# Patient Record
Sex: FEMALE | Race: BLACK OR AFRICAN AMERICAN | Employment: OTHER | URBAN - METROPOLITAN AREA
[De-identification: names, ages, dates, MRNs, and addresses within clinical notes are randomized per-mention and may not be internally consistent; named-entity substitution may affect disease eponyms.]

---

## 2017-01-01 ENCOUNTER — TELEPHONE (OUTPATIENT)
Dept: FAMILY MEDICINE CLINIC | Age: 82
End: 2017-01-01

## 2017-01-01 ENCOUNTER — OFFICE VISIT (OUTPATIENT)
Dept: FAMILY MEDICINE CLINIC | Age: 82
End: 2017-01-01

## 2017-01-01 VITALS
WEIGHT: 89.2 LBS | HEART RATE: 60 BPM | OXYGEN SATURATION: 97 % | BODY MASS INDEX: 18.72 KG/M2 | TEMPERATURE: 98.3 F | RESPIRATION RATE: 18 BRPM | SYSTOLIC BLOOD PRESSURE: 138 MMHG | DIASTOLIC BLOOD PRESSURE: 75 MMHG | HEIGHT: 58 IN

## 2017-01-01 DIAGNOSIS — J84.9 INTERSTITIAL LUNG DISEASE (HCC): ICD-10-CM

## 2017-01-01 DIAGNOSIS — R21 RASH: ICD-10-CM

## 2017-01-01 DIAGNOSIS — D63.8 ANEMIA, CHRONIC DISEASE: ICD-10-CM

## 2017-01-01 DIAGNOSIS — R05.9 COUGH: Primary | ICD-10-CM

## 2017-01-01 RX ORDER — METHYLPREDNISOLONE 4 MG/1
TABLET ORAL
Qty: 1 DOSE PACK | Refills: 0 | Status: SHIPPED | OUTPATIENT
Start: 2017-01-01 | End: 2018-01-01 | Stop reason: ALTCHOICE

## 2017-01-01 RX ORDER — ALBUTEROL SULFATE 90 UG/1
1 AEROSOL, METERED RESPIRATORY (INHALATION)
Qty: 1 INHALER | Refills: 3 | Status: SHIPPED | OUTPATIENT
Start: 2017-01-01 | End: 2018-01-01 | Stop reason: SDUPTHER

## 2017-01-01 RX ORDER — LANOLIN ALCOHOL/MO/W.PET/CERES
CREAM (GRAM) TOPICAL
Qty: 30 TAB | Refills: 0 | Status: SHIPPED | OUTPATIENT
Start: 2017-01-01

## 2017-01-27 ENCOUNTER — TELEPHONE (OUTPATIENT)
Dept: CARDIOLOGY CLINIC | Age: 82
End: 2017-01-27

## 2017-02-15 ENCOUNTER — OFFICE VISIT (OUTPATIENT)
Dept: FAMILY MEDICINE CLINIC | Age: 82
End: 2017-02-15

## 2017-02-15 VITALS
HEART RATE: 73 BPM | DIASTOLIC BLOOD PRESSURE: 95 MMHG | BODY MASS INDEX: 18.34 KG/M2 | HEIGHT: 60 IN | RESPIRATION RATE: 12 BRPM | SYSTOLIC BLOOD PRESSURE: 215 MMHG | TEMPERATURE: 98.7 F | OXYGEN SATURATION: 97 % | WEIGHT: 93.4 LBS

## 2017-02-15 DIAGNOSIS — Z00.00 MEDICARE ANNUAL WELLNESS VISIT, SUBSEQUENT: ICD-10-CM

## 2017-02-15 DIAGNOSIS — Z71.89 ACP (ADVANCE CARE PLANNING): ICD-10-CM

## 2017-02-15 DIAGNOSIS — I10 ESSENTIAL HYPERTENSION: ICD-10-CM

## 2017-02-15 DIAGNOSIS — J84.9 INTERSTITIAL LUNG DISEASE (HCC): ICD-10-CM

## 2017-02-15 DIAGNOSIS — D63.8 ANEMIA, CHRONIC DISEASE: ICD-10-CM

## 2017-02-15 DIAGNOSIS — G30.9 DEMENTIA IN ALZHEIMER'S DISEASE (HCC): Primary | ICD-10-CM

## 2017-02-15 DIAGNOSIS — Z23 ENCOUNTER FOR IMMUNIZATION: ICD-10-CM

## 2017-02-15 DIAGNOSIS — M05.79 RHEUMATOID ARTHRITIS INVOLVING MULTIPLE SITES WITH POSITIVE RHEUMATOID FACTOR (HCC): ICD-10-CM

## 2017-02-15 DIAGNOSIS — F02.80 DEMENTIA IN ALZHEIMER'S DISEASE (HCC): Primary | ICD-10-CM

## 2017-02-15 RX ORDER — AMLODIPINE BESYLATE 10 MG/1
10 TABLET ORAL DAILY
Qty: 30 TAB | Refills: 5 | Status: SHIPPED | OUTPATIENT
Start: 2017-02-15 | End: 2017-02-26

## 2017-02-15 RX ORDER — ALBUTEROL SULFATE 90 UG/1
1 AEROSOL, METERED RESPIRATORY (INHALATION)
Qty: 1 INHALER | Refills: 3 | Status: SHIPPED | OUTPATIENT
Start: 2017-02-15 | End: 2017-01-01 | Stop reason: SDUPTHER

## 2017-02-15 RX ORDER — HYDRALAZINE HYDROCHLORIDE 25 MG/1
25 TABLET, FILM COATED ORAL 3 TIMES DAILY
COMMUNITY
End: 2017-02-22 | Stop reason: ALTCHOICE

## 2017-02-15 RX ORDER — FERROUS SULFATE 325(65) MG
325 TABLET, DELAYED RELEASE (ENTERIC COATED) ORAL
Qty: 30 TAB | Refills: 5 | Status: SHIPPED | OUTPATIENT
Start: 2017-02-15 | End: 2017-01-01 | Stop reason: SDUPTHER

## 2017-02-15 RX ORDER — DONEPEZIL HYDROCHLORIDE 10 MG/1
TABLET, FILM COATED ORAL
Qty: 30 TAB | Refills: 5 | Status: SHIPPED | OUTPATIENT
Start: 2017-02-15 | End: 2018-01-01 | Stop reason: SDDI

## 2017-02-15 RX ORDER — HYDRALAZINE HYDROCHLORIDE 25 MG/1
25 TABLET, FILM COATED ORAL 3 TIMES DAILY
Qty: 90 TAB | Refills: 5 | Status: CANCELLED | OUTPATIENT
Start: 2017-02-15

## 2017-02-15 NOTE — PATIENT INSTRUCTIONS
Advance Directives: Care Instructions  Your Care Instructions  An advance directive is a legal way to state your wishes at the end of your life. It tells your family and your doctor what to do if you can no longer say what you want. There are two main types of advance directives. You can change them any time that your wishes change. · A living will tells your family and your doctor your wishes about life support and other treatment. · A medical power of  lets you name a person to make treatment decisions for you when you can't speak for yourself. This person is called a health care agent. If you do not have an advance directive, decisions about your medical care may be made by a doctor or a  who doesn't know you. It may help to think of an advance directive as a gift to the people who care for you. If you have one, they won't have to make tough decisions by themselves. Follow-up care is a key part of your treatment and safety. Be sure to make and go to all appointments, and call your doctor if you are having problems. It's also a good idea to know your test results and keep a list of the medicines you take. How can you care for yourself at home? · Discuss your wishes with your loved ones and your doctor. This way, there are no surprises. · Many states have a unique form. Or you might use a universal form that has been approved by many states. This kind of form can sometimes be completed and stored online. Your electronic copy will then be available wherever you have a connection to the Internet. In most cases, doctors will respect your wishes even if you have a form from a different state. · You don't need a  to do an advance directive. But you may want to get legal advice. · Think about these questions when you prepare an advance directive:  ¨ Who do you want to make decisions about your medical care if you are not able to?  Many people choose a family member, close friend, or doctor. ¨ Do you know enough about life support methods that might be used? If not, talk to your doctor so you understand. ¨ What are you most afraid of that might happen? You might be afraid of having pain, losing your independence, or being kept alive by machines. ¨ Where would you prefer to die? Choices include your home, a hospital, or a nursing home. ¨ Would you like to have information about hospice care to support you and your family? ¨ Do you want to donate organs when you die? ¨ Do you want certain Mormonism practices performed before you die? If so, put your wishes in the advance directive. · Read your advance directive every year, and make changes as needed. When should you call for help? Be sure to contact your doctor if you have any questions. Where can you learn more? Go to http://ivon-deepali.info/. Enter R264 in the search box to learn more about \"Advance Directives: Care Instructions. \"  Current as of: February 24, 2016  Content Version: 11.1  © 5718-6961 [employer]. Care instructions adapted under license by [employer] (which disclaims liability or warranty for this information). If you have questions about a medical condition or this instruction, always ask your healthcare professional. [address] any warranty or liability for your use of this information.

## 2017-02-15 NOTE — PROGRESS NOTES
1. Have you been to the ER, urgent care clinic since your last visit? Hospitalized since your last visit? No    2. Have you seen or consulted any other health care providers outside of the [address] [place] [address] since your last visit? Include any pap smears or colon screening.  No     Chief Complaint   Patient presents with    Medication Refill

## 2017-02-15 NOTE — MR AVS SNAPSHOT
Visit Information Date & Time Provider Department Dept. Phone Encounter #  
 2/15/2017 10:00 AM Carissa Ash NP [facility] [phone] [id number] Follow-up Instructions Return in about 4 weeks (around 3/15/2017) for anemia and blood pressure. Upcoming Health Maintenance Date Due DTaP/Tdap/Td series (1 - Tdap) 4/5/1953 Pneumococcal 65+ Low/Medium Risk (2 of 2 - PCV13) 2/22/2014 EYE EXAM RETINAL OR DILATED Q1 9/11/2015 MICROALBUMIN Q1 4/20/2016 INFLUENZA AGE 9 TO ADULT 8/1/2016 FOOT EXAM Q1 8/28/2016 GLAUCOMA SCREENING Q2Y 9/11/2016 MEDICARE YEARLY EXAM 11/4/2016 HEMOGLOBIN A1C Q6M 6/14/2017 LIPID PANEL Q1 12/15/2017 Allergies as of 2/15/2017  Review Complete On: 2/15/2017 By: Carissa Ash NP Severity Noted Reaction Type Reactions Codeine  05/27/2010    Nausea and Vomiting Current Immunizations  Reviewed on 9/4/2015 Name Date Influenza High Dose Vaccine PF  Incomplete, 10/23/2014 Influenza Vaccine 11/12/2013 Influenza Vaccine Split 11/16/2012  1:22 PM, 11/15/2012  6:17 PM  
 Influenza Vaccine Whole 10/27/2011 Pneumococcal Polysaccharide (PPSV-23) 2/22/2013 Not reviewed this visit You Were Diagnosed With   
  
 Codes Comments Dementia in Alzheimer's disease    -  Primary ICD-10-CM: G30.9, F02.80 ICD-9-CM: 331.0, 294.10 Essential hypertension     ICD-10-CM: I10 
ICD-9-CM: 401.9 Anemia, chronic disease     ICD-10-CM: D63.8 ICD-9-CM: 285.29 Interstitial lung disease ([facility] [address]     ICD-10-CM: J84.9 ICD-9-CM: 590 Rheumatoid arthritis involving multiple sites with positive rheumatoid factor (HCC)     ICD-10-CM: M05.79 ICD-9-CM: 714.0 Encounter for immunization     ICD-10-CM: D67 ICD-9-CM: V03.89 Medicare annual wellness visit, subsequent     ICD-10-CM: Z00.00 ICD-9-CM: V70.0 ACP (advance care planning)     ICD-10-CM: Z71.89 ICD-9-CM: V65.49 Vitals BP Pulse Temp Resp Height(growth percentile) Weight(growth percentile) (!) 215/95 (BP 1 Location: Right leg, BP Patient Position: Sitting) 73 98.7 °F (37.1 °C) (Oral) 12 5' (1.524 m) 93 lb 6.4 oz (42.4 kg) SpO2 BMI OB Status Smoking Status 97% 18.24 kg/m2 Postmenopausal Never Smoker Vitals History BMI and BSA Data Body Mass Index Body Surface Area  
 18.24 kg/m 2 1.34 m 2 Preferred Pharmacy Pharmacy Name Phone [address] AT [address] [place] [address]. [phone] Your Updated Medication List  
  
   
This list is accurate as of: 2/15/17 10:29 AM.  Always use your most recent med list.  
  
  
  
  
 acetaminophen 500 mg tablet Commonly known as:  TYLENOL Take 1,000 mg by mouth daily as needed. albuterol 90 mcg/actuation inhaler Commonly known as:  PROVENTIL HFA, VENTOLIN HFA, PROAIR HFA Take 1 Puff by inhalation every four (4) hours as needed for Wheezing. amLODIPine 10 mg tablet Commonly known as:  Stephani [place] Take 1 Tab by mouth daily. For blood pressure  
  
 aspirin delayed-release 81 mg tablet Take 1 tablet by mouth daily. donepezil 10 mg tablet Commonly known as:  ARICEPT  
TAKE 1 TABLET BY MOUTH NIGHTLY. ferrous sulfate 325 mg (65 mg iron) EC tablet Commonly known as:  IRON Take 1 Tab by mouth Daily (before breakfast). hydrALAZINE 25 mg tablet Commonly known as:  APRESOLINE Take 25 mg by mouth three (3) times daily. multivitamin tablet Commonly known as:  ONE A DAY Take 1 Tab by mouth daily. pneumococcal 13 nelly conj dip 0.5 mL Syrg injection Commonly known as:  PREVNAR-13  
0.5 mL by IntraMUSCular route once for 1 dose. VITAMIN D3 1,000 unit tablet Generic drug:  cholecalciferol Take 2,000 Units by mouth daily. Prescriptions Printed Refills pneumococcal 13 nelly conj dip (PREVNAR-13) 0.5 mL syrg injection 0 Si.5 mL by IntraMUSCular route once for 1 dose. Class: Print Route: IntraMUSCular Prescriptions Sent to Pharmacy Refills  
 albuterol (PROVENTIL HFA, VENTOLIN HFA, PROAIR HFA) 90 mcg/actuation inhaler 3 Sig: Take 1 Puff by inhalation every four (4) hours as needed for Wheezing. Class: Normal  
 Pharmacy: [facility] [address] AT [address] [place] [address]. Ph #: [phone] Route: Inhalation  
 donepezil (ARICEPT) 10 mg tablet 5 Sig: TAKE 1 TABLET BY MOUTH NIGHTLY. Class: Normal  
 Pharmacy: [facility] [address] AT [address] [place] [address]. Ph #: [phone]  
 amLODIPine (NORVASC) 10 mg tablet 5 Sig: Take 1 Tab by mouth daily. For blood pressure Class: Normal  
 Pharmacy: [facility] [address] AT [address] [place] [address]. Ph #: [phone] Route: Oral  
 ferrous sulfate (IRON) 325 mg (65 mg iron) EC tablet 5 Sig: Take 1 Tab by mouth Daily (before breakfast). Class: Normal  
 Pharmacy: [facility] [address] AT [address] [place] [address]. Ph #: [phone] Route: Oral  
  
We Performed the Following INFLUENZA VIRUS VACCINE, HIGH DOSE SEASONAL, PRESERVATIVE FREE [61447 CPT(R)] OCCULT BLOOD, IMMUNOASSAY (FIT) [id number] CPT(R)] Follow-up Instructions Return in about 4 weeks (around 3/15/2017) for anemia and blood pressure. Patient Instructions Advance Directives: Care Instructions Your Care Instructions An advance directive is a legal way to state your wishes at the end of your life. It tells your family and your doctor what to do if you can no longer say what you want. There are two main types of advance directives. You can change them any time that your wishes change. · A living will tells your family and your doctor your wishes about life support and other treatment. · A medical power of  lets you name a person to make treatment decisions for you when you can't speak for yourself. This person is called a health care agent. If you do not have an advance directive, decisions about your medical care may be made by a doctor or a  who doesn't know you. It may help to think of an advance directive as a gift to the people who care for you. If you have one, they won't have to make tough decisions by themselves. Follow-up care is a key part of your treatment and safety. Be sure to make and go to all appointments, and call your doctor if you are having problems. It's also a good idea to know your test results and keep a list of the medicines you take. How can you care for yourself at home? · Discuss your wishes with your loved ones and your doctor. This way, there are no surprises. · Many states have a unique form. Or you might use a universal form that has been approved by many states. This kind of form can sometimes be completed and stored online. Your electronic copy will then be available wherever you have a connection to the Internet. In most cases, doctors will respect your wishes even if you have a form from a different state. · You don't need a  to do an advance directive. But you may want to get legal advice. · Think about these questions when you prepare an advance directive: ¨ Who do you want to make decisions about your medical care if you are not able to? Many people choose a family member, close friend, or doctor. ¨ Do you know enough about life support methods that might be used? If not, talk to your doctor so you understand. ¨ What are you most afraid of that might happen? You might be afraid of having pain, losing your independence, or being kept alive by machines. ¨ Where would you prefer to die?  Choices include your home, a hospital, or a nursing home. ¨ Would you like to have information about hospice care to support you and your family? ¨ Do you want to donate organs when you die? ¨ Do you want certain Anabaptism practices performed before you die? If so, put your wishes in the advance directive. · Read your advance directive every year, and make changes as needed. When should you call for help? Be sure to contact your doctor if you have any questions. Where can you learn more? Go to http://ivon-deepali.info/. Enter R264 in the search box to learn more about \"Advance Directives: Care Instructions. \" Current as of: February 24, 2016 Content Version: 11.1 © 6888-4669 [employer]. Care instructions adapted under license by [employer] (which disclaims liability or warranty for this information). If you have questions about a medical condition or this instruction, always ask your healthcare professional. [place] [address] any warranty or liability for your use of this information. Introducing [facility]! [facility] introduces [employer]Hound patient portal. Now you can access parts of your medical record, email your doctor's office, and request medication refills online. 1. In your internet browser, go to [email or web address] 2. Click on the First Time User? Click Here link in the Sign In box. You will see the New Member Sign Up page. 3. Enter your PointsHound Access Code exactly as it appears below. You will not need to use this code after youve completed the sign-up process. If you do not sign up before the expiration date, you must request a new code. · PointsHound Access Code: [email or web address] Expires: 3/14/2017 12:09 PM 
 
4. Enter the last four digits of your Social Security Number (xxxx) and Date of Birth (mm/dd/yyyy) as indicated and click Submit. You will be taken to the next sign-up page. 5. Create a [employer] ID. This will be your [employer] login ID and cannot be changed, so think of one that is secure and easy to remember. 6. Create a [employer] password. You can change your password at any time. 7. Enter your Password Reset Question and Answer. This can be used at a later time if you forget your password. 8. Enter your e-mail address. You will receive e-mail notification when new information is available in [address] 9. Click Sign Up. You can now view and download portions of your medical record. 10. Click the Download Summary menu link to download a portable copy of your medical information. If you have questions, please visit the Frequently Asked Questions section of the [employer] website. Remember, [employer] is NOT to be used for urgent needs. For medical emergencies, dial 911. Now available from your iPhone and Android! Please provide this summary of care documentation to your next provider. Your primary care clinician is listed as Vahid Oswald. If you have any questions after today's visit, please call [phone].

## 2017-02-15 NOTE — PROGRESS NOTES
HISTORY OF PRESENT ILLNESS  Colin Whalen is a 80 y.o. female. HPI   Cardiovascular Review:  The patient has diabetes, hypertension, hyperlipidemia and coronary artery disease. Diet and Lifestyle: generally follows a low fat low cholesterol diet, generally follows a low sodium diet, sedentary  Home BP Monitoring: is not well controlled at home, ranging 170/90's. Patient's son stopped her blood pressure medications as he though sh no longer needed them. Was seen in ED admitted in 12/2016 for N/V. Was placed on Hydralazine TID. Pertinent ROS: taking medications as instructed, no medication side effects noted, no TIA's, no chest pain on exertion, no dyspnea on exertion, no swelling of ankles. Dementia  Patient is seen for follow up of dementia. She is accompanied by her son. Primary caregiver is son. Patient recently moved in with her son and is in an adult day support program. Overall progress since last visit: Cognitive symptoms: not changed. Behavioral symptoms: not changed    The family and the patient identify problems with changes in short and long term memory. Family and patient are concerned about  inability to maintain adequate nutrition. Medication administration: family sets up medications    Functional Assessment:   Activities of Daily Living (ADLs):    She is independent in the following: feeding and toileting  Requires assistance with the following: bathing and hygiene, grooming and dressing    Anemia  Patient presents for evaluation of anemia. It has been present for several years with worsening progression. Associated signs & symptoms: fatigue. Recent iron level was low. B12 and folate levels are normal. No recent colonoscopy. Osteoarthritis and Chronic Pain:  Patient has rheumatoid arthritis, primarily affecting the knees and hands. Symptoms onset: problem is longstanding. Rheumatological ROS: using Tylenol PRN. Previously seen by rheumatology but no follow up scheduled. Current Outpatient Prescriptions   Medication Sig Dispense Refill    hydrALAZINE (APRESOLINE) 25 mg tablet Take 25 mg by mouth three (3) times daily.  albuterol (PROVENTIL HFA, VENTOLIN HFA, PROAIR HFA) 90 mcg/actuation inhaler Take 1 Puff by inhalation every four (4) hours as needed for Wheezing. 1 Inhaler 3    donepezil (ARICEPT) 10 mg tablet TAKE 1 TABLET BY MOUTH NIGHTLY. 30 Tab 5    pneumococcal 13 nelly conj dip (PREVNAR-13) 0.5 mL syrg injection 0.5 mL by IntraMUSCular route once for 1 dose. 0.5 mL 0    amLODIPine (NORVASC) 10 mg tablet Take 1 Tab by mouth daily. For blood pressure 30 Tab 5    ferrous sulfate (IRON) 325 mg (65 mg iron) EC tablet Take 1 Tab by mouth Daily (before breakfast). 30 Tab 5    multivitamin (ONE A DAY) tablet Take 1 Tab by mouth daily.  aspirin delayed-release 81 mg tablet Take 1 tablet by mouth daily. 30 tablet 11    cholecalciferol (VITAMIN D3) 1,000 unit tablet Take 2,000 Units by mouth daily.  acetaminophen (TYLENOL) 500 mg tablet Take 1,000 mg by mouth daily as needed.        Past Medical History   Diagnosis Date    CAD (coronary artery disease)      CAD    Dementia in Alzheimer's disease     Depression     DM type 2 causing vascular disease (HCC)     GERD (gastroesophageal reflux disease)     Hx of tuberculosis      as child    Hypercholesterolemia     Hypertension     Osteoporosis, post-menopausal     Rheumatoid arthritis(714.0)      Past Surgical History   Procedure Laterality Date    Hx mohs procedure       right    Hx cataract removal       left with lens implant    Hx coronary stent placement      Hx coronary artery bypass graft      Hx knee replacement Left        Lab Results   Component Value Date/Time    WBC 4.2 12/16/2016 04:29 AM    WBC 6.9 05/09/2012 11:50 AM    Hemoglobin (POC) 12.2 05/27/2010 04:07 PM    HGB 8.0 12/16/2016 04:29 AM    Hematocrit (POC) 36 05/27/2010 04:07 PM    HCT 26.1 12/16/2016 04:29 AM    PLATELET 129 12/16/2016 04:29 AM    MCV 90.0 12/16/2016 04:29 AM       Review of Systems   Constitutional: Negative for malaise/fatigue and weight loss. Respiratory: Positive for cough (at night). Cardiovascular: Negative for palpitations and leg swelling. Gastrointestinal: Negative for heartburn. Musculoskeletal: Positive for joint pain. Negative for back pain and myalgias. Neurological: Negative for dizziness and weakness. Psychiatric/Behavioral: Positive for memory loss. Negative for depression. Physical Exam   Constitutional: She appears well-developed and well-nourished. Neck: Normal range of motion. Neck supple. No JVD present. Carotid bruit is not present. No thyromegaly present. Cardiovascular: Normal rate, regular rhythm and intact distal pulses. Exam reveals no gallop and no friction rub. No murmur heard. Pulmonary/Chest: Effort normal and breath sounds normal. No respiratory distress. Musculoskeletal: She exhibits no edema. Lymphadenopathy:     She has no cervical adenopathy. Neurological: She is alert. She is disoriented (to time ). Gait (in wheel chair) abnormal.   Psychiatric: She has a normal mood and affect. Her behavior is normal.   Nursing note and vitals reviewed. ASSESSMENT and PLAN  Corey Marking was seen today for medication refill. Diagnoses and all orders for this visit:    Dementia in Alzheimer's disease  Stable, no changes to current therapy  -     Refill donepezil (ARICEPT) 10 mg tablet; TAKE 1 TABLET BY MOUTH NIGHTLY. Essential hypertension  Not to goal. Stop Hydralazine. Resume CCB. Continue home monitoring and call for reading >150/90  -     amLODIPine (NORVASC) 10 mg tablet; Take 1 Tab by mouth daily. For blood pressure    Anemia, chronic disease  Progressive decline in H/H. Begin iron supplement daily. Check stool for blood. GI or hematology referrals as needed.   -     OCCULT BLOOD, IMMUNOASSAY (FIT)  -     ferrous sulfate (IRON) 325 mg (65 mg iron) EC tablet; Take 1 Tab by mouth Daily (before breakfast). Interstitial lung disease (HCC)  -     Refill albuterol (PROVENTIL HFA, VENTOLIN HFA, PROAIR HFA) 90 mcg/actuation inhaler; Take 1 Puff by inhalation every four (4) hours as needed for Wheezing. Rheumatoid arthritis involving multiple sites with positive rheumatoid factor ([facility] [address]  Follow up with rheumatology recommended    Encounter for immunization  -     pneumococcal 13 nelly conj dip (PREVNAR-13) 0.5 mL syrg injection; 0.5 mL by IntraMUSCular route once for 1 dose. -     Influenza virus vaccine (FLUZONE HIGH DOSE) 65 years and older    Medicare annual wellness visit, subsequent    ACP (advance care planning)  Discussed importance of living will; paperwork provided on [employer] Choices      I have discussed the diagnosis with the patient and the intended plan as seen in the above orders. The patient has received an after-visit summary along with patient information handout. I have discussed medication side effects and warnings with the patient as well. Follow-up Disposition:  Return in about 4 weeks (around 3/15/2017) for anemia and blood pressure.

## 2017-02-15 NOTE — PROGRESS NOTES
Lourdes Sutton is a 80 y.o. female and presents for annual Medicare Wellness Visit. Problem List: Reviewed with patient and discussed risk factors. 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, F02.80       Current medical providers:  Patient Care Team:  Eris Warner MD as PCP - General (Family Practice)  Piyush Hugo MD as Consulting Provider (Cardiology)  Janee Colindres RN as Nurse Navigator    PSH: Reviewed with patient  Past Surgical History   Procedure Laterality Date    Hx mohs procedure       right    Hx cataract removal       left with lens implant    Hx coronary stent placement      Hx coronary artery bypass graft      Hx knee replacement Left         SH: Reviewed with patient  Social History   Substance Use Topics    Smoking status: Never Smoker    Smokeless tobacco: Never Used    Alcohol use No       FH: Reviewed with patient  Family History   Problem Relation Age of Onset    Colon Cancer Mother     Cancer Father     Heart Disease Child              Medications/Allergies: Reviewed with patient  Current Outpatient Prescriptions on File Prior to Visit   Medication Sig Dispense Refill    multivitamin (ONE A DAY) tablet Take 1 Tab by mouth daily.  aspirin delayed-release 81 mg tablet Take 1 tablet by mouth daily. 30 tablet 11    cholecalciferol (VITAMIN D3) 1,000 unit tablet Take 2,000 Units by mouth daily.  acetaminophen (TYLENOL) 500 mg tablet Take 1,000 mg by mouth daily as needed.        No current facility-administered medications on file prior to visit. Allergies   Allergen Reactions    Codeine Nausea and Vomiting       Objective:  Visit Vitals    BP (!) 215/95 (BP 1 Location: Right leg, BP Patient Position: Sitting)    Pulse 73    Temp 98.7 °F (37.1 °C) (Oral)    Resp 12    Ht 5' (1.524 m)    Wt 93 lb 6.4 oz (42.4 kg)    SpO2 97%    BMI 18.24 kg/m2    Body mass index is 18.24 kg/(m^2). Assessment of cognitive impairment: Alert and oriented x 2    Depression Screen:   PHQ 2 / 9, over the last two weeks 3/23/2016   Little interest or pleasure in doing things Not at all   Feeling down, depressed or hopeless Not at all   Total Score PHQ 2 0       Fall Risk Assessment:    Fall Risk Assessment, last 12 mths 2/15/2017   Able to walk? Yes   Fall in past 12 months? No       Functional Ability:   Does the patient exhibit a steady gait?  no   How long did it take the patient to get up and walk from a sitting position? n/a   Is the patient self reliant?  (ie can do own laundry, meals, household chores)  no     Does the patient handle his/her own medications?  no     Does the patient handle his/her own money? no     Is the patients home safe (ie good lighting, handrails on stairs and bath, etc.)? no     Did you notice or did patient express any hearing difficulties? no     Did you notice or did patient express any vision difficulties?   no     Were distance and reading eye charts used? no       Advance Care Planning:   Patient was offered the opportunity to discuss advance care planning:  yes     Does patient have an Advance Directive:  no   If no, did you provide information on Caring Connections?   yes       Plan:      Orders Placed This Encounter    Influenza virus vaccine (FLUZONE HIGH DOSE) 65 years and older    OCCULT BLOOD, IMMUNOASSAY (FIT)    hydrALAZINE (APRESOLINE) 25 mg tablet    albuterol (PROVENTIL HFA, VENTOLIN HFA, PROAIR HFA) 90 mcg/actuation inhaler    donepezil (ARICEPT) 10 mg tablet    pneumococcal 13 nelly conj dip (PREVNAR-13) 0.5 mL syrg injection    amLODIPine (NORVASC) 10 mg tablet    ferrous sulfate (IRON) 325 mg (65 mg iron) EC tablet       Health Maintenance   Topic Date Due    DTaP/Tdap/Td series (1 - Tdap) 04/05/1953    Pneumococcal 65+ Low/Medium Risk (2 of 2 - PCV13) 02/22/2014    GLAUCOMA SCREENING Q2Y  09/11/2016    MEDICARE YEARLY EXAM  11/04/2016    OSTEOPOROSIS SCREENING (DEXA)  Completed    ZOSTER VACCINE AGE 60>  Addressed    INFLUENZA AGE 9 TO ADULT  Completed       *Patient verbalized understanding and agreement with the plan. A copy of the After Visit Summary with personalized health plan was given to the patient today.

## 2017-02-17 ENCOUNTER — TELEPHONE (OUTPATIENT)
Dept: FAMILY MEDICINE CLINIC | Age: 82
End: 2017-02-17

## 2017-02-17 ENCOUNTER — HOSPITAL ENCOUNTER (EMERGENCY)
Age: 82
Discharge: HOME | End: 2017-02-17
Attending: EMERGENCY MEDICINE
Payer: MEDICARE

## 2017-02-17 VITALS
BODY MASS INDEX: 19.52 KG/M2 | OXYGEN SATURATION: 95 % | DIASTOLIC BLOOD PRESSURE: 69 MMHG | HEART RATE: 72 BPM | RESPIRATION RATE: 19 BRPM | TEMPERATURE: 97.3 F | HEIGHT: 58 IN | WEIGHT: 93 LBS | SYSTOLIC BLOOD PRESSURE: 168 MMHG

## 2017-02-17 LAB
ALBUMIN SERPL BCP-MCNC: 2.1 G/DL (ref 3.5–5)
ALBUMIN/GLOB SERPL: 0.3 {RATIO} (ref 1.1–2.2)
ALP SERPL-CCNC: 96 U/L (ref 45–117)
ALT SERPL-CCNC: 17 U/L (ref 12–78)
ANION GAP BLD CALC-SCNC: 7 MMOL/L (ref 5–15)
AST SERPL W P-5'-P-CCNC: 22 U/L (ref 15–37)
BILIRUB SERPL-MCNC: 0.1 MG/DL (ref 0.2–1)
BUN SERPL-MCNC: 25 MG/DL (ref 6–20)
BUN/CREAT SERPL: 39 (ref 12–20)
CALCIUM SERPL-MCNC: 8.6 MG/DL (ref 8.5–10.1)
CHLORIDE SERPL-SCNC: 105 MMOL/L (ref 97–108)
CO2 SERPL-SCNC: 27 MMOL/L (ref 21–32)
CREAT SERPL-MCNC: 0.64 MG/DL (ref 0.55–1.02)
GLOBULIN SER CALC-MCNC: 7.5 G/DL (ref 2–4)
GLUCOSE SERPL-MCNC: 108 MG/DL (ref 65–100)
POTASSIUM SERPL-SCNC: 4.2 MMOL/L (ref 3.5–5.1)
PROT SERPL-MCNC: 9.6 G/DL (ref 6.4–8.2)
SODIUM SERPL-SCNC: 139 MMOL/L (ref 136–145)

## 2017-02-17 PROCEDURE — 36415 COLL VENOUS BLD VENIPUNCTURE: CPT | Performed by: EMERGENCY MEDICINE

## 2017-02-17 PROCEDURE — 80053 COMPREHEN METABOLIC PANEL: CPT | Performed by: EMERGENCY MEDICINE

## 2017-02-17 PROCEDURE — 99285 EMERGENCY DEPT VISIT HI MDM: CPT

## 2017-02-17 PROCEDURE — 93005 ELECTROCARDIOGRAM TRACING: CPT

## 2017-02-17 PROCEDURE — 74011250637 HC RX REV CODE- 250/637: Performed by: EMERGENCY MEDICINE

## 2017-02-17 RX ORDER — ACETAMINOPHEN 325 MG/1
650 TABLET ORAL
Status: COMPLETED | OUTPATIENT
Start: 2017-02-17 | End: 2017-02-17

## 2017-02-17 RX ADMIN — ACETAMINOPHEN 650 MG: 325 TABLET ORAL at 16:19

## 2017-02-17 NOTE — ED PROVIDER NOTES
HPI Comments: 80 y.o. female with past medical history significant for HTN, hypercholesterolemia, GERD, RA, CAD, dementia in alzheimer's disease, DM type II, and depression who presents from home with chief complaint of HA. Today at adult  pt was complaining of HA (rate: 5/10), and her BP was 236/90 and 230/90 with 2 separate readings throughout the day. Pt's son and daughter-in-law brought pt to ED for further evaluation; in ED pt reports decreased pain rate associated with MAIER. Son reports that 2 months ago pt was seen in ED for HTN and started hydralazine. 2 days PTA pt had checkup and PCP changed her BP medication in an effort to reduce the number of doses per day. Today was the first day pt took the new medication. Daughter-in-law reports pt has recently had a cough at night. Pt denies CP, SOB, fever, n/v, or any other pain. There are no other acute medical concerns at this time. PCP: Emma Majano MD    Note written by Dong. Marizol Cruz, as dictated by Chiara Damon MD 3:36 PM      The history is provided by the patient and a relative. No  was used.         Past Medical History:   Diagnosis Date    CAD (coronary artery disease)      CAD    Dementia in Alzheimer's disease     Depression     DM type 2 causing vascular disease ([facility] [address]     GERD (gastroesophageal reflux disease)     Hx of tuberculosis      as child    Hypercholesterolemia     Hypertension     Osteoporosis, post-menopausal     Rheumatoid arthritis(714.0)        Past Surgical History:   Procedure Laterality Date    Hx mohs procedure       right    Hx cataract removal       left with lens implant    Hx coronary stent placement      Hx coronary artery bypass graft      Hx knee replacement Left          Family History:   Problem Relation Age of Onset    Colon Cancer Mother     Cancer Father     Heart Disease Child              Social History     Social History    Marital status:  Spouse name: N/A    Number of children: N/A    Years of education: N/A     Occupational History    Not on file. Social History Main Topics    Smoking status: Never Smoker    Smokeless tobacco: Never Used    Alcohol use No    Drug use: No    Sexual activity: Yes     Other Topics Concern    Not on file     Social History Narrative         ALLERGIES: Codeine    Review of Systems   Constitutional: Negative for fever. HENT: Negative for facial swelling. Eyes: Negative for visual disturbance. Respiratory: Positive for cough (at night). Negative for chest tightness and shortness of breath. Cardiovascular: Negative for chest pain. Gastrointestinal: Negative for abdominal pain, diarrhea, nausea and vomiting. Genitourinary: Negative for dysuria. Musculoskeletal: Negative for arthralgias, back pain and neck pain. Skin: Negative for rash. Neurological: Positive for headaches. Negative for dizziness. Hematological: Negative for adenopathy. Psychiatric/Behavioral: Negative for suicidal ideas. All other systems reviewed and are negative. Vitals:    02/17/17 1406   BP: 192/83   Pulse: 72   Resp: 19   Temp: 97.3 °F (36.3 °C)   SpO2: 96%   Weight: 42.2 kg (93 lb)   Height: 4' 10\" (1.473 m)            Physical Exam   Constitutional: She is oriented to person, place, and time. She appears well-developed and well-nourished. No distress. HENT:   Head: Normocephalic and atraumatic. Mouth/Throat: Oropharynx is clear and moist.   Eyes: Pupils are equal, round, and reactive to light. No scleral icterus. Neck: Normal range of motion. Neck supple. No thyromegaly present. Cardiovascular: Normal rate, regular rhythm, normal heart sounds and intact distal pulses. No murmur heard. Pulmonary/Chest: Effort normal and breath sounds normal. No respiratory distress. Abdominal: Soft. Bowel sounds are normal. She exhibits no distension. There is no tenderness.    Musculoskeletal: Normal range of motion. She exhibits no edema. Neurological: She is alert and oriented to person, place, and time. Skin: Skin is warm and dry. No rash noted. She is not diaphoretic. Nursing note and vitals reviewed. Note written by Dong. Marizol Cruz, as dictated by Chiara Damon MD 3:38 PM        [facility]  ED Course       Procedures    Pt here for elevated BP. Mild headache but no other symptoms. Yesterday, pt stopped hydralazine she was taking tid and started amlodipine 10mg. Labs and ECG unremarkable. BP improving. Stable for discharge home.

## 2017-02-17 NOTE — ED TRIAGE NOTES
C/O headache all morning. Had blood pressure checked at [facility] and reading was 230 /90 and 200/90 with 2 different readings throughout day.

## 2017-02-17 NOTE — DISCHARGE INSTRUCTIONS
Acute High Blood Pressure: Care Instructions  Your Care Instructions  Acute high blood pressure is very high blood pressure. It's a serious problem. Very high blood pressure can damage your brain, heart, eyes, and kidneys. You may have been given medicines to lower your blood pressure. You may have gotten them as pills or through a needle in one of your veins. This is called an IV. And maybe you were given other medicines too. These can be needed when high blood pressure causes other problems. To keep your blood pressure at a lower level, you may need to make healthy lifestyle changes. And you will probably need to take medicines. Be sure to follow up with your doctor about your blood pressure and what you can do about it. Follow-up care is a key part of your treatment and safety. Be sure to make and go to all appointments, and call your doctor if you are having problems. It's also a good idea to know your test results and keep a list of the medicines you take. How can you care for yourself at home? · See your doctor as often as he or she recommends. This is to make sure your blood pressure is under control. You will probably go at least 2 times a year. But it may be more often at first.  · Take your blood pressure medicine exactly as prescribed. You may take one or more types. They include diuretics, beta-blockers, ACE inhibitors, calcium channel blockers, and angiotensin II receptor blockers. Call your doctor if you think you are having a problem with your medicine. · If you take blood pressure medicine, talk to your doctor before you take decongestants or anti-inflammatory medicine, such as ibuprofen. These can raise blood pressure. · Learn how to check your blood pressure at home. Check it often. · Ask your doctor if it's okay to drink alcohol. · Talk to your doctor about lifestyle changes that can help blood pressure. These include being active and not smoking.   When should you call for help?  Call 911 anytime you think you may need emergency care. This may mean having symptoms that suggest that your blood pressure is causing a serious heart or blood vessel problem. Your blood pressure may be over 180/110. For example, call 911 if:  · You have symptoms of a heart attack. These may include:  ¨ Chest pain or pressure, or a strange feeling in the chest.  ¨ Sweating. ¨ Shortness of breath. ¨ Nausea or vomiting. ¨ Pain, pressure, or a strange feeling in the back, neck, jaw, or upper belly or in one or both shoulders or arms. ¨ Lightheadedness or sudden weakness. ¨ A fast or irregular heartbeat. · You have symptoms of a stroke. These may include:  ¨ Sudden numbness, tingling, weakness, or loss of movement in your face, arm, or leg, especially on only one side of your body. ¨ Sudden vision changes. ¨ Sudden trouble speaking. ¨ Sudden confusion or trouble understanding simple statements. ¨ Sudden problems with walking or balance. ¨ A sudden, severe headache that is different from past headaches. · You have severe back or belly pain. Do not wait until your blood pressure comes down on its own. Get help right away. Call your doctor now or seek immediate care if:  · Your blood pressure is much higher than normal (such as 180/110 or higher), but you don't have symptoms. · You think high blood pressure is causing symptoms, such as:  ¨ Severe headache. ¨ Blurry vision. Watch closely for changes in your health, and be sure to contact your doctor if:  · Your blood pressure measures 140/90 or higher at least 2 times. That means the top number is 140 or higher or the bottom number is 90 or higher, or both. · You think you may be having side effects from your blood pressure medicine. · Your blood pressure is usually normal, but it goes above normal at least 2 times. Where can you learn more? Go to http://ivon-deepali.info/.   Enter P309 in the search box to learn more about \"Acute High Blood Pressure: Care Instructions. \"  Current as of: August 8, 2016  Content Version: 11.1  © 4259-6277 [employer], [employer]. Care instructions adapted under license by [employer] (which disclaims liability or warranty for this information). If you have questions about a medical condition or this instruction, always ask your healthcare professional. [address] any warranty or liability for your use of this information. We hope that we have addressed all of your medical concerns. The examination and treatment you received in the Emergency Department were for an emergent problem and were not intended as complete care. It is important that you follow up with your healthcare provider(s) for ongoing care. If your symptoms worsen or do not improve as expected, and you are unable to reach your usual health care provider(s), you should return to the Emergency Department. Today's healthcare is undergoing tremendous change, and patient satisfaction surveys are one of the many tools to assess the quality of medical care. You may receive a survey from the [employer] regarding your experience in the Emergency Department. I hope that your experience has been completely positive, particularly the medical care that I provided. As such, please participate in the survey; anything less than excellent does not meet my expectations or intentions. [facility] and [facility] participate in nationally recognized quality of care measures. If your blood pressure is greater than 120/80, as reported below, we urge that you seek medical care to address the potential of high blood pressure, commonly known as hypertension. Hypertension can be hereditary or can be caused by certain medical conditions, pain, stress, or \"white coat syndrome. \"       Please make an appointment with your health care provider(s) for follow up of your Emergency Department visit. VITALS:   Patient Vitals for the past 8 hrs:   Temp Pulse Resp BP SpO2   02/17/17 1615 - - - 175/82 94 %   02/17/17 1600 - - - 179/67 93 %   02/17/17 1541 - - - 170/72 97 %   02/17/17 1406 97.3 °F (36.3 °C) 72 19 192/83 96 %          Thank you for allowing us to provide you with medical care today. We realize that you have many choices for your emergency care needs. Please choose us in the future for any continued health care needs. Regards,           Gurmeet Taveras MD    [facility].   Office: [phone]            Recent Results (from the past 24 hour(s))   EKG, 12 LEAD, INITIAL    Collection Time: 02/17/17  3:53 PM   Result Value Ref Range    Ventricular Rate 71 BPM    Atrial Rate 71 BPM    P-R Interval 228 ms    QRS Duration 90 ms    Q-T Interval 422 ms    QTC Calculation (Bezet) 458 ms    Calculated P Axis 59 degrees    Calculated R Axis -20 degrees    Calculated T Axis 47 degrees    Diagnosis       Sinus rhythm with 1st degree AV block with premature atrial complexes  Voltage criteria for left ventricular hypertrophy  Abnormal ECG  When compared with ECG of 14-DEC-2016 11:46,  premature atrial complexes are now present  Vent. rate has increased BY  25 BPM     METABOLIC PANEL, COMPREHENSIVE    Collection Time: 02/17/17  3:54 PM   Result Value Ref Range    Sodium 139 136 - 145 mmol/L    Potassium 4.2 3.5 - 5.1 mmol/L    Chloride 105 97 - 108 mmol/L    CO2 27 21 - 32 mmol/L    Anion gap 7 5 - 15 mmol/L    Glucose 108 (H) 65 - 100 mg/dL    BUN 25 (H) 6 - 20 MG/DL    Creatinine 0.64 0.55 - 1.02 MG/DL    BUN/Creatinine ratio 39 (H) 12 - 20      GFR est AA >60 >60 ml/min/1.73m2    GFR est non-AA >60 >60 ml/min/1.73m2    Calcium 8.6 8.5 - 10.1 MG/DL    Bilirubin, total 0.1 (L) 0.2 - 1.0 MG/DL    ALT (SGPT) 17 12 - 78 U/L    AST (SGOT) 22 15 - 37 U/L    Alk.  phosphatase 96 45 - 117 U/L    Protein, total 9.6 (H) 6.4 - 8.2 g/dL    Albumin 2.1 (L) 3.5 - 5.0 g/dL    Globulin 7.5 (H) 2.0 - 4.0 g/dL    A-G Ratio 0.3 (L) 1.1 - 2.2         No results found.

## 2017-02-18 LAB
ATRIAL RATE: 71 BPM
CALCULATED P AXIS, ECG09: 59 DEGREES
CALCULATED R AXIS, ECG10: -20 DEGREES
CALCULATED T AXIS, ECG11: 47 DEGREES
DIAGNOSIS, 93000: NORMAL
P-R INTERVAL, ECG05: 228 MS
Q-T INTERVAL, ECG07: 422 MS
QRS DURATION, ECG06: 90 MS
QTC CALCULATION (BEZET), ECG08: 458 MS
VENTRICULAR RATE, ECG03: 71 BPM

## 2017-02-19 ENCOUNTER — TELEPHONE (OUTPATIENT)
Dept: FAMILY MEDICINE CLINIC | Age: 82
End: 2017-02-19

## 2017-02-19 NOTE — TELEPHONE ENCOUNTER
Son called on behalf of his mother as she has dementia and he is primary caregiver. Pt has h/o dementia, uncontrolled HTN, recurrent N/V, intolerance to many oral meds and progressive wt loss (per chart son reported close to a 30-40 lbs wt loss at a previous visit)    He called to report pt vomited last night around 10pm.  They gave her a dose of Zofran but she was unable to keep it down. She vomited 2 more times then finally slept. They had given her all her meds around 2PM earlier that day: Norvasc, Ferrous Sulfate, Vit D, Tylenol, Aricept(?) and feel that it was the Norvasc bc that was the only thing new. .this was prescribed by Trina Guillen here on 2/15/17. She ate crackers this morning and has kept that down ok but still doesn't have much appetite. They have not given her any of her meds today. Her BP's this AM are 164/88 and 171/100. She has no headache, CP, abd pain, vomiting, fevers today. Just feeling tired. Family reports pt has longstanding h/o intolerance to taking medications in general. They say whenever she starts something new aravind BP meds, they generally bother her stomach and it has been this way for years. Son stopped all of her meds about 6 months ago due to nausea issues. She was taken to ED in  and her BP was high at that time. She was dc'd on Zofran, Hydralazine tid, and Pepcid. She was doing well on the regimen of Hydralazine but son eventually stopped giving her that bc it was hard for them to get that med in her tid due to their schedules. So they stopped it and when she was in to see Jason Royal on 2-15 her BP was very high again (215/95). Jason Royal replaced the Hydralazine w/ Norvasc for control and better compliance. That brings us to this point. Back at her ED visit in  there was mention of trying her on PPI but also seeing GI due to this h/o possible gerd and recurrent N/V. Family states she has not seen GI.     I have advised son of the following today:  1) Continue to hold the Norvasc  2) Restart Hydralazine tid (they will give her a dose in the AM, have the adult day program give her a dose mid day, and family give 3rd dose in the evening).   3) Continue interim BP monitoring back on Hydralazine  4) Use Zofran q8hr prn  5) Contact PCP Dr Neftali Joseph to discuss possibly arranging GI eval and a possible alternate BP medication if they are having problems w/ the scheduling of administering the Hydralazine

## 2017-02-20 ENCOUNTER — TELEPHONE (OUTPATIENT)
Dept: FAMILY MEDICINE CLINIC | Age: 82
End: 2017-02-20

## 2017-02-20 ENCOUNTER — PATIENT OUTREACH (OUTPATIENT)
Dept: FAMILY MEDICINE CLINIC | Age: 82
End: 2017-02-20

## 2017-02-20 RX ORDER — ONDANSETRON 4 MG/1
4 TABLET, ORALLY DISINTEGRATING ORAL
COMMUNITY
End: 2018-01-01

## 2017-02-20 NOTE — TELEPHONE ENCOUNTER
Returned call to [facility], she states that family brought in Hydralizine for patient to resume TID, yet brought in the d/c summary from ED that stated for her to continue on Amlodipine 10mg. Blanca stated DIL told [facility] to resume hydralazine. Adv her that there was no change, that the ED summary d/c papers were correct. Patient was calm and collected in the ED. There is a hx of patient not taking medications and non-compliance with the meds, that could be the reason for patient not taking Amlodipine/HTN issues. Christin Martinez would consult with Dr. Neva Mehta regarding.

## 2017-02-20 NOTE — PATIENT INSTRUCTIONS
Alzheimer's Disease: Care Instructions  Your Care Instructions  Alzheimer's disease is a type of dementia. It causes memory loss and affects judgment, language, and behavior. You may have trouble making decisions or may get lost in places that you used to know well. Alzheimer's disease is different than mild memory loss that occurs with aging. It is not clear what causes Alzheimer's disease, but it is the most common form of dementia in older adults. Finding out that you have this disease is a shock. You may be afraid and worried about how the condition will change your life. Although there is no cure at this time, medicine in some cases may slow memory loss for a while. Other medicines may be able to help you sleep or cope with depression and behavior changes. Alzheimer's disease is different for everyone. It may take many years to develop. In some cases, people can function well for a long time. In the early stage of the disease, you can do things at home to make life easier and safer. You also can keep doing your hobbies and other activities. Many people find comfort in planning now for their future needs. Follow-up care is a key part of your treatment and safety. Be sure to make and go to all appointments, and call your doctor if you are having problems. Its also a good idea to know your test results and keep a list of the medicines you take. How can you care for yourself at home? Taking care of yourself  · If your doctor gives you medicines, take them exactly as prescribed. Call your doctor if you think you are having a problem with your medicine. You will get more details on the medicines your doctor prescribes. · Eat a balanced diet. Get plenty of whole grains, fruits, and vegetables every day. If you are not hungry at mealtimes, eat snacks at midmorning and in the afternoon. Try drinks such as Boost, Ensure, or Sustacal if you are having trouble keeping your weight up. · Stay active.  Exercise such as walking may slow the decline of your mental abilities. Try to stay active mentally too. Read and work crossword puzzles if you enjoy these activities. · If you have trouble sleeping, do not nap during the day. Get regular exercise (but not within several hours of bedtime). Drink a glass of warm milk or caffeine-free herbal tea before going to bed. · Ask your doctor about support groups and other resources in your area. They can help people who have Alzheimer's disease and their families. · Be patient. You may find that a task takes you longer than it used to. · If you have not already done so, make a list of advance directives. Advance directives are instructions to your doctor and family members about what kind of care you want if you become unable to speak or express yourself. Talk to a  about making a will, if you do not already have one. Keeping schedules  · Develop a routine. You will feel less frustrated or confused if you have a clear, simple plan of what to do every day. ¨ Make lists of your medicines and when to take them. ¨ Write down appointments and other tasks in a calendar. ¨ Put sticky notes around the house to help you remember events and other things you have to do. ¨ Schedule activities and tasks for times of the day when you are best able to handle them. Staying safe  · Tell someone when you are going out and where you are going. Let the person know when you will be back. Before you go out alone, write down where you are going, how to get there, and how to get back home. Do this even if you have gone there many times before. Take someone along with you when possible. · Make your home safe. Tack down rugs, put no-slip tape in the tub, use handrails, and put safety switches on stoves and appliances. · Have a family member or other caregiver tell you whether you are driving badly. Deciding to stop driving is very hard for many people. Driving helps you feel independent.  Your state s license bureau can do a driving test if there is any question. Plan for other means of getting around when you are no longer able to drive. · Use strong lighting, especially at night. Put night-lights in bedrooms, hallways, and bathrooms. · Lower the hot water temperature setting to 120°F or lower to avoid burns. When should you call for help? Call 911 anytime you think you may need emergency care. For example, call if:  · You are lost and do not know whom to call. · You are injured and do not know whom to call. Call your doctor now or seek immediate medical care if:  · Your symptoms suddenly get much worse. Watch closely for changes in your health, and be sure to contact your doctor if:  · You want more information about how you can take care of yourself. Where can you learn more? Go to http://ivon-deepali.info/. Enter Y179 in the search box to learn more about \"Alzheimer's Disease: Care Instructions. \"  Current as of: July 26, 2016  Content Version: 11.1  © 1420-6779 [employer]. Care instructions adapted under license by [employer] (which disclaims liability or warranty for this information). If you have questions about a medical condition or this instruction, always ask your healthcare professional. [address] any warranty or liability for your use of this information.

## 2017-02-20 NOTE — TELEPHONE ENCOUNTER
Contact # is [phone]    Oly Hare, with [facility], is calling to discuss patient's medication changes with Dr Marbin Rodriguez; states they were changed over the weekend by a different provider

## 2017-02-20 NOTE — TELEPHONE ENCOUNTER
Per notes from Dr. Mahesh Sanches over the weekend, patient was having GI Sx after the ED visit, and was advised by Provider on call to hold amlodipine, continue with Apresoline. See phone convo 2/19/17. Adv to follow up with Dr. Neva Mehta on Wednesday appt, and that no refills of Apresoline at this time until Dr. Neva Mehta is able to examine the patient and possible ED referral.     Order needs to be sent to "[facility] Day.  Fax [phone]

## 2017-02-20 NOTE — PROGRESS NOTES
NNTOCED    [facility], patient went to [facility] ED 2/17 for elevated bp. Son drove her-( at Day care, bp was 236/90.) Labs and EKG done there normal.   NN called son today, caregiver, He reports she has had HTN, nausea and vomiting, weight loss. Called  on 2/19 re bp remaining elevated- see her note. She advised to hold Norvasc and go back to the Hydralazine tid. F/u with . Son says bp this am was 168/82. Woke up anxious but did calm down and went to Adult Day Care. Still has some nausea. He plans to call GI for an appointment. Says appetite remains small- ate little bit of breakfast and later drank an Ensure. When doing the Depression Screen- says mother just wants to stay in bed all day but does get up for him. Scheduled f/u with  for 2/22 at 3:45pm. Reminded son to call GI and schedule appt as well. Gave son NN contact information. He thanked me for the call.

## 2017-02-22 ENCOUNTER — OFFICE VISIT (OUTPATIENT)
Dept: FAMILY MEDICINE CLINIC | Age: 82
End: 2017-02-22

## 2017-02-22 VITALS
RESPIRATION RATE: 16 BRPM | WEIGHT: 95 LBS | OXYGEN SATURATION: 99 % | TEMPERATURE: 97.7 F | HEART RATE: 79 BPM | SYSTOLIC BLOOD PRESSURE: 168 MMHG | DIASTOLIC BLOOD PRESSURE: 66 MMHG | HEIGHT: 58 IN | BODY MASS INDEX: 19.94 KG/M2

## 2017-02-22 DIAGNOSIS — Z23 ENCOUNTER FOR IMMUNIZATION: ICD-10-CM

## 2017-02-22 DIAGNOSIS — G30.9 DEMENTIA IN ALZHEIMER'S DISEASE (HCC): ICD-10-CM

## 2017-02-22 DIAGNOSIS — E78.2 MIXED HYPERLIPIDEMIA: Primary | ICD-10-CM

## 2017-02-22 DIAGNOSIS — F02.80 DEMENTIA IN ALZHEIMER'S DISEASE (HCC): ICD-10-CM

## 2017-02-22 DIAGNOSIS — E61.1 IRON DEFICIENCY: ICD-10-CM

## 2017-02-22 DIAGNOSIS — I10 ESSENTIAL HYPERTENSION: ICD-10-CM

## 2017-02-22 RX ORDER — HYDRALAZINE HYDROCHLORIDE 25 MG/1
25 TABLET, FILM COATED ORAL 3 TIMES DAILY
Qty: 90 TAB | Refills: 5 | Status: CANCELLED | OUTPATIENT
Start: 2017-02-22

## 2017-02-22 RX ORDER — AMLODIPINE BESYLATE 10 MG/1
10 TABLET ORAL DAILY
Qty: 30 TAB | Refills: 5 | Status: SHIPPED | OUTPATIENT
Start: 2017-02-22 | End: 2018-01-01 | Stop reason: SDUPTHER

## 2017-02-22 NOTE — PROGRESS NOTES
HISTORY OF PRESENT ILLNESS  Jim Mcduffie is a 80 y.o. female. Blood pressure 168/66, pulse 79, temperature 97.7 °F (36.5 °C), temperature source Oral, resp. rate 16, height 4' 10\" (1.473 m), weight 95 lb (43.1 kg), SpO2 99 %. Body mass index is 19.86 kg/(m^2). Chief Complaint   Patient presents with   [facility] Follow Up     [facility] 2/20/2017 dx of HTN      HPI   Jim Mcduffie 80 y.o. female  presents to the office today for a hospital follow up. She presents with son at bedside to assist in presenting history. Hospital follow up/hypertension: Bp at office today 168/66. Pt was seen at [facility] ER on 02/20/17 for elevated blood pressure and mild headache. Her labs and ECG were unremarkable and bp improved at the hospital. Pt was also seen by Consuello Denver NP on 02/15/17 and advised to discontinue her Hydralazine 25 mg TID and to start Norvasc 10 mg daily instead to help reduce the number of doses she needed to take. After ER visit, pt returned to taking Hydralazine 25 mg TID instead of Norvasc. Her son regularly checks her bp outside the office and notes bp readings have been erratic ranging from 115-192/ and averaging in the 150s/80s. Bp is not at goal. Advised pt to slowly wean off the Hydralazine and to start Norvasc 10 mg daily. We will reassess bp in two weeks. Hyperlipidemia: Lipid panel on 12/15/16 notable for total cholesterol 112, LDL 56, HDL 42, and triglycerides 71. Pt is not on any statins. Cholesterol is stable, continue current regimen. Health maintenance: Pt is due for Prevnar-13 vaccine. Prescription sent to pharmacy today. Current Outpatient Prescriptions   Medication Sig Dispense Refill    amLODIPine (NORVASC) 10 mg tablet Take 1 Tab by mouth daily. 30 Tab 5    ondansetron (ZOFRAN ODT) 4 mg disintegrating tablet Take 4 mg by mouth every eight (8) hours as needed for Nausea.       albuterol (PROVENTIL HFA, VENTOLIN HFA, PROAIR HFA) 90 mcg/actuation inhaler Take 1 Puff by inhalation every four (4) hours as needed for Wheezing. 1 Inhaler 3    donepezil (ARICEPT) 10 mg tablet TAKE 1 TABLET BY MOUTH NIGHTLY. 30 Tab 5    ferrous sulfate (IRON) 325 mg (65 mg iron) EC tablet Take 1 Tab by mouth Daily (before breakfast). 30 Tab 5    multivitamin (ONE A DAY) tablet Take 1 Tab by mouth daily.  aspirin delayed-release 81 mg tablet Take 1 tablet by mouth daily. 30 tablet 11    cholecalciferol (VITAMIN D3) 1,000 unit tablet Take 2,000 Units by mouth daily.  acetaminophen (TYLENOL) 500 mg tablet Take 1,000 mg by mouth daily as needed. Allergies   Allergen Reactions    Codeine Nausea and Vomiting     Past Medical History:   Diagnosis Date    CAD (coronary artery disease)     CAD    Dementia in Alzheimer's disease     Depression     DM type 2 causing vascular disease ([facility] [address]     GERD (gastroesophageal reflux disease)     Hx of tuberculosis     as child    Hypercholesterolemia     Hypertension     Osteoporosis, post-menopausal     Rheumatoid arthritis(714.0)      Past Surgical History:   Procedure Laterality Date    HX CATARACT REMOVAL      left with lens implant    HX CORONARY ARTERY BYPASS GRAFT      HX CORONARY STENT PLACEMENT      HX KNEE REPLACEMENT Left     HX MOHS PROCEDURES      right     Family History   Problem Relation Age of Onset    Colon Cancer Mother     Cancer Father     Heart Disease Child            Social History   Substance Use Topics    Smoking status: Never Smoker    Smokeless tobacco: Never Used    Alcohol use No        Review of Systems   Constitutional: Negative. Negative for malaise/fatigue. Eyes: Negative for blurred vision. Respiratory: Negative for shortness of breath. Cardiovascular: Negative for chest pain and leg swelling. Musculoskeletal: Negative. Neurological: Negative. Negative for dizziness and headaches. All other systems reviewed and are negative.       Physical Exam   Constitutional: She appears well-developed and well-nourished. No distress. HENT:   Head: Normocephalic and atraumatic. Neck: Carotid bruit is not present. Cardiovascular: Normal rate, regular rhythm, normal heart sounds and intact distal pulses. Exam reveals no gallop and no friction rub. No murmur heard. Pulmonary/Chest: Effort normal and breath sounds normal. No respiratory distress. She has no wheezes. She has no rales. Musculoskeletal: She exhibits no edema. Neurological: She is alert. Skin: She is not diaphoretic. Psychiatric: She has a normal mood and affect. Her behavior is normal.   Nursing note and vitals reviewed. ASSESSMENT and PLAN  Portia Menon was seen today for hospital follow up. Diagnoses and all orders for this visit:    Mixed hyperlipidemia  -     METABOLIC PANEL, COMPREHENSIVE  -     LIPID PANEL  - Presumed stable, will assess levels today    Essential hypertension  -     amLODIPine (NORVASC) 10 mg tablet; Take 1 Tab by mouth daily.  -     CBC WITH AUTOMATED DIFF  - Bp is not at goal. Advised pt to slowly wean off her Hydralazine 25 mg TID and to start Norvasc 10 mg daily. We will reassess her bp in two weeks. Iron deficiency  -     IRON PROFILE  - Presumed stable, will assess levels today to rule out further deficiency    Encounter for immunization  -     pneumococcal 13 nelly conj dip (PREVNAR-13) 0.5 mL syrg injection; 0.5 mL by IntraMUSCular route once for 1 dose. Dementia in Alzheimer's disease  Stable, continue current regimen. Pt's son is primary caregiver and pt goes to [facility]. Other orders  -     Cancel: hydrALAZINE (APRESOLINE) 25 mg tablet; Take 1 Tab by mouth three (3) times daily. Follow-up Disposition:  Return in about 2 weeks (around 3/8/2017) for hypertension follow up, evening appointment. Medication risks/benefits/costs/interactions/alternatives discussed with patient.   Advised patient to call back or return to office if symptoms worsen/change/persist.  If patient cannot reach us or should anything more severe/urgent arise he/she should proceed directly to the nearest emergency department. Discussed expected course/resolution/complications of diagnosis in detail with patient. Patient given a written after visit summary which includes her diagnoses, current medications and vitals. Patient expressed understanding with the diagnosis and plan. Written by sharla Squires, as dictated by Joann Russell M.D.    I have reviewed and agree with the above note and have made corrections where appropriate, Dr. Mathieu Benavidez MD

## 2017-02-22 NOTE — PROGRESS NOTES
Chief Complaint   Patient presents with   [address] 2017 dx of HTN     Identified pt with two pt identifiers (Name @ )    Per Son BP 2017 at 6:40a was 130/7, 2017 at 6A was 188/77 and after 40 minutes her /64    Patient due for eye examination    1. Have you been to the ER, urgent care clinic since your last visit? Hospitalized since your last visit? Yes 2017 [facility] HTN    2. Have you seen or consulted any other health care providers outside of the [address] [place] [address] since your last visit? Include any pap smears or colon screening.  No     \"REVIEWED RECORD IN PREPARATION FOR VISIT AND HAVE OBTAINED NECESSARY DOCUMENTATION\"

## 2017-02-22 NOTE — MR AVS SNAPSHOT
Visit Information Date & Time Provider Department Dept. Phone Encounter #  
 2/22/2017  3:45 PM Cesar Epps  [facility] [phone] [id number] Follow-up Instructions Return in about 2 weeks (around 3/8/2017) for hypertension follow up, evening appointment. Your Appointments 3/8/2017  6:00 PM  
ROUTINE CARE with Cesar Epps MD  
[facility]) Appt Note: 2 weeks follow up HTN  
 [address] [facility] [address]  
[phone]  
  
   
 [address] [phone]  
  
    
 3/21/2017  1:00 PM  
ACUTE CARE with Hiral Marshall MD  
Arthritis and [address] [place] [address] Street [address]) Appt Note: okay to see Lili/Last seen 8/2015  
 [address] [facility] [address]  
[phone]  
  
   
 [address] [address]  
  
    
 3/24/2017  4:00 PM  
ROUTINE CARE with Vicki Loaiza  [facility] ([address]) Appt Note: one month follow-up  
 [address] [address]  
[phone]  
  
   
 [address] [address] Upcoming Health Maintenance Date Due FOBT Q 1 YEAR, 18+ 4/5/1950 DTaP/Tdap/Td series (1 - Tdap) 4/5/1953 Pneumococcal 65+ Low/Medium Risk (2 of 2 - PCV13) 2/22/2014 GLAUCOMA SCREENING Q2Y 9/11/2016 MEDICARE YEARLY EXAM 2/16/2018 Allergies as of 2/22/2017  Review Complete On: 2/22/2017 By: Cesar Epps MD  
  
 Severity Noted Reaction Type Reactions Codeine  05/27/2010    Nausea and Vomiting Current Immunizations  Reviewed on 2/22/2017 Name Date Influenza High Dose Vaccine PF 2/15/2017, 10/23/2014 Influenza Vaccine 11/12/2013 Influenza Vaccine Split 11/16/2012  1:22 PM, 11/15/2012  6:17 PM  
 Influenza Vaccine Whole 10/27/2011 Pneumococcal Polysaccharide (PPSV-23) 2/22/2013 Reviewed by Roopa Strong LPN on 8/21/9368 at  3:56 PM  
 Reviewed by Shon Rainey MD on 2/22/2017 at  4:30 PM  
You Were Diagnosed With   
  
 Codes Comments Mixed hyperlipidemia    -  Primary ICD-10-CM: L11.5 ICD-9-CM: 272.2 Essential hypertension     ICD-10-CM: I10 
ICD-9-CM: 401.9 Iron deficiency     ICD-10-CM: E61.1 ICD-9-CM: 280.9 Encounter for immunization     ICD-10-CM: X01 ICD-9-CM: V03.89 Dementia in Alzheimer's disease     ICD-10-CM: G30.9, F02.80 ICD-9-CM: 331.0, 294.10 Vitals BP  
  
  
  
  
  
 168/66 (BP 1 Location: Left arm, BP Patient Position: Sitting) Vitals History BMI and BSA Data Body Mass Index Body Surface Area  
 19.86 kg/m 2 1.33 m 2 Preferred Pharmacy Pharmacy Name Phone [address] [facility] AT [address] [place] [address]. [phone] Your Updated Medication List  
  
   
This list is accurate as of: 2/22/17  4:41 PM.  Always use your most recent med list.  
  
  
  
  
 acetaminophen 500 mg tablet Commonly known as:  TYLENOL Take 1,000 mg by mouth daily as needed. albuterol 90 mcg/actuation inhaler Commonly known as:  PROVENTIL HFA, VENTOLIN HFA, PROAIR HFA Take 1 Puff by inhalation every four (4) hours as needed for Wheezing. * amLODIPine 10 mg tablet Commonly known as:  Arlyss Chacha Take 1 Tab by mouth daily. For blood pressure * amLODIPine 10 mg tablet Commonly known as:  Arlyss [place] Take 1 Tab by mouth daily. aspirin delayed-release 81 mg tablet Take 1 tablet by mouth daily. donepezil 10 mg tablet Commonly known as:  ARICEPT  
TAKE 1 TABLET BY MOUTH NIGHTLY. ferrous sulfate 325 mg (65 mg iron) EC tablet Commonly known as:  IRON Take 1 Tab by mouth Daily (before breakfast). multivitamin tablet Commonly known as:  ONE A DAY Take 1 Tab by mouth daily.   
  
 OTHER  
 Pt to d/c lisinopril on file and take hydrALAZINE (APRESOLINE) 25 mg tablet, 1.5 tab, PO, TID. Mid day dose to be taken at day center  
  
 pneumococcal 13 nelly conj dip 0.5 mL Syrg injection Commonly known as:  PREVNAR-13  
0.5 mL by IntraMUSCular route once for 1 dose. VITAMIN D3 1,000 unit tablet Generic drug:  cholecalciferol Take 2,000 Units by mouth daily. ZOFRAN ODT 4 mg disintegrating tablet Generic drug:  ondansetron Take 4 mg by mouth every eight (8) hours as needed for Nausea. * Notice: This list has 2 medication(s) that are the same as other medications prescribed for you. Read the directions carefully, and ask your doctor or other care provider to review them with you. Prescriptions Sent to Pharmacy Refills  
 pneumococcal 13 nelly conj dip (PREVNAR-13) 0.5 mL syrg injection 0 Si.5 mL by IntraMUSCular route once for 1 dose. Class: Normal  
 Pharmacy: [facility] [address] [place] [address] AT [address] [place] [address]. Ph #: [phone] Route: IntraMUSCular  
 amLODIPine (NORVASC) 10 mg tablet 5 Sig: Take 1 Tab by mouth daily. Class: Normal  
 Pharmacy: [facility] [address] [place] [address] AT [address] [place] [address]. Ph #: [phone] Route: Oral  
  
We Performed the Following CBC WITH AUTOMATED DIFF [44484 CPT(R)] IRON PROFILE O9502709 CPT(R)] LIPID PANEL [83401 CPT(R)] METABOLIC PANEL, COMPREHENSIVE [18703 CPT(R)] Follow-up Instructions Return in about 2 weeks (around 3/8/2017) for hypertension follow up, evening appointment. Introducing [facility]! Julia Sumner introduces [employer] patient portal. Now you can access parts of your medical record, email your doctor's office, and request medication refills online. 1. In your internet browser, go to [email or web address] 2. Click on the First Time User? Click Here link in the Sign In box. You will see the New Member Sign Up page. 3. Enter your Miro Access Code exactly as it appears below. You will not need to use this code after youve completed the sign-up process. If you do not sign up before the expiration date, you must request a new code. · Miro Access Code: [email or web address] Expires: 3/14/2017 12:09 PM 
 
4. Enter the last four digits of your Social Security Number (xxxx) and Date of Birth (mm/dd/yyyy) as indicated and click Submit. You will be taken to the next sign-up page. 5. Create a Miro ID. This will be your Miro login ID and cannot be changed, so think of one that is secure and easy to remember. 6. Create a Miro password. You can change your password at any time. 7. Enter your Password Reset Question and Answer. This can be used at a later time if you forget your password. 8. Enter your e-mail address. You will receive e-mail notification when new information is available in [address] 9. Click Sign Up. You can now view and download portions of your medical record. 10. Click the Download Summary menu link to download a portable copy of your medical information. If you have questions, please visit the Frequently Asked Questions section of the Miro website. Remember, Miro is NOT to be used for urgent needs. For medical emergencies, dial 911. Now available from your iPhone and Android! Please provide this summary of care documentation to your next provider. Your primary care clinician is listed as Stepan Cochran. If you have any questions after today's visit, please call [phone].

## 2017-03-13 ENCOUNTER — TELEPHONE (OUTPATIENT)
Dept: FAMILY MEDICINE CLINIC | Age: 82
End: 2017-03-13

## 2017-03-13 ENCOUNTER — PATIENT OUTREACH (OUTPATIENT)
Dept: FAMILY MEDICINE CLINIC | Age: 82
End: 2017-03-13

## 2017-03-13 NOTE — PROGRESS NOTES
NN TARAH NOTE for:  Jake Shemar y.o., 04/05/1932  Post Ed [facility] follow-up call for patient seen in Ed on 2/17/17. NN unable to reach son,  Lj Tracey (Emergency Contact) [phone] (M)     at number listed. Left VM for him to call NN regarding a follow-up call for patient seen in the ED for HTN. Patient did follow up with PCP on 2/22/17. Has second follow-up visit scheduled for 3/24/17 with Jalyn Hanna NP. Patient also has a specialist appointment scheduled on:  3/21/2017 1:00 PM Chica Oleary MD Arthritis and [address]   Case management Plan:  NN will continue to attempt contacts with patient by telephone or during office visit within next 7-10 days. Will continue to follow as necessary for the remaining 30 days post her Ed visit. NN will reassess for case management needs prior to discharge from [address] [place] [address] on or about 3/22/17.

## 2017-03-13 NOTE — TELEPHONE ENCOUNTER
Rhoda Skaggs @ [address] [phone] notified   Office visit 2/22/2017 Dr Casie Farmer notes states d/c hydralazine and start amplodipine 10 mg

## 2017-03-20 ENCOUNTER — TELEPHONE (OUTPATIENT)
Dept: RHEUMATOLOGY | Age: 82
End: 2017-03-20

## 2017-08-17 ENCOUNTER — TELEPHONE (OUTPATIENT)
Dept: FAMILY MEDICINE CLINIC | Age: 82
End: 2017-08-17

## 2017-08-17 NOTE — TELEPHONE ENCOUNTER
Glendy Carlin from [facility] is calling, Glendy Carlin is requesting a call back to know what diet restrictions the patient is on. Glendy Carlin states at there facility they currently have the patient on a regular diet (heart healthy), but she does show in the patients history that the patient is a type 2 diabetic and would like to know if Dr. Paul Perez would like to change the patients diet to a diabetic diet (still heart healthy, but all sugars are replaced with concentrated sugars).     Best call back # for Glendy Carlin: [phone]

## 2017-10-25 NOTE — TELEPHONE ENCOUNTER
----- Message from Easton Wilson sent at 10/24/2017  5:16 PM EDT -----  Regarding: Nena Scruggs/Telephone  Pt son, Miguel Esparza, is requesting for Kael Barrett to give him a call regarding on of the pt Rx. Mr. Leroy East best contact number is [phone].

## 2017-10-27 NOTE — MR AVS SNAPSHOT
Visit Information Date & Time Provider Department Dept. Phone Encounter #  
 10/27/2017  2:00 PM Rylan Perry, [address] [phone] [id number] Upcoming Health Maintenance Date Due FOBT Q 1 YEAR, 18+ 4/5/1950 DTaP/Tdap/Td series (1 - Tdap) 4/5/1953 Pneumococcal 65+ Low/Medium Risk (2 of 2 - PCV13) 2/22/2014 GLAUCOMA SCREENING Q2Y 9/11/2016 MEDICARE YEARLY EXAM 2/16/2018 Allergies as of 10/27/2017  Review Complete On: 10/27/2017 By: Rylan Perry NP Severity Noted Reaction Type Reactions Codeine  05/27/2010    Nausea and Vomiting Current Immunizations  Reviewed on 10/27/2017 Name Date Influenza High Dose Vaccine PF 2/15/2017, 10/23/2014 Influenza Vaccine 11/12/2013 Influenza Vaccine Split 11/16/2012  1:22 PM, 11/15/2012  6:17 PM  
 Influenza Vaccine Whole 10/27/2011 Pneumococcal Polysaccharide (PPSV-23) 2/22/2013 Reviewed by Eduard Gomes LPN on 26/78/0468 at  2:35 PM  
You Were Diagnosed With   
  
 Codes Comments Cough    -  Primary ICD-10-CM: X83 ICD-9-CM: 786.2 Interstitial lung disease ([facility]     ICD-10-CM: J84.9 ICD-9-CM: 333 Rash     ICD-10-CM: R21 
ICD-9-CM: 782.1 Vitals BP Pulse Temp Resp Height(growth percentile) SpO2  
 100/89 (BP 1 Location: Left arm, BP Patient Position: Sitting) 60 98.3 °F (36.8 °C) (Oral) 18 4' 10\" (1.473 m) 97% OB Status Smoking Status Postmenopausal Never Smoker Vitals History Preferred Pharmacy Pharmacy Name Phone [address] [facility]. [phone] Your Updated Medication List  
  
   
This list is accurate as of: 10/27/17  2:51 PM.  Always use your most recent med list.  
  
  
  
  
 acetaminophen 500 mg tablet Commonly known as:  TYLENOL Take 1,000 mg by mouth daily as needed. albuterol 90 mcg/actuation inhaler Commonly known as:  PROVENTIL HFA, VENTOLIN HFA, PROAIR HFA Take 1 Puff by inhalation every four (4) hours as needed for Wheezing. amLODIPine 10 mg tablet Commonly known as:  [place] Take 1 Tab by mouth daily. aspirin delayed-release 81 mg tablet Take 1 tablet by mouth daily. donepezil 10 mg tablet Commonly known as:  ARICEPT  
TAKE 1 TABLET BY MOUTH NIGHTLY. ferrous sulfate 325 mg (65 mg iron) EC tablet Commonly known as:  IRON Take 1 Tab by mouth Daily (before breakfast). methylPREDNISolone 4 mg tablet Commonly known as:  Barbarann [place] Use as directed  
  
 multivitamin tablet Commonly known as:  ONE A DAY Take 1 Tab by mouth daily. VITAMIN D3 1,000 unit tablet Generic drug:  cholecalciferol Take 2,000 Units by mouth daily. ZOFRAN ODT 4 mg disintegrating tablet Generic drug:  ondansetron Take 4 mg by mouth every eight (8) hours as needed for Nausea. Prescriptions Sent to Pharmacy Refills  
 methylPREDNISolone (MEDROL DOSEPACK) 4 mg tablet 0 Sig: Use as directed Class: Normal  
 Pharmacy: [facility] [address] [place] [address] AT [address] [place] [address]. Ph #: [phone]  
 albuterol (PROVENTIL HFA, VENTOLIN HFA, PROAIR HFA) 90 mcg/actuation inhaler 3 Sig: Take 1 Puff by inhalation every four (4) hours as needed for Wheezing. Class: Normal  
 Pharmacy: [facility] [address] [place] [address] AT [address] [place] [address]. Ph #: [phone] Route: Inhalation We Performed the Following REFERRAL TO DERMATOLOGY [REF19 Custom] Comments:  
 Please evaluate patient for rash on top of her head Referral Information Referral ID Referred By Referred To  
  
 [id number] Laura Lebron MD [facility] [address] [place] [address] [place], [facility] Phone: [phone] Fax: [phone] Visits Status Start Date End Date 1 New Request 10/27/17 10/27/18 If your referral has a status of pending review or denied, additional information will be sent to support the outcome of this decision. Introducing [facility] SERVICES! Jt Antonio introduces [employer] patient portal. Now you can access parts of your medical record, email your doctor's office, and request medication refills online. 1. In your internet browser, go to [email or web address] 2. Click on the First Time User? Click Here link in the Sign In box. You will see the New Member Sign Up page. 3. Enter your [employer] Access Code exactly as it appears below. You will not need to use this code after youve completed the sign-up process. If you do not sign up before the expiration date, you must request a new code. · [employer] Access Code: [email or web address] Expires: 1/25/2018  2:51 PM 
 
4. Enter the last four digits of your Social Security Number (xxxx) and Date of Birth (mm/dd/yyyy) as indicated and click Submit. You will be taken to the next sign-up page. 5. Create a [employer] ID. This will be your [employer] login ID and cannot be changed, so think of one that is secure and easy to remember. 6. Create a [employer] password. You can change your password at any time. 7. Enter your Password Reset Question and Answer. This can be used at a later time if you forget your password. 8. Enter your e-mail address. You will receive e-mail notification when new information is available in [address] 9. Click Sign Up. You can now view and download portions of your medical record. 10. Click the Download Summary menu link to download a portable copy of your medical information. If you have questions, please visit the Frequently Asked Questions section of the [employer] website. Remember, [employer] is NOT to be used for urgent needs. For medical emergencies, dial 911. Now available from your iPhone and Android! Please provide this summary of care documentation to your next provider. Your primary care clinician is listed as Meg Tran. If you have any questions after today's visit, please call [phone].

## 2017-10-27 NOTE — PROGRESS NOTES
HISTORY OF PRESENT ILLNESS  Elvie Alanis is a 80 y.o. female. HPI:Patient is accompanied by her son who reports dry cough for several days. Using albuterol inhaler with out much, cough is both day and night and keeping patient up at night. Currently she is living with her son, who take scare of her. Denies chills, fever, purulent mucus. Rash: Son reports rash on her head for six months. He has not discussed it with her PCP. Past Medical History:   Diagnosis Date    CAD (coronary artery disease)     CAD    Dementia in Alzheimer's disease     Depression     DM type 2 causing vascular disease ([facility]     GERD (gastroesophageal reflux disease)     Hx of tuberculosis     as child    Hypercholesterolemia     Hypertension     Osteoporosis, post-menopausal     Rheumatoid arthritis(714.0)      Past Surgical History:   Procedure Laterality Date    HX CATARACT REMOVAL      left with lens implant    HX CORONARY ARTERY BYPASS GRAFT      HX CORONARY STENT PLACEMENT      HX KNEE REPLACEMENT Left     HX MOHS PROCEDURES      right     Allergies   Allergen Reactions    Codeine Nausea and Vomiting     Current Outpatient Prescriptions:     methylPREDNISolone (MEDROL DOSEPACK) 4 mg tablet, Use as directed, Disp: 1 Dose Pack, Rfl: 0    albuterol (PROVENTIL HFA, VENTOLIN HFA, PROAIR HFA) 90 mcg/actuation inhaler, Take 1 Puff by inhalation every four (4) hours as needed for Wheezing., Disp: 1 Inhaler, Rfl: 3    amLODIPine (NORVASC) 10 mg tablet, Take 1 Tab by mouth daily. , Disp: 30 Tab, Rfl: 5    donepezil (ARICEPT) 10 mg tablet, TAKE 1 TABLET BY MOUTH NIGHTLY., Disp: 30 Tab, Rfl: 5    ferrous sulfate (IRON) 325 mg (65 mg iron) EC tablet, Take 1 Tab by mouth Daily (before breakfast). , Disp: 30 Tab, Rfl: 5    multivitamin (ONE A DAY) tablet, Take 1 Tab by mouth daily. , Disp: , Rfl:     aspirin delayed-release 81 mg tablet, Take 1 tablet by mouth daily. , Disp: 30 tablet, Rfl: 11    cholecalciferol (VITAMIN D3) 1,000 unit tablet, Take 2,000 Units by mouth daily. , Disp: , Rfl:     acetaminophen (TYLENOL) 500 mg tablet, Take 1,000 mg by mouth daily as needed. , Disp: , Rfl:     ondansetron (ZOFRAN ODT) 4 mg disintegrating tablet, Take 4 mg by mouth every eight (8) hours as needed for Nausea., Disp: , Rfl:   Review of Systems   Constitutional: Negative. HENT: Negative. Respiratory: Positive for cough. Negative for hemoptysis, sputum production, shortness of breath and wheezing. Cardiovascular: Negative. Gastrointestinal: Negative. Skin: Positive for rash. Physical Exam   Constitutional: No distress. HENT:   Mouth/Throat: Oropharynx is clear and moist.   Neck: Neck supple. Cardiovascular: Normal rate and regular rhythm. No murmur heard. Pulmonary/Chest: Effort normal and breath sounds normal.   Abdominal: Soft. Bowel sounds are normal.   Skin:   Confluent, cream colored lesion on top of her head   Nursing note and vitals reviewed. ASSESSMENT and PLAN    ICD-10-CM ICD-9-CM    1. Cough R05 786.2 methylPREDNISolone (MEDROL DOSEPACK) 4 mg tablet   2. Interstitial lung disease (HCC) J84.9 515 albuterol (PROVENTIL HFA, VENTOLIN HFA, PROAIR HFA) 90 mcg/actuation inhaler   3.  Rash R21 782.1 REFERRAL TO DERMATOLOGY   advised to follow up if not improved  Pt was given an after visit summary which includes diagnosis, current medicines and vital and voiced understanding of treatment plan

## 2017-10-27 NOTE — PROGRESS NOTES
1. Have you been to the ER, urgent care clinic since your last visit? Hospitalized since your last visit? No    2. Have you seen or consulted any other health care providers outside of the [address] [place] [address] since your last visit? Include any pap smears or colon screening.  No     Chief Complaint   Patient presents with    Cough     patient has a cough that sounds raspy,more persisitent at night    Skin Problem     patient here has rash on forehead    Medication Refill     needs refills    Immunization/Injection     patietn here for FLU and pneumoccal vaccien     Learning assessment complete

## 2017-12-12 NOTE — TELEPHONE ENCOUNTER
[address] [place] [address]   -   [phone]    Checking status of fax that was submitted for supplies-    Need verbal on an order that was submitted as well-

## 2018-01-01 ENCOUNTER — TELEPHONE (OUTPATIENT)
Dept: FAMILY MEDICINE CLINIC | Age: 83
End: 2018-01-01

## 2018-01-01 ENCOUNTER — PATIENT OUTREACH (OUTPATIENT)
Dept: CASE MANAGEMENT | Age: 83
End: 2018-01-01

## 2018-01-01 ENCOUNTER — APPOINTMENT (OUTPATIENT)
Dept: GENERAL RADIOLOGY | Age: 83
DRG: 811 | End: 2018-01-01
Attending: PHYSICIAN ASSISTANT
Payer: MEDICARE

## 2018-01-01 ENCOUNTER — HOSPITAL ENCOUNTER (INPATIENT)
Age: 83
LOS: 5 days | Discharge: NURSING, REHAB OR LONG-TERM CARE | DRG: 811 | End: 2018-06-08
Attending: EMERGENCY MEDICINE | Admitting: INTERNAL MEDICINE
Payer: MEDICARE

## 2018-01-01 ENCOUNTER — PATIENT OUTREACH (OUTPATIENT)
Dept: FAMILY MEDICINE CLINIC | Age: 83
End: 2018-01-01

## 2018-01-01 ENCOUNTER — APPOINTMENT (OUTPATIENT)
Dept: CT IMAGING | Age: 83
DRG: 811 | End: 2018-01-01
Payer: MEDICARE

## 2018-01-01 ENCOUNTER — OFFICE VISIT (OUTPATIENT)
Dept: FAMILY MEDICINE CLINIC | Age: 83
End: 2018-01-01

## 2018-01-01 VITALS
BODY MASS INDEX: 19.94 KG/M2 | HEIGHT: 58 IN | DIASTOLIC BLOOD PRESSURE: 71 MMHG | HEART RATE: 110 BPM | SYSTOLIC BLOOD PRESSURE: 163 MMHG | TEMPERATURE: 99.6 F | OXYGEN SATURATION: 93 % | WEIGHT: 95 LBS | RESPIRATION RATE: 43 BRPM

## 2018-01-01 VITALS
DIASTOLIC BLOOD PRESSURE: 74 MMHG | SYSTOLIC BLOOD PRESSURE: 122 MMHG | RESPIRATION RATE: 18 BRPM | HEIGHT: 58 IN | WEIGHT: 90.4 LBS | BODY MASS INDEX: 18.97 KG/M2 | HEART RATE: 65 BPM | OXYGEN SATURATION: 99 % | TEMPERATURE: 98 F

## 2018-01-01 DIAGNOSIS — Z00.00 MEDICARE ANNUAL WELLNESS VISIT, SUBSEQUENT: Primary | ICD-10-CM

## 2018-01-01 DIAGNOSIS — R41.3 IMPAIRED MEMORY: ICD-10-CM

## 2018-01-01 DIAGNOSIS — L21.9 SEBORRHEIC DERMATITIS: ICD-10-CM

## 2018-01-01 DIAGNOSIS — R79.89 ELEVATED BRAIN NATRIURETIC PEPTIDE (BNP) LEVEL: ICD-10-CM

## 2018-01-01 DIAGNOSIS — I25.119 CORONARY ARTERY DISEASE INVOLVING NATIVE CORONARY ARTERY OF NATIVE HEART WITH ANGINA PECTORIS (HCC): ICD-10-CM

## 2018-01-01 DIAGNOSIS — F02.80 DEMENTIA IN ALZHEIMER'S DISEASE (HCC): ICD-10-CM

## 2018-01-01 DIAGNOSIS — J45.20 MILD INTERMITTENT ASTHMA WITHOUT COMPLICATION: ICD-10-CM

## 2018-01-01 DIAGNOSIS — Z66 DNR (DO NOT RESUSCITATE): ICD-10-CM

## 2018-01-01 DIAGNOSIS — R53.1 WEAKNESS GENERALIZED: ICD-10-CM

## 2018-01-01 DIAGNOSIS — Z71.89 GOALS OF CARE, COUNSELING/DISCUSSION: ICD-10-CM

## 2018-01-01 DIAGNOSIS — J84.9 INTERSTITIAL LUNG DISEASE (HCC): ICD-10-CM

## 2018-01-01 DIAGNOSIS — Z86.39 HISTORY OF DIABETES MELLITUS: ICD-10-CM

## 2018-01-01 DIAGNOSIS — M05.79 RHEUMATOID ARTHRITIS INVOLVING MULTIPLE SITES WITH POSITIVE RHEUMATOID FACTOR (HCC): ICD-10-CM

## 2018-01-01 DIAGNOSIS — E87.5 SERUM POTASSIUM ELEVATED: ICD-10-CM

## 2018-01-01 DIAGNOSIS — I10 ESSENTIAL HYPERTENSION: ICD-10-CM

## 2018-01-01 DIAGNOSIS — R09.02 HYPOXIA: ICD-10-CM

## 2018-01-01 DIAGNOSIS — G30.9 DEMENTIA IN ALZHEIMER'S DISEASE (HCC): ICD-10-CM

## 2018-01-01 DIAGNOSIS — N17.9 ACUTE RENAL FAILURE, UNSPECIFIED ACUTE RENAL FAILURE TYPE (HCC): ICD-10-CM

## 2018-01-01 DIAGNOSIS — J84.10 PULMONARY FIBROSIS (HCC): ICD-10-CM

## 2018-01-01 DIAGNOSIS — R06.02 SOB (SHORTNESS OF BREATH): Primary | ICD-10-CM

## 2018-01-01 DIAGNOSIS — D64.9 ANEMIA, UNSPECIFIED TYPE: ICD-10-CM

## 2018-01-01 DIAGNOSIS — E55.9 VITAMIN D DEFICIENCY: ICD-10-CM

## 2018-01-01 DIAGNOSIS — E88.09 HYPOALBUMINEMIA: ICD-10-CM

## 2018-01-01 DIAGNOSIS — R53.81 PHYSICAL DEBILITY: ICD-10-CM

## 2018-01-01 DIAGNOSIS — D63.8 ANEMIA, CHRONIC DISEASE: ICD-10-CM

## 2018-01-01 DIAGNOSIS — D64.9 NORMOCYTIC ANEMIA, NOT DUE TO BLOOD LOSS: ICD-10-CM

## 2018-01-01 DIAGNOSIS — R21 SKIN RASH: ICD-10-CM

## 2018-01-01 LAB
25(OH)D3+25(OH)D2 SERPL-MCNC: 31.3 NG/ML (ref 30–100)
ABO + RH BLD: NORMAL
ALBUMIN SERPL ELPH-MCNC: 1.8 G/DL (ref 2.9–4.4)
ALBUMIN SERPL-MCNC: 1.4 G/DL (ref 3.5–5)
ALBUMIN SERPL-MCNC: 1.5 G/DL (ref 3.5–5)
ALBUMIN SERPL-MCNC: 2.9 G/DL (ref 3.5–4.7)
ALBUMIN/GLOB SERPL: 0.2 {RATIO} (ref 1.1–2.2)
ALBUMIN/GLOB SERPL: 0.2 {RATIO} (ref 1.1–2.2)
ALBUMIN/GLOB SERPL: 0.3 {RATIO} (ref 0.7–1.7)
ALBUMIN/GLOB SERPL: 0.4 {RATIO} (ref 1.2–2.2)
ALP SERPL-CCNC: 104 U/L (ref 45–117)
ALP SERPL-CCNC: 108 U/L (ref 45–117)
ALP SERPL-CCNC: 90 IU/L (ref 39–117)
ALPHA1 GLOB SERPL ELPH-MCNC: 0.4 G/DL (ref 0–0.4)
ALPHA2 GLOB SERPL ELPH-MCNC: 1.1 G/DL (ref 0.4–1)
ALT SERPL-CCNC: 12 IU/L (ref 0–32)
ALT SERPL-CCNC: 13 U/L (ref 12–78)
ALT SERPL-CCNC: 15 U/L (ref 12–78)
ANION GAP SERPL CALC-SCNC: 10 MMOL/L (ref 5–15)
ANION GAP SERPL CALC-SCNC: 6 MMOL/L (ref 5–15)
ANION GAP SERPL CALC-SCNC: 6 MMOL/L (ref 5–15)
ANION GAP SERPL CALC-SCNC: 7 MMOL/L (ref 5–15)
ANION GAP SERPL CALC-SCNC: 9 MMOL/L (ref 5–15)
ANTI-COMPLEMENT (C3B,C3D): NORMAL
ANTIGENS PRESENT RBC DONR: NORMAL
APPEARANCE UR: ABNORMAL
AST SERPL-CCNC: 22 U/L (ref 15–37)
AST SERPL-CCNC: 23 IU/L (ref 0–40)
AST SERPL-CCNC: 28 U/L (ref 15–37)
ATRIAL RATE: 89 BPM
B-GLOBULIN SERPL ELPH-MCNC: 1.2 G/DL (ref 0.7–1.3)
BACTERIA URNS QL MICRO: NEGATIVE /HPF
BASOPHILS # BLD: 0 K/UL (ref 0–0.1)
BASOPHILS # BLD: 0 K/UL (ref 0–0.1)
BASOPHILS NFR BLD: 0 % (ref 0–1)
BASOPHILS NFR BLD: 0 % (ref 0–1)
BILIRUB SERPL-MCNC: 0.2 MG/DL (ref 0.2–1)
BILIRUB SERPL-MCNC: 0.3 MG/DL (ref 0.2–1)
BILIRUB SERPL-MCNC: 0.3 MG/DL (ref 0–1.2)
BILIRUB UR QL: NEGATIVE
BLD GP AB SCN SERPL QL ELUTION: NORMAL
BLD PROD TYP BPU: NORMAL
BLOOD BANK CMNT PATIENT-IMP: NORMAL
BLOOD GROUP ANTIBODIES SERPL: NORMAL
BLOOD GROUP ANTIBODIES SERPL: NORMAL
BNP SERPL-MCNC: 4179 PG/ML (ref 0–450)
BPU ID: NORMAL
BUN SERPL-MCNC: 25 MG/DL (ref 6–20)
BUN SERPL-MCNC: 26 MG/DL (ref 6–20)
BUN SERPL-MCNC: 26 MG/DL (ref 8–27)
BUN SERPL-MCNC: 28 MG/DL (ref 6–20)
BUN SERPL-MCNC: 32 MG/DL (ref 6–20)
BUN SERPL-MCNC: 35 MG/DL (ref 6–20)
BUN/CREAT SERPL: 25 (ref 12–20)
BUN/CREAT SERPL: 25 (ref 12–20)
BUN/CREAT SERPL: 27 (ref 12–20)
BUN/CREAT SERPL: 27 (ref 12–20)
BUN/CREAT SERPL: 30 (ref 12–20)
BUN/CREAT SERPL: 38 (ref 12–28)
CALCIUM SERPL-MCNC: 10.7 MG/DL (ref 8.5–10.1)
CALCIUM SERPL-MCNC: 7.8 MG/DL (ref 8.5–10.1)
CALCIUM SERPL-MCNC: 7.8 MG/DL (ref 8.5–10.1)
CALCIUM SERPL-MCNC: 8.1 MG/DL (ref 8.5–10.1)
CALCIUM SERPL-MCNC: 8.1 MG/DL (ref 8.5–10.1)
CALCIUM SERPL-MCNC: 8.5 MG/DL (ref 8.7–10.3)
CALCIUM SERPL-MCNC: 8.7 MG/DL (ref 8.5–10.1)
CALCULATED P AXIS, ECG09: 69 DEGREES
CALCULATED R AXIS, ECG10: -11 DEGREES
CALCULATED T AXIS, ECG11: 88 DEGREES
CHLORIDE SERPL-SCNC: 102 MMOL/L (ref 96–106)
CHLORIDE SERPL-SCNC: 106 MMOL/L (ref 97–108)
CHLORIDE SERPL-SCNC: 107 MMOL/L (ref 97–108)
CHLORIDE SERPL-SCNC: 108 MMOL/L (ref 97–108)
CHOLEST SERPL-MCNC: 141 MG/DL (ref 100–199)
CO2 SERPL-SCNC: 22 MMOL/L (ref 21–32)
CO2 SERPL-SCNC: 23 MMOL/L (ref 21–32)
CO2 SERPL-SCNC: 24 MMOL/L (ref 18–29)
CO2 SERPL-SCNC: 24 MMOL/L (ref 21–32)
CO2 SERPL-SCNC: 24 MMOL/L (ref 21–32)
CO2 SERPL-SCNC: 25 MMOL/L (ref 21–32)
COLOR UR: ABNORMAL
CREAT SERPL-MCNC: 0.69 MG/DL (ref 0.57–1)
CREAT SERPL-MCNC: 1.02 MG/DL (ref 0.55–1.02)
CREAT SERPL-MCNC: 1.04 MG/DL (ref 0.55–1.02)
CREAT SERPL-MCNC: 1.05 MG/DL (ref 0.55–1.02)
CREAT SERPL-MCNC: 1.17 MG/DL (ref 0.55–1.02)
CREAT SERPL-MCNC: 1.18 MG/DL (ref 0.55–1.02)
CREAT UR-MCNC: 103.57 MG/DL
CROSSMATCH RESULT,%XM: NORMAL
DAT IGG-SP REAG RBC QL: NORMAL
DAT POLY-SP REAG RBC QL: NORMAL
DIAGNOSIS, 93000: NORMAL
DIFFERENTIAL METHOD BLD: ABNORMAL
DIFFERENTIAL METHOD BLD: ABNORMAL
EOSINOPHIL # BLD: 0.4 K/UL (ref 0–0.4)
EOSINOPHIL # BLD: 0.5 K/UL (ref 0–0.4)
EOSINOPHIL NFR BLD: 4 % (ref 0–7)
EOSINOPHIL NFR BLD: 4 % (ref 0–7)
EPITH CASTS URNS QL MICRO: ABNORMAL /LPF
ERYTHROCYTE [DISTWIDTH] IN BLOOD BY AUTOMATED COUNT: 18 % (ref 11.5–14.5)
ERYTHROCYTE [DISTWIDTH] IN BLOOD BY AUTOMATED COUNT: 18 % (ref 11.5–14.5)
ERYTHROCYTE [DISTWIDTH] IN BLOOD BY AUTOMATED COUNT: 18.2 % (ref 11.5–14.5)
ERYTHROCYTE [DISTWIDTH] IN BLOOD BY AUTOMATED COUNT: 18.4 % (ref 11.5–14.5)
ERYTHROCYTE [DISTWIDTH] IN BLOOD BY AUTOMATED COUNT: 19 % (ref 12.3–15.4)
EST. AVERAGE GLUCOSE BLD GHB EST-MCNC: 94 MG/DL
FERRITIN SERPL-MCNC: 324 NG/ML (ref 8–252)
FERRITIN SERPL-MCNC: 336 NG/ML (ref 15–150)
FOLATE SERPL-MCNC: 56.7 NG/ML (ref 5–21)
GAMMA GLOB SERPL ELPH-MCNC: 4.1 G/DL (ref 0.4–1.8)
GFR SERPLBLD CREATININE-BSD FMLA CKD-EPI: 79 ML/MIN/1.73
GFR SERPLBLD CREATININE-BSD FMLA CKD-EPI: 91 ML/MIN/1.73
GLOBULIN SER CALC-MCNC: 6.7 G/DL (ref 1.5–4.5)
GLOBULIN SER CALC-MCNC: 6.8 G/DL (ref 2.2–3.9)
GLOBULIN SER CALC-MCNC: 7.8 G/DL (ref 2–4)
GLOBULIN SER CALC-MCNC: 8.4 G/DL (ref 2–4)
GLUCOSE SERPL-MCNC: 114 MG/DL (ref 65–100)
GLUCOSE SERPL-MCNC: 114 MG/DL (ref 65–100)
GLUCOSE SERPL-MCNC: 87 MG/DL (ref 65–99)
GLUCOSE SERPL-MCNC: 88 MG/DL (ref 65–100)
GLUCOSE SERPL-MCNC: 89 MG/DL (ref 65–100)
GLUCOSE SERPL-MCNC: 89 MG/DL (ref 65–100)
GLUCOSE UR STRIP.AUTO-MCNC: NEGATIVE MG/DL
HAPTOGLOB SERPL-MCNC: 356 MG/DL (ref 30–200)
HBA1C MFR BLD: 4.9 % (ref 4.8–5.6)
HCT VFR BLD AUTO: 21.2 % (ref 35–47)
HCT VFR BLD AUTO: 22.2 % (ref 35–47)
HCT VFR BLD AUTO: 22.5 % (ref 35–47)
HCT VFR BLD AUTO: 26.6 % (ref 34–46.6)
HCT VFR BLD AUTO: 32.1 % (ref 35–47)
HCT VFR BLD AUTO: 33.1 % (ref 35–47)
HCT VFR BLD AUTO: 35.5 % (ref 35–47)
HDLC SERPL-MCNC: 45 MG/DL
HEMOCCULT STL QL: NEGATIVE
HEMOCCULT STL QL: NEGATIVE
HGB BLD-MCNC: 10.2 G/DL (ref 11.5–16)
HGB BLD-MCNC: 10.4 G/DL (ref 11.5–16)
HGB BLD-MCNC: 11 G/DL (ref 11.5–16)
HGB BLD-MCNC: 6.4 G/DL (ref 11.5–16)
HGB BLD-MCNC: 6.7 G/DL (ref 11.5–16)
HGB BLD-MCNC: 6.8 G/DL (ref 11.5–16)
HGB BLD-MCNC: 8.1 G/DL (ref 11.1–15.9)
HGB UR QL STRIP: NEGATIVE
IGA SERPL-MCNC: 1216 MG/DL (ref 64–422)
IGG SERPL-MCNC: 3643 MG/DL (ref 700–1600)
IGM SERPL-MCNC: 376 MG/DL (ref 26–217)
IMM GRANULOCYTES # BLD: 0.1 K/UL (ref 0–0.04)
IMM GRANULOCYTES # BLD: 0.2 K/UL (ref 0–0.04)
IMM GRANULOCYTES NFR BLD AUTO: 1 % (ref 0–0.5)
IMM GRANULOCYTES NFR BLD AUTO: 2 % (ref 0–0.5)
INTERPRETATION, 910389: NORMAL
IRON SATN MFR SERPL: 10 % (ref 15–55)
IRON SATN MFR SERPL: 20 % (ref 20–50)
IRON SERPL-MCNC: 16 UG/DL (ref 27–139)
IRON SERPL-MCNC: 24 UG/DL (ref 35–150)
KAPPA LC FREE SER-MCNC: 477.5 MG/L (ref 3.3–19.4)
KAPPA LC FREE/LAMBDA FREE SER: 1.53 {RATIO} (ref 0.26–1.65)
KETONES UR QL STRIP.AUTO: ABNORMAL MG/DL
LAMBDA LC FREE SERPL-MCNC: 312.6 MG/L (ref 5.7–26.3)
LDH SERPL L TO P-CCNC: 208 U/L (ref 81–246)
LDLC SERPL CALC-MCNC: 69 MG/DL (ref 0–99)
LEUKOCYTE ESTERASE UR QL STRIP.AUTO: ABNORMAL
LYMPHOCYTES # BLD: 1.5 K/UL (ref 0.8–3.5)
LYMPHOCYTES # BLD: 2.1 K/UL (ref 0.8–3.5)
LYMPHOCYTES NFR BLD: 14 % (ref 12–49)
LYMPHOCYTES NFR BLD: 17 % (ref 12–49)
M PROTEIN SERPL ELPH-MCNC: ABNORMAL G/DL
MAGNESIUM SERPL-MCNC: 1.9 MG/DL (ref 1.6–2.4)
MCH RBC QN AUTO: 24.9 PG (ref 26–34)
MCH RBC QN AUTO: 25.4 PG (ref 26–34)
MCH RBC QN AUTO: 25.6 PG (ref 26–34)
MCH RBC QN AUTO: 25.6 PG (ref 26–34)
MCH RBC QN AUTO: 25.9 PG (ref 26.6–33)
MCHC RBC AUTO-ENTMCNC: 30.2 G/DL (ref 30–36.5)
MCHC RBC AUTO-ENTMCNC: 30.5 G/DL (ref 31.5–35.7)
MCHC RBC AUTO-ENTMCNC: 30.6 G/DL (ref 30–36.5)
MCHC RBC AUTO-ENTMCNC: 31 G/DL (ref 30–36.5)
MCHC RBC AUTO-ENTMCNC: 31.4 G/DL (ref 30–36.5)
MCV RBC AUTO: 81.3 FL (ref 80–99)
MCV RBC AUTO: 82.5 FL (ref 80–99)
MCV RBC AUTO: 82.6 FL (ref 80–99)
MCV RBC AUTO: 82.8 FL (ref 80–99)
MCV RBC AUTO: 85 FL (ref 79–97)
MONOCYTES # BLD: 0.5 K/UL (ref 0–1)
MONOCYTES # BLD: 0.7 K/UL (ref 0–1)
MONOCYTES NFR BLD: 4 % (ref 5–13)
MONOCYTES NFR BLD: 6 % (ref 5–13)
NEUTS SEG # BLD: 8.2 K/UL (ref 1.8–8)
NEUTS SEG # BLD: 9.3 K/UL (ref 1.8–8)
NEUTS SEG NFR BLD: 74 % (ref 32–75)
NEUTS SEG NFR BLD: 75 % (ref 32–75)
NITRITE UR QL STRIP.AUTO: NEGATIVE
NRBC # BLD: 0.03 K/UL (ref 0–0.01)
NRBC # BLD: 0.06 K/UL (ref 0–0.01)
NRBC BLD-RTO: 0.3 PER 100 WBC
NRBC BLD-RTO: 0.5 PER 100 WBC
NRBC BLD-RTO: 0.6 PER 100 WBC
NRBC BLD-RTO: 0.7 PER 100 WBC
P-R INTERVAL, ECG05: 278 MS
PH UR STRIP: 5.5 [PH] (ref 5–8)
PHOSPHATE SERPL-MCNC: 4.2 MG/DL (ref 2.6–4.7)
PLATELET # BLD AUTO: 509 K/UL (ref 150–400)
PLATELET # BLD AUTO: 580 K/UL (ref 150–400)
PLATELET # BLD AUTO: 620 K/UL (ref 150–400)
PLATELET # BLD AUTO: 643 K/UL (ref 150–400)
PLATELET # BLD AUTO: 643 X10E3/UL (ref 150–379)
PMV BLD AUTO: 8.1 FL (ref 8.9–12.9)
PMV BLD AUTO: 8.4 FL (ref 8.9–12.9)
PMV BLD AUTO: 8.5 FL (ref 8.9–12.9)
PMV BLD AUTO: 8.5 FL (ref 8.9–12.9)
POTASSIUM SERPL-SCNC: 3.8 MMOL/L (ref 3.5–5.1)
POTASSIUM SERPL-SCNC: 4 MMOL/L (ref 3.5–5.1)
POTASSIUM SERPL-SCNC: 4.1 MMOL/L (ref 3.5–5.1)
POTASSIUM SERPL-SCNC: 4.2 MMOL/L (ref 3.5–5.1)
POTASSIUM SERPL-SCNC: 4.4 MMOL/L (ref 3.5–5.1)
POTASSIUM SERPL-SCNC: 5.3 MMOL/L (ref 3.5–5.2)
PROT PATTERN SERPL IFE-IMP: ABNORMAL
PROT SERPL-MCNC: 8.6 G/DL (ref 6–8.5)
PROT SERPL-MCNC: 9.2 G/DL (ref 6.4–8.2)
PROT SERPL-MCNC: 9.6 G/DL (ref 6–8.5)
PROT SERPL-MCNC: 9.9 G/DL (ref 6.4–8.2)
PROT UR STRIP-MCNC: ABNORMAL MG/DL
PTH RELATED PROT SERPL-SCNC: <2 PMOL/L
PTH-INTACT SERPL-MCNC: 27.4 PG/ML (ref 18.4–88)
Q-T INTERVAL, ECG07: 354 MS
QRS DURATION, ECG06: 80 MS
QTC CALCULATION (BEZET), ECG08: 430 MS
RBC # BLD AUTO: 2.57 M/UL (ref 3.8–5.2)
RBC # BLD AUTO: 2.68 M/UL (ref 3.8–5.2)
RBC # BLD AUTO: 3.13 X10E6/UL (ref 3.77–5.28)
RBC # BLD AUTO: 4.07 M/UL (ref 3.8–5.2)
RBC # BLD AUTO: 4.3 M/UL (ref 3.8–5.2)
RBC #/AREA URNS HPF: ABNORMAL /HPF (ref 0–5)
RBC MORPH BLD: ABNORMAL
RETICS # AUTO: 0.11 M/UL (ref 0.02–0.08)
RETICS/RBC NFR AUTO: 4 % (ref 0.7–2.1)
SODIUM SERPL-SCNC: 135 MMOL/L (ref 136–145)
SODIUM SERPL-SCNC: 138 MMOL/L (ref 136–145)
SODIUM SERPL-SCNC: 139 MMOL/L (ref 134–144)
SODIUM SERPL-SCNC: 139 MMOL/L (ref 136–145)
SODIUM SERPL-SCNC: 140 MMOL/L (ref 136–145)
SODIUM SERPL-SCNC: 141 MMOL/L (ref 136–145)
SODIUM UR-SCNC: 82 MMOL/L
SP GR UR REFRACTOMETRY: 1.02 (ref 1–1.03)
SPECIMEN EXP DATE BLD: NORMAL
STATUS OF UNIT,%ST: NORMAL
TIBC SERPL-MCNC: 120 UG/DL (ref 250–450)
TIBC SERPL-MCNC: 156 UG/DL (ref 250–450)
TRIGL SERPL-MCNC: 135 MG/DL (ref 0–149)
TROPONIN I SERPL-MCNC: 0.04 NG/ML
TSH SERPL DL<=0.05 MIU/L-ACNC: 3.82 UIU/ML (ref 0.36–3.74)
UIBC SERPL-MCNC: 140 UG/DL (ref 118–369)
UNIT DIVISION, %UDIV: 0
UR CULT HOLD, URHOLD: NORMAL
UROBILINOGEN UR QL STRIP.AUTO: 0.2 EU/DL (ref 0.2–1)
VENTRICULAR RATE, ECG03: 89 BPM
VIT B12 SERPL-MCNC: 1324 PG/ML (ref 193–986)
VLDLC SERPL CALC-MCNC: 27 MG/DL (ref 5–40)
WBC # BLD AUTO: 10.9 K/UL (ref 3.6–11)
WBC # BLD AUTO: 11.3 K/UL (ref 3.6–11)
WBC # BLD AUTO: 12.6 K/UL (ref 3.6–11)
WBC # BLD AUTO: 7.2 X10E3/UL (ref 3.4–10.8)
WBC # BLD AUTO: 9 K/UL (ref 3.6–11)
WBC URNS QL MICRO: ABNORMAL /HPF (ref 0–4)

## 2018-01-01 PROCEDURE — 84100 ASSAY OF PHOSPHORUS: CPT | Performed by: INTERNAL MEDICINE

## 2018-01-01 PROCEDURE — 86860 RBC ANTIBODY ELUTION: CPT

## 2018-01-01 PROCEDURE — 85027 COMPLETE CBC AUTOMATED: CPT | Performed by: INTERNAL MEDICINE

## 2018-01-01 PROCEDURE — 77030038269 HC DRN EXT URIN PURWCK BARD -A

## 2018-01-01 PROCEDURE — 82607 VITAMIN B-12: CPT | Performed by: INTERNAL MEDICINE

## 2018-01-01 PROCEDURE — 86880 COOMBS TEST DIRECT: CPT | Performed by: EMERGENCY MEDICINE

## 2018-01-01 PROCEDURE — 77010033678 HC OXYGEN DAILY

## 2018-01-01 PROCEDURE — 36415 COLL VENOUS BLD VENIPUNCTURE: CPT | Performed by: EMERGENCY MEDICINE

## 2018-01-01 PROCEDURE — 74011250636 HC RX REV CODE- 250/636: Performed by: INTERNAL MEDICINE

## 2018-01-01 PROCEDURE — 86870 RBC ANTIBODY IDENTIFICATION: CPT | Performed by: EMERGENCY MEDICINE

## 2018-01-01 PROCEDURE — 84484 ASSAY OF TROPONIN QUANT: CPT | Performed by: PHYSICIAN ASSISTANT

## 2018-01-01 PROCEDURE — 77030029684 HC NEB SM VOL KT MONA -A

## 2018-01-01 PROCEDURE — 82272 OCCULT BLD FECES 1-3 TESTS: CPT | Performed by: INTERNAL MEDICINE

## 2018-01-01 PROCEDURE — 85025 COMPLETE CBC W/AUTO DIFF WBC: CPT | Performed by: INTERNAL MEDICINE

## 2018-01-01 PROCEDURE — 85045 AUTOMATED RETICULOCYTE COUNT: CPT | Performed by: NURSE PRACTITIONER

## 2018-01-01 PROCEDURE — 80048 BASIC METABOLIC PNL TOTAL CA: CPT | Performed by: INTERNAL MEDICINE

## 2018-01-01 PROCEDURE — 83540 ASSAY OF IRON: CPT | Performed by: INTERNAL MEDICINE

## 2018-01-01 PROCEDURE — 74011000250 HC RX REV CODE- 250: Performed by: INTERNAL MEDICINE

## 2018-01-01 PROCEDURE — 97161 PT EVAL LOW COMPLEX 20 MIN: CPT

## 2018-01-01 PROCEDURE — 94640 AIRWAY INHALATION TREATMENT: CPT

## 2018-01-01 PROCEDURE — 82728 ASSAY OF FERRITIN: CPT | Performed by: INTERNAL MEDICINE

## 2018-01-01 PROCEDURE — 93306 TTE W/DOPPLER COMPLETE: CPT

## 2018-01-01 PROCEDURE — 97116 GAIT TRAINING THERAPY: CPT

## 2018-01-01 PROCEDURE — 30233P1 TRANSFUSION OF NONAUTOLOGOUS FROZEN RED CELLS INTO PERIPHERAL VEIN, PERCUTANEOUS APPROACH: ICD-10-PCS | Performed by: INTERNAL MEDICINE

## 2018-01-01 PROCEDURE — 36415 COLL VENOUS BLD VENIPUNCTURE: CPT | Performed by: INTERNAL MEDICINE

## 2018-01-01 PROCEDURE — 71250 CT THORAX DX C-: CPT

## 2018-01-01 PROCEDURE — 82272 OCCULT BLD FECES 1-3 TESTS: CPT | Performed by: EMERGENCY MEDICINE

## 2018-01-01 PROCEDURE — 86880 COOMBS TEST DIRECT: CPT

## 2018-01-01 PROCEDURE — 86922 COMPATIBILITY TEST ANTIGLOB: CPT | Performed by: EMERGENCY MEDICINE

## 2018-01-01 PROCEDURE — 86870 RBC ANTIBODY IDENTIFICATION: CPT

## 2018-01-01 PROCEDURE — 83880 ASSAY OF NATRIURETIC PEPTIDE: CPT | Performed by: PHYSICIAN ASSISTANT

## 2018-01-01 PROCEDURE — 65660000000 HC RM CCU STEPDOWN

## 2018-01-01 PROCEDURE — 93306 TTE W/DOPPLER COMPLETE: CPT | Performed by: INTERNAL MEDICINE

## 2018-01-01 PROCEDURE — 85025 COMPLETE CBC W/AUTO DIFF WBC: CPT | Performed by: PHYSICIAN ASSISTANT

## 2018-01-01 PROCEDURE — 86902 BLOOD TYPE ANTIGEN DONOR EA: CPT | Performed by: EMERGENCY MEDICINE

## 2018-01-01 PROCEDURE — 86850 RBC ANTIBODY SCREEN: CPT

## 2018-01-01 PROCEDURE — 36415 COLL VENOUS BLD VENIPUNCTURE: CPT | Performed by: PHYSICIAN ASSISTANT

## 2018-01-01 PROCEDURE — 84165 PROTEIN E-PHORESIS SERUM: CPT | Performed by: NURSE PRACTITIONER

## 2018-01-01 PROCEDURE — 86920 COMPATIBILITY TEST SPIN: CPT | Performed by: EMERGENCY MEDICINE

## 2018-01-01 PROCEDURE — 86906 BLD TYPING SEROLOGIC RH PHNT: CPT

## 2018-01-01 PROCEDURE — 94762 N-INVAS EAR/PLS OXIMTRY CONT: CPT

## 2018-01-01 PROCEDURE — 36430 TRANSFUSION BLD/BLD COMPNT: CPT

## 2018-01-01 PROCEDURE — 80053 COMPREHEN METABOLIC PANEL: CPT | Performed by: PHYSICIAN ASSISTANT

## 2018-01-01 PROCEDURE — 82570 ASSAY OF URINE CREATININE: CPT | Performed by: INTERNAL MEDICINE

## 2018-01-01 PROCEDURE — 83615 LACTATE (LD) (LDH) ENZYME: CPT | Performed by: NURSE PRACTITIONER

## 2018-01-01 PROCEDURE — 86900 BLOOD TYPING SEROLOGIC ABO: CPT | Performed by: EMERGENCY MEDICINE

## 2018-01-01 PROCEDURE — 80053 COMPREHEN METABOLIC PANEL: CPT | Performed by: NURSE PRACTITIONER

## 2018-01-01 PROCEDURE — 86860 RBC ANTIBODY ELUTION: CPT | Performed by: EMERGENCY MEDICINE

## 2018-01-01 PROCEDURE — 85014 HEMATOCRIT: CPT | Performed by: PHYSICIAN ASSISTANT

## 2018-01-01 PROCEDURE — 97530 THERAPEUTIC ACTIVITIES: CPT

## 2018-01-01 PROCEDURE — 74011250637 HC RX REV CODE- 250/637: Performed by: INTERNAL MEDICINE

## 2018-01-01 PROCEDURE — P9016 RBC LEUKOCYTES REDUCED: HCPCS | Performed by: EMERGENCY MEDICINE

## 2018-01-01 PROCEDURE — 82746 ASSAY OF FOLIC ACID SERUM: CPT | Performed by: INTERNAL MEDICINE

## 2018-01-01 PROCEDURE — 83735 ASSAY OF MAGNESIUM: CPT | Performed by: INTERNAL MEDICINE

## 2018-01-01 PROCEDURE — 86921 COMPATIBILITY TEST INCUBATE: CPT | Performed by: EMERGENCY MEDICINE

## 2018-01-01 PROCEDURE — 83010 ASSAY OF HAPTOGLOBIN QUANT: CPT | Performed by: NURSE PRACTITIONER

## 2018-01-01 PROCEDURE — 71046 X-RAY EXAM CHEST 2 VIEWS: CPT

## 2018-01-01 PROCEDURE — 82784 ASSAY IGA/IGD/IGG/IGM EACH: CPT | Performed by: NURSE PRACTITIONER

## 2018-01-01 PROCEDURE — 93005 ELECTROCARDIOGRAM TRACING: CPT

## 2018-01-01 PROCEDURE — 97165 OT EVAL LOW COMPLEX 30 MIN: CPT

## 2018-01-01 PROCEDURE — 82397 CHEMILUMINESCENT ASSAY: CPT | Performed by: NURSE PRACTITIONER

## 2018-01-01 PROCEDURE — 83883 ASSAY NEPHELOMETRY NOT SPEC: CPT | Performed by: NURSE PRACTITIONER

## 2018-01-01 PROCEDURE — 99285 EMERGENCY DEPT VISIT HI MDM: CPT

## 2018-01-01 PROCEDURE — 84443 ASSAY THYROID STIM HORMONE: CPT | Performed by: INTERNAL MEDICINE

## 2018-01-01 PROCEDURE — 81001 URINALYSIS AUTO W/SCOPE: CPT | Performed by: PHYSICIAN ASSISTANT

## 2018-01-01 PROCEDURE — 85018 HEMOGLOBIN: CPT | Performed by: INTERNAL MEDICINE

## 2018-01-01 PROCEDURE — 83970 ASSAY OF PARATHORMONE: CPT | Performed by: NURSE PRACTITIONER

## 2018-01-01 PROCEDURE — 77030032490 HC SLV COMPR SCD KNE COVD -B

## 2018-01-01 PROCEDURE — 76450000000

## 2018-01-01 PROCEDURE — 84300 ASSAY OF URINE SODIUM: CPT | Performed by: INTERNAL MEDICINE

## 2018-01-01 RX ORDER — LANOLIN ALCOHOL/MO/W.PET/CERES
CREAM (GRAM) TOPICAL
Qty: 30 TAB | Refills: 0 | Status: CANCELLED | OUTPATIENT
Start: 2018-01-01

## 2018-01-01 RX ORDER — ALBUTEROL SULFATE 90 UG/1
1 AEROSOL, METERED RESPIRATORY (INHALATION)
Qty: 1 INHALER | Refills: 3 | Status: SHIPPED | OUTPATIENT
Start: 2018-01-01

## 2018-01-01 RX ORDER — AMLODIPINE BESYLATE 5 MG/1
5 TABLET ORAL
Status: DISCONTINUED | OUTPATIENT
Start: 2018-01-01 | End: 2018-01-01 | Stop reason: HOSPADM

## 2018-01-01 RX ORDER — IPRATROPIUM BROMIDE AND ALBUTEROL SULFATE 2.5; .5 MG/3ML; MG/3ML
3 SOLUTION RESPIRATORY (INHALATION)
Status: DISCONTINUED | OUTPATIENT
Start: 2018-01-01 | End: 2018-01-01 | Stop reason: HOSPADM

## 2018-01-01 RX ORDER — AMLODIPINE BESYLATE 10 MG/1
5 TABLET ORAL
COMMUNITY

## 2018-01-01 RX ORDER — ASPIRIN 81 MG/1
81 TABLET ORAL DAILY
Status: DISCONTINUED | OUTPATIENT
Start: 2018-01-01 | End: 2018-01-01

## 2018-01-01 RX ORDER — IPRATROPIUM BROMIDE AND ALBUTEROL SULFATE 2.5; .5 MG/3ML; MG/3ML
3 SOLUTION RESPIRATORY (INHALATION)
Qty: 30 NEBULE | Refills: 0 | Status: SHIPPED
Start: 2018-01-01

## 2018-01-01 RX ORDER — ALBUTEROL SULFATE 1.25 MG/3ML
2.5 SOLUTION RESPIRATORY (INHALATION)
Status: DISCONTINUED | OUTPATIENT
Start: 2018-01-01 | End: 2018-01-01

## 2018-01-01 RX ORDER — PANTOPRAZOLE SODIUM 40 MG/1
40 TABLET, DELAYED RELEASE ORAL
Status: DISCONTINUED | OUTPATIENT
Start: 2018-01-01 | End: 2018-01-01 | Stop reason: HOSPADM

## 2018-01-01 RX ORDER — FUROSEMIDE 10 MG/ML
20 INJECTION INTRAMUSCULAR; INTRAVENOUS ONCE
Status: COMPLETED | OUTPATIENT
Start: 2018-01-01 | End: 2018-01-01

## 2018-01-01 RX ORDER — LANOLIN ALCOHOL/MO/W.PET/CERES
1 CREAM (GRAM) TOPICAL
Status: DISCONTINUED | OUTPATIENT
Start: 2018-01-01 | End: 2018-01-01 | Stop reason: HOSPADM

## 2018-01-01 RX ORDER — KETOCONAZOLE 20 MG/G
CREAM TOPICAL
Qty: 15 G | Refills: 0 | Status: SHIPPED | OUTPATIENT
Start: 2018-01-01

## 2018-01-01 RX ORDER — SODIUM CHLORIDE 0.9 % (FLUSH) 0.9 %
5-10 SYRINGE (ML) INJECTION EVERY 8 HOURS
Status: DISCONTINUED | OUTPATIENT
Start: 2018-01-01 | End: 2018-01-01 | Stop reason: HOSPADM

## 2018-01-01 RX ORDER — ALPRAZOLAM 0.25 MG/1
0.25 TABLET ORAL
Status: DISCONTINUED | OUTPATIENT
Start: 2018-01-01 | End: 2018-01-01 | Stop reason: HOSPADM

## 2018-01-01 RX ORDER — POLYETHYLENE GLYCOL 3350 17 G/17G
17 POWDER, FOR SOLUTION ORAL
Status: CANCELLED | OUTPATIENT
Start: 2018-01-01 | End: 2018-01-01

## 2018-01-01 RX ORDER — AMLODIPINE BESYLATE 5 MG/1
5 TABLET ORAL DAILY
Status: DISCONTINUED | OUTPATIENT
Start: 2018-01-01 | End: 2018-01-01

## 2018-01-01 RX ORDER — AMLODIPINE BESYLATE 10 MG/1
TABLET ORAL
Qty: 90 TAB | Refills: 1 | Status: CANCELLED | OUTPATIENT
Start: 2018-01-01

## 2018-01-01 RX ORDER — ACETAMINOPHEN 500 MG
1000 TABLET ORAL
COMMUNITY

## 2018-01-01 RX ORDER — ACETAMINOPHEN 500 MG
1000 TABLET ORAL
Status: DISCONTINUED | OUTPATIENT
Start: 2018-01-01 | End: 2018-01-01 | Stop reason: HOSPADM

## 2018-01-01 RX ORDER — AMLODIPINE BESYLATE 5 MG/1
5 TABLET ORAL DAILY
Qty: 90 TAB | Refills: 0 | Status: SHIPPED | OUTPATIENT
Start: 2018-01-01 | End: 2018-01-01

## 2018-01-01 RX ORDER — SODIUM CHLORIDE 9 MG/ML
75 INJECTION, SOLUTION INTRAVENOUS CONTINUOUS
Status: DISCONTINUED | OUTPATIENT
Start: 2018-01-01 | End: 2018-01-01

## 2018-01-01 RX ORDER — SODIUM CHLORIDE 0.9 % (FLUSH) 0.9 %
5-10 SYRINGE (ML) INJECTION AS NEEDED
Status: DISCONTINUED | OUTPATIENT
Start: 2018-01-01 | End: 2018-01-01 | Stop reason: HOSPADM

## 2018-01-01 RX ORDER — PANTOPRAZOLE SODIUM 40 MG/1
40 TABLET, DELAYED RELEASE ORAL
Qty: 30 TAB | Refills: 0 | Status: SHIPPED
Start: 2018-01-01

## 2018-01-01 RX ORDER — SODIUM CHLORIDE 9 MG/ML
250 INJECTION, SOLUTION INTRAVENOUS AS NEEDED
Status: DISCONTINUED | OUTPATIENT
Start: 2018-01-01 | End: 2018-01-01 | Stop reason: HOSPADM

## 2018-01-01 RX ORDER — FUROSEMIDE 10 MG/ML
40 INJECTION INTRAMUSCULAR; INTRAVENOUS ONCE
Status: COMPLETED | OUTPATIENT
Start: 2018-01-01 | End: 2018-01-01

## 2018-01-01 RX ORDER — THERA TABS 400 MCG
1 TAB ORAL DAILY
Status: DISCONTINUED | OUTPATIENT
Start: 2018-01-01 | End: 2018-01-01 | Stop reason: HOSPADM

## 2018-01-01 RX ORDER — HYDRALAZINE HYDROCHLORIDE 20 MG/ML
10 INJECTION INTRAMUSCULAR; INTRAVENOUS
Status: DISCONTINUED | OUTPATIENT
Start: 2018-01-01 | End: 2018-01-01 | Stop reason: HOSPADM

## 2018-01-01 RX ORDER — ALPRAZOLAM 0.25 MG/1
0.25 TABLET ORAL
Qty: 10 TAB | Refills: 0 | Status: SHIPPED | OUTPATIENT
Start: 2018-01-01

## 2018-01-01 RX ADMIN — IPRATROPIUM BROMIDE AND ALBUTEROL SULFATE 3 ML: .5; 3 SOLUTION RESPIRATORY (INHALATION) at 11:28

## 2018-01-01 RX ADMIN — IPRATROPIUM BROMIDE AND ALBUTEROL SULFATE 3 ML: .5; 3 SOLUTION RESPIRATORY (INHALATION) at 07:04

## 2018-01-01 RX ADMIN — FUROSEMIDE 40 MG: 10 INJECTION, SOLUTION INTRAMUSCULAR; INTRAVENOUS at 18:03

## 2018-01-01 RX ADMIN — Medication 10 ML: at 13:52

## 2018-01-01 RX ADMIN — ACETAMINOPHEN 1000 MG: 500 TABLET ORAL at 21:41

## 2018-01-01 RX ADMIN — IPRATROPIUM BROMIDE AND ALBUTEROL SULFATE 3 ML: .5; 3 SOLUTION RESPIRATORY (INHALATION) at 11:24

## 2018-01-01 RX ADMIN — Medication 10 ML: at 21:28

## 2018-01-01 RX ADMIN — IPRATROPIUM BROMIDE AND ALBUTEROL SULFATE 3 ML: .5; 3 SOLUTION RESPIRATORY (INHALATION) at 07:44

## 2018-01-01 RX ADMIN — SODIUM CHLORIDE 75 ML/HR: 900 INJECTION, SOLUTION INTRAVENOUS at 00:16

## 2018-01-01 RX ADMIN — Medication 10 ML: at 21:41

## 2018-01-01 RX ADMIN — AMLODIPINE BESYLATE 5 MG: 5 TABLET ORAL at 09:07

## 2018-01-01 RX ADMIN — FUROSEMIDE 20 MG: 10 INJECTION, SOLUTION INTRAMUSCULAR; INTRAVENOUS at 13:26

## 2018-01-01 RX ADMIN — IPRATROPIUM BROMIDE AND ALBUTEROL SULFATE 3 ML: .5; 3 SOLUTION RESPIRATORY (INHALATION) at 07:19

## 2018-01-01 RX ADMIN — ACETAMINOPHEN 1000 MG: 500 TABLET ORAL at 05:05

## 2018-01-01 RX ADMIN — FERROUS SULFATE TAB 325 MG (65 MG ELEMENTAL FE) 325 MG: 325 (65 FE) TAB at 10:36

## 2018-01-01 RX ADMIN — PANTOPRAZOLE SODIUM 40 MG: 40 TABLET, DELAYED RELEASE ORAL at 06:00

## 2018-01-01 RX ADMIN — PANTOPRAZOLE SODIUM 40 MG: 40 TABLET, DELAYED RELEASE ORAL at 09:09

## 2018-01-01 RX ADMIN — IPRATROPIUM BROMIDE AND ALBUTEROL SULFATE 3 ML: .5; 3 SOLUTION RESPIRATORY (INHALATION) at 11:10

## 2018-01-01 RX ADMIN — FERROUS SULFATE TAB 325 MG (65 MG ELEMENTAL FE) 325 MG: 325 (65 FE) TAB at 08:51

## 2018-01-01 RX ADMIN — IPRATROPIUM BROMIDE AND ALBUTEROL SULFATE 3 ML: .5; 3 SOLUTION RESPIRATORY (INHALATION) at 11:09

## 2018-01-01 RX ADMIN — ALPRAZOLAM 0.25 MG: 0.25 TABLET ORAL at 21:33

## 2018-01-01 RX ADMIN — IPRATROPIUM BROMIDE AND ALBUTEROL SULFATE 3 ML: .5; 3 SOLUTION RESPIRATORY (INHALATION) at 16:25

## 2018-01-01 RX ADMIN — Medication 10 ML: at 04:15

## 2018-01-01 RX ADMIN — FERROUS SULFATE TAB 325 MG (65 MG ELEMENTAL FE) 325 MG: 325 (65 FE) TAB at 09:09

## 2018-01-01 RX ADMIN — IPRATROPIUM BROMIDE AND ALBUTEROL SULFATE 3 ML: .5; 3 SOLUTION RESPIRATORY (INHALATION) at 11:46

## 2018-01-01 RX ADMIN — THERA TABS 1 TABLET: TAB at 09:09

## 2018-01-01 RX ADMIN — THERA TABS 1 TABLET: TAB at 09:07

## 2018-01-01 RX ADMIN — Medication 10 ML: at 13:26

## 2018-01-01 RX ADMIN — ACETAMINOPHEN 1000 MG: 500 TABLET ORAL at 22:09

## 2018-01-01 RX ADMIN — THERA TABS 1 TABLET: TAB at 09:01

## 2018-01-01 RX ADMIN — ACETAMINOPHEN 1000 MG: 500 TABLET ORAL at 04:15

## 2018-01-01 RX ADMIN — IPRATROPIUM BROMIDE AND ALBUTEROL SULFATE 3 ML: .5; 3 SOLUTION RESPIRATORY (INHALATION) at 15:06

## 2018-01-01 RX ADMIN — PANTOPRAZOLE SODIUM 40 MG: 40 TABLET, DELAYED RELEASE ORAL at 10:36

## 2018-01-01 RX ADMIN — FERROUS SULFATE TAB 325 MG (65 MG ELEMENTAL FE) 325 MG: 325 (65 FE) TAB at 09:07

## 2018-01-01 RX ADMIN — SODIUM CHLORIDE 75 ML/HR: 900 INJECTION, SOLUTION INTRAVENOUS at 06:06

## 2018-01-01 RX ADMIN — ACETAMINOPHEN 1000 MG: 500 TABLET ORAL at 18:35

## 2018-01-01 RX ADMIN — IPRATROPIUM BROMIDE AND ALBUTEROL SULFATE 3 ML: .5; 3 SOLUTION RESPIRATORY (INHALATION) at 20:00

## 2018-01-01 RX ADMIN — AMLODIPINE BESYLATE 5 MG: 5 TABLET ORAL at 08:51

## 2018-01-01 RX ADMIN — AMLODIPINE BESYLATE 5 MG: 5 TABLET ORAL at 09:01

## 2018-01-01 RX ADMIN — Medication 10 ML: at 06:51

## 2018-01-01 RX ADMIN — ACETAMINOPHEN 1000 MG: 500 TABLET ORAL at 21:40

## 2018-01-01 RX ADMIN — AMLODIPINE BESYLATE 5 MG: 5 TABLET ORAL at 09:09

## 2018-01-01 RX ADMIN — ALPRAZOLAM 0.25 MG: 0.25 TABLET ORAL at 18:05

## 2018-01-01 RX ADMIN — PANTOPRAZOLE SODIUM 40 MG: 40 TABLET, DELAYED RELEASE ORAL at 06:08

## 2018-01-01 RX ADMIN — Medication 10 ML: at 00:08

## 2018-01-01 RX ADMIN — IPRATROPIUM BROMIDE AND ALBUTEROL SULFATE 3 ML: .5; 3 SOLUTION RESPIRATORY (INHALATION) at 15:51

## 2018-01-01 RX ADMIN — IPRATROPIUM BROMIDE AND ALBUTEROL SULFATE 3 ML: .5; 3 SOLUTION RESPIRATORY (INHALATION) at 07:41

## 2018-01-01 RX ADMIN — ACETAMINOPHEN 1000 MG: 500 TABLET ORAL at 21:27

## 2018-01-01 RX ADMIN — Medication 10 ML: at 21:42

## 2018-01-01 RX ADMIN — IPRATROPIUM BROMIDE AND ALBUTEROL SULFATE 3 ML: .5; 3 SOLUTION RESPIRATORY (INHALATION) at 19:41

## 2018-01-01 RX ADMIN — IPRATROPIUM BROMIDE AND ALBUTEROL SULFATE 3 ML: .5; 3 SOLUTION RESPIRATORY (INHALATION) at 20:02

## 2018-01-01 RX ADMIN — PANTOPRAZOLE SODIUM 40 MG: 40 TABLET, DELAYED RELEASE ORAL at 08:51

## 2018-01-01 RX ADMIN — ALPRAZOLAM 0.25 MG: 0.25 TABLET ORAL at 22:09

## 2018-01-01 RX ADMIN — Medication 10 ML: at 06:00

## 2018-01-01 RX ADMIN — FERROUS SULFATE TAB 325 MG (65 MG ELEMENTAL FE) 325 MG: 325 (65 FE) TAB at 09:01

## 2018-01-01 RX ADMIN — IPRATROPIUM BROMIDE AND ALBUTEROL SULFATE 3 ML: .5; 3 SOLUTION RESPIRATORY (INHALATION) at 19:44

## 2018-01-01 RX ADMIN — ACETAMINOPHEN 1000 MG: 500 TABLET ORAL at 17:14

## 2018-01-01 RX ADMIN — THERA TABS 1 TABLET: TAB at 08:51

## 2018-01-01 RX ADMIN — Medication 10 ML: at 22:09

## 2018-01-01 RX ADMIN — IPRATROPIUM BROMIDE AND ALBUTEROL SULFATE 3 ML: .5; 3 SOLUTION RESPIRATORY (INHALATION) at 15:00

## 2018-01-01 RX ADMIN — IPRATROPIUM BROMIDE AND ALBUTEROL SULFATE 3 ML: .5; 3 SOLUTION RESPIRATORY (INHALATION) at 08:05

## 2018-01-01 RX ADMIN — IPRATROPIUM BROMIDE AND ALBUTEROL SULFATE 3 ML: .5; 3 SOLUTION RESPIRATORY (INHALATION) at 15:17

## 2018-01-01 RX ADMIN — HYDRALAZINE HYDROCHLORIDE 10 MG: 20 INJECTION INTRAMUSCULAR; INTRAVENOUS at 01:47

## 2018-02-07 NOTE — TELEPHONE ENCOUNTER
[phone] spoke to Hima advised her that we did received the form and patient had appointment for office visit but patient no show patient needs office visit before we can fill out the form Hima understand

## 2018-02-07 NOTE — TELEPHONE ENCOUNTER
Please call Baltazar Ganser w/[facility] adult day services. She said she has faxed over annual medical statement to be filled out and has not received it back. She said she faxed it twice so far. Her number is [phone]. Fax [phone].

## 2018-03-26 NOTE — TELEPHONE ENCOUNTER
[phone] to [employer] advised to re fax forms and patient is seeing Dr Radha Fernandez on 4/20/2018 for CPE so she can put on the form attention Dr Radha Fernandez so they will have the form when patient gets her [employer] understand

## 2018-03-26 NOTE — TELEPHONE ENCOUNTER
Representative calling from [address] [place] [address], asking if we received the annual medical statement papers that they faxed over to us twice. She would like a call back regarding this.      [phone]

## 2018-04-20 PROBLEM — J45.20 MILD INTERMITTENT ASTHMA WITHOUT COMPLICATION: Status: ACTIVE | Noted: 2018-01-01

## 2018-04-20 NOTE — PROGRESS NOTES
This is the Subsequent Medicare Annual Wellness Exam, performed 12 months or more after the Initial AWV or the last Subsequent AWV    I have reviewed the patient's medical history in detail and updated the computerized patient record. History     Dementia:   Some issues remember which day it is but memory is stable with no deterioration  Play dominio with her son   No behavioral issues or wandering  Attends day services and need form completed for this  Feels fatigued  Tx: donepezil    OA:   Right knee pain currently. Tx: Tylenol daily and ibuprofen occasionally     DMII:   HA1C  4.5  Off all meds    HTN:   Taking amlodpine 5mg (1/2 of 10 mg tab)  Change made 1 year ago    Fatigue  Chronic  Sleep 12 hours per day. Denies new shortness of breath, histroiy of heat failure    CAD:  Stent   Tx: ASA daily  No chest pain  Plan to see cardiology this year    Mild Intermittent Asthma:    Albuterol used once per week. No hospitalizations.      Normocytic Anemia:   Hgb 8 in 2016  Not taking Fe 325 any longer    Weight Loss:   Occurred over 5 year time span  Weight 100>> 88lb over past 3 years  Weight up 1 lb since last visit  Breakfast 2 peice of toast or omelet or fruit  Lunch at center so unknown  Dinner small portion, eating 50% of small plate of food    Dry skin of forehead  Uses aveeno and coconut oil with improvement    History of Depression:   Mood stable  DAVID: 0  PHQ over the last two weeks 4/22/2018   Little interest or pleasure in doing things Several days   Feeling down, depressed or hopeless Not at all   Total Score PHQ 2 1   Trouble falling or staying asleep, or sleeping too much Several days   Feeling tired or having little energy Not at all   Poor appetite or overeating Several days   Feeling bad about yourself - or that you are a failure or have let yourself or your family down Not at all   Trouble concentrating on things such as school, work, reading or watching TV Several days   Moving or speaking so slowly that other people could have noticed; or the opposite being so fidgety that others notice Not at all   Thoughts of being better off dead, or hurting yourself in some way Not at all   PHQ 9 Score 4   How difficult have these problems made it for you to do your work, take care of your home and get along with others Somewhat difficult     Unknown of ever had a colonoscopy. Past Medical History:   Diagnosis Date    CAD (coronary artery disease)     CAD    Dementia in Alzheimer's disease     Depression     DM type 2 causing vascular disease (HCC)     GERD (gastroesophageal reflux disease)     Hx of tuberculosis     as child    Hypercholesterolemia     Hypertension     Osteoporosis, post-menopausal     Rheumatoid arthritis(714.0)       Past Surgical History:   Procedure Laterality Date    HX CATARACT REMOVAL      left with lens implant    HX CORONARY ARTERY BYPASS GRAFT      HX CORONARY STENT PLACEMENT      HX KNEE REPLACEMENT Left     HX MOHS PROCEDURES      right     Current Outpatient Prescriptions   Medication Sig Dispense Refill    amLODIPine (NORVASC) 10 mg tablet TAKE 1 TABLET BY MOUTH DAILY FOR BLOOD PRESSURE 90 Tab 1    albuterol (PROVENTIL HFA, VENTOLIN HFA, PROAIR HFA) 90 mcg/actuation inhaler Take 1 Puff by inhalation every four (4) hours as needed for Wheezing. 1 Inhaler 3    donepezil (ARICEPT) 10 mg tablet TAKE 1 TABLET BY MOUTH NIGHTLY. 30 Tab 5    multivitamin (ONE A DAY) tablet Take 1 Tab by mouth daily.  aspirin delayed-release 81 mg tablet Take 1 tablet by mouth daily. 30 tablet 11    cholecalciferol (VITAMIN D3) 1,000 unit tablet Take 2,000 Units by mouth daily.  acetaminophen (TYLENOL) 500 mg tablet Take 1,000 mg by mouth daily as needed.       ferrous sulfate 325 mg (65 mg iron) tablet TAKE 1 TABLET BY MOUTH DAILY BEFORE BREAKFAST 30 Tab 0    ondansetron (ZOFRAN ODT) 4 mg disintegrating tablet Take 4 mg by mouth every eight (8) hours as needed for Nausea. Allergies   Allergen Reactions    Codeine Nausea and Vomiting     Family History   Problem Relation Age of Onset    Colon Cancer Mother     Cancer Father     Heart Disease Child            Social History   Substance Use Topics    Smoking status: Never Smoker    Smokeless tobacco: Never Used    Alcohol use No     Patient Active Problem List   Diagnosis Code    Rheumatoid arthritis ([facility] M06.9    Mixed hyperlipidemia E78.2    Generalized anxiety disorder F41.1    Cervical spondylosis without myelopathy M47.812    CAD (coronary artery disease) I25.10    Osteoporosis, post-menopausal M81.0    Interstitial lung disease ([facility] J84.9    Bradycardia R00.1    Hypertension I10    GERD (gastroesophageal reflux disease) K21.9    Depression F32.9    Dementia in Alzheimer's disease G30.9, F02.80       Objective:      Visit Vitals    /74 (BP 1 Location: Left arm, BP Patient Position: Sitting)    Pulse 65    Temp 98 °F (36.7 °C) (Oral)    Resp 18    Ht 4' 10\" (1.473 m)    Wt 90 lb 6.4 oz (41 kg)    SpO2 99%    BMI 18.89 kg/m2     Physical Examination:  General: Alert, cooperative, no distress, appears stated age. Thin  Eyes: Conjunctivae/corneas clear. PERRL, EOMs intact. Ears: Normal external ear canals both ears. Nose: Nares normal. Septum midline. Mucosa normal. No drainage or sinus tenderness. Mouth/Throat: Lips, mucosa, and tongue normal. Teeth and gums normal.  Neck: Supple, symmetrical, trachea midline, no adenopathy. No thyroid enlargement/tenderness/nodules  Back: Symmetric, no curvature. ROM normal. No CVA tenderness. Lungs: Clear to auscultation bilaterally. Normal inspiratory and expiratory ratio. Heart: Regular rate and rhythm, S1, S2 normal, no murmur, click, rub or gallop. Abdomen: Soft, non-tender. Bowel sounds normal. No masses or organomegaly. Extremities: Extremities normal, atraumatic, no cyanosis or edema. Pulses: 2+ and symmetric all extremities.   Skin: hyperpigmented plaque ~6-7 cm with loss of skin lines on forehead, flaking scalp  Lymph nodes: Cervical, supraclavicular nodes normal.  Neurologic: CNII-XII intact. Strength 5/5 grossly. Sensation and reflexes normal throughout. Depression Risk Factor Screening:     PHQ over the last two weeks 3/23/2016   Little interest or pleasure in doing things Not at all   Feeling down, depressed or hopeless Not at all   Total Score PHQ 2 0     Alcohol Risk Factor Screening: You do not drink alcohol or very rarely. Functional Ability and Level of Safety:   Hearing Loss  Hearing is good. Activities of Daily Living  The home contains: handrails    ADL Assessment 4/20/2018   Feeding yourself No Help Needed   Getting from bed to chair No Help Needed   Getting dressed No Help Needed   Bathing or showering Help Needed   Walk across the room (includes cane/walker) No Help Needed   Using the telphone No Help Needed   Taking your medications Help Needed   Preparing meals Help Needed   Managing money (expenses/bills) Help Needed   Moderately strenuous housework (laundry) Help Needed   Shopping for personal items (toiletries/medicines) Help Needed   Shopping for groceries Help Needed   Driving Help Needed   Climbing a flight of stairs No Help Needed   Getting to places beyond walking distances Help Needed       Fall Risk  Fall Risk Assessment, last 12 mths 10/27/2017   Able to walk? Yes   Fall in past 12 months?  No       Abuse Screen  Patient is not abused    Cognitive Screening   Evaluation of Cognitive Function:  Has your family/caregiver stated any concerns about your memory:   AD    Patient Care Team   Patient Care Team:  Antoinette Tong MD as PCP - General (Family Practice)  Denny Pelayo MD as Consulting Provider (Cardiology)  Ayala Shah RN as Nurse Navigator  Betito Kinney RN as Ambulatory Care Navigator           Assessment/Plan   Education and counseling provided:  Are appropriate based on today's review and evaluation  End-of-Life planning (with patient's consent)    Diagnoses and all orders for this visit:    1. Medicare annual wellness visit, subsequent  -     HEMOGLOBIN A1C WITH EAG  -     LIPID PANEL  -     METABOLIC PANEL, COMPREHENSIVE  -     CBC W/O DIFF  -     IRON PROFILE  -     FERRITIN  -     VITAMIN D, 25 HYDROXY    2. Anemia, chronic disease  -     CBC W/O DIFF  -     IRON PROFILE  -     FERRITIN    3. Essential hypertension  -     amLODIPine (NORVASC) 5 mg tablet; Take 1 Tab by mouth daily.  -     LIPID PANEL  -     METABOLIC PANEL, COMPREHENSIVE  -     CBC W/O DIFF    4. Mild intermittent asthma without complication  -     albuterol (PROVENTIL HFA, VENTOLIN HFA, PROAIR HFA) 90 mcg/actuation inhaler; Take 1 Puff by inhalation every four (4) hours as needed for Wheezing. 5. Vitamin D deficiency  -     VITAMIN D, 25 HYDROXY    6. History of diabetes mellitus  -     HEMOGLOBIN A1C WITH EAG    7. Skin rash  -     ketoconazole (NIZORAL) 2 % topical cream; Apply  to affected area two (2) times daily as needed for Skin Irritation. 8. Seborrheic dermatitis  -     ketoconazole (NIZORAL) 2 % topical cream; Apply  to affected area two (2) times daily as needed for Skin Irritation. Other orders  -     CVD REPORT        Health Maintenance Due   Topic Date Due    FOBT Q 1 YEAR, 18+  04/05/1950    DTaP/Tdap/Td series (1 - Tdap) 04/05/1953    Pneumococcal 65+ Low/Medium Risk (2 of 2 - PCV13) 02/22/2014    GLAUCOMA SCREENING Q2Y  09/11/2016    MEDICARE YEARLY EXAM  03/14/2018     Suggest nurse navigator conversation for Advanced directive. Durable, DNR signed. Chronic disease reviewed as below  Pneumovax + Shingles vaccine recommend to be done at pharmacy  No FOBT since not willing to complete colonoscopy if positive. BP well controlled. Continue current regimen of low dose amlodipine. If remains well controlled at next visit will trial off amlodipine.      Labs to eval end organ function and chronic diseases to including anemia, htn, vitamin D, and history of DMII    Forehead rash consistent with seborrheic dermatitis.  Trial ketoconazole cream

## 2018-04-20 NOTE — PROGRESS NOTES
Chief Complaint   Patient presents with   [facility] Annual Wellness Visit     1. Have you been to the ER, urgent care clinic since your last visit? Hospitalized since your last visit? No    2. Have you seen or consulted any other health care providers outside of the [address] [place] [address] since your last visit? Include any pap smears or colon screening.  No

## 2018-04-20 NOTE — PATIENT INSTRUCTIONS

## 2018-04-20 NOTE — LETTER
4/24/2018 10:58 AM 
 
Ms. Mali Hess [facility] [id number] Dear Mali Johansen: Please find your most recent results below. Resulted Orders HEMOGLOBIN A1C WITH EAG Result Value Ref Range Hemoglobin A1c 4.9 4.8 - 5.6 % Comment:  
            Pre-diabetes: 5.7 - 6.4 Diabetes: >6.4 Glycemic control for adults with diabetes: <7.0 Estimated average glucose 94 mg/dL Narrative Performed at:  [address]  [id number] : Evita Guerrero MD, Phone:  [phone] LIPID PANEL Result Value Ref Range Cholesterol, total 141 100 - 199 mg/dL Triglyceride 135 0 - 149 mg/dL HDL Cholesterol 45 >39 mg/dL VLDL, calculated 27 5 - 40 mg/dL LDL, calculated 69 0 - 99 mg/dL Narrative Performed at:  [address]  [id number] : Evita Guerrero MD, Phone:  [phone] METABOLIC PANEL, COMPREHENSIVE Result Value Ref Range Glucose 87 65 - 99 mg/dL BUN 26 8 - 27 mg/dL Creatinine 0.69 0.57 - 1.00 mg/dL GFR est non-AA 79 >59 mL/min/1.73 GFR est AA 91 >59 mL/min/1.73  
 BUN/Creatinine ratio 38 (H) 12 - 28 Sodium 139 134 - 144 mmol/L Potassium 5.3 (H) 3.5 - 5.2 mmol/L Chloride 102 96 - 106 mmol/L  
 CO2 24 18 - 29 mmol/L Calcium 8.5 (L) 8.7 - 10.3 mg/dL Protein, total 9.6 (H) 6.0 - 8.5 g/dL Albumin 2.9 (L) 3.5 - 4.7 g/dL GLOBULIN, TOTAL 6.7 (H) 1.5 - 4.5 g/dL A-G Ratio 0.4 (L) 1.2 - 2.2 Bilirubin, total 0.3 0.0 - 1.2 mg/dL Alk. phosphatase 90 39 - 117 IU/L  
 AST (SGOT) 23 0 - 40 IU/L  
 ALT (SGPT) 12 0 - 32 IU/L Narrative Performed at:  [address]  [id number] : Evita Guerrero MD, Phone:  [phone] CBC W/O DIFF Result Value Ref Range WBC 7.2 3.4 - 10.8 x10E3/uL  
 RBC 3.13 (L) 3.77 - 5.28 x10E6/uL HGB 8.1 (L) 11.1 - 15.9 g/dL HCT 26.6 (L) 34.0 - 46.6 % MCV 85 79 - 97 fL  
 MCH 25.9 (L) 26.6 - 33.0 pg  
 MCHC 30.5 (L) 31.5 - 35.7 g/dL  
 RDW 19.0 (H) 12.3 - 15.4 % PLATELET 465 (H) 789 - 379 x10E3/uL Narrative Performed at:  [address]  [id number] : Bessy Kramer MD, Phone:  [phone] IRON PROFILE Result Value Ref Range TIBC 156 (L) 250 - 450 ug/dL UIBC 140 118 - 369 ug/dL Iron 16 (L) 27 - 139 ug/dL Iron % saturation 10 (L) 15 - 55 % Narrative Performed at:  [address]  [id number] : Bessy Kramer MD, Phone:  [phone] FERRITIN Result Value Ref Range Ferritin 336 (H) 15 - 150 ng/mL Narrative Performed at:  [address]  [id number] : Bessy Kramer MD, Phone:  [phone] VITAMIN D, 25 HYDROXY Result Value Ref Range VITAMIN D, 25-HYDROXY 31.3 30.0 - 100.0 ng/mL Comment:  
   Vitamin D deficiency has been defined by the [facility] practice guideline as a 
level of serum 25-OH vitamin D less than 20 ng/mL (1,2). The Endocrine Society went on to further define vitamin D 
insufficiency as a level between 21 and 29 ng/mL (2). 1. IOM ([place] of [employer]). 2010. Dietary reference 
   intakes for calcium and D. [facility]: The 
   [employer]. 2. Jose MF, Anibal NC, Kojo MAIER, et al. 
   Evaluation, treatment, and prevention of vitamin D 
   deficiency: an Endocrine Society clinical practice 
   guideline. JCEM. 2011 Jul; 96(7):1911-30. Narrative Performed at:  [address]  [id number] : Bessy Kramer MD, Phone:  [phone] CVD REPORT Result Value Ref Range INTERPRETATION Note Comment:  
   Supplemental report is available. Narrative Performed at:  [address] [place] [address]  [id number] : Megan Srinivasan PhD, Phone:  [phone] RECOMMENDATIONS: 
Anemia persists but iron level is normal. No further iron supplementation is necessary. This is most likely related to chronic diseases. We will continue to monitor this. Your potassium is a little higher than normal but not at a concerning level. We should recheck this in within 1 month RTC for lab visit. Please call me if you have any questions: [phone] Sincerely, Diana Palmer MD

## 2018-04-20 NOTE — MR AVS SNAPSHOT
April Desir 
 
 
 [address] 
[phone] Patient: Adithya Da Silva MRN: [id number] SJM:[birth year] Visit Information Date & Time Provider Department Dept. Phone Encounter #  
 4/20/2018  3:00 PM Jael Paz  [facility] [phone] [id number] Follow-up Instructions Return in about 6 months (around 10/20/2018) for Follow Up. Upcoming Health Maintenance Date Due FOBT Q 1 YEAR, 18+ 4/5/1950 DTaP/Tdap/Td series (1 - Tdap) 4/5/1953 Pneumococcal 65+ Low/Medium Risk (2 of 2 - PCV13) 2/22/2014 GLAUCOMA SCREENING Q2Y 9/11/2016 MEDICARE YEARLY EXAM 3/14/2018 Allergies as of 4/20/2018  Review Complete On: 4/20/2018 By: Mckenna Garcia LPN Severity Noted Reaction Type Reactions Codeine  05/27/2010    Nausea and Vomiting Current Immunizations  Reviewed on 10/27/2017 Name Date Influenza High Dose Vaccine PF 2/15/2017, 10/23/2014 Influenza Vaccine 11/12/2013 Influenza Vaccine Split 11/16/2012  1:22 PM, 11/15/2012  6:17 PM  
 Influenza Vaccine Whole 10/27/2011 Pneumococcal Polysaccharide (PPSV-23) 2/22/2013 Not reviewed this visit You Were Diagnosed With   
  
 Codes Comments Mild intermittent asthma without complication    -  Primary ICD-10-CM: J45.20 ICD-9-CM: 493.90 Anemia, chronic disease     ICD-10-CM: D63.8 ICD-9-CM: 285.29 Essential hypertension     ICD-10-CM: I10 
ICD-9-CM: 401.9 Vitamin D deficiency     ICD-10-CM: E55.9 ICD-9-CM: 268.9 Medicare annual wellness visit, subsequent     ICD-10-CM: Z00.00 ICD-9-CM: V70.0 History of diabetes mellitus     ICD-10-CM: Z86.39 
ICD-9-CM: V12.29 Skin rash     ICD-10-CM: R21 
ICD-9-CM: 782.1 Seborrheic dermatitis     ICD-10-CM: L21.9 ICD-9-CM: 690.10 Vitals BP Pulse Temp Resp Height(growth percentile) Weight(growth percentile) 122/74 (BP 1 Location: Left arm, BP Patient Position: Sitting) 65 98 °F (36.7 °C) (Oral) 18 4' 10\" (1.473 m) 90 lb 6.4 oz (41 kg) SpO2 BMI OB Status Smoking Status 99% 18.89 kg/m2 Postmenopausal Never Smoker BMI and BSA Data Body Mass Index Body Surface Area  
 18.89 kg/m 2 1.3 m 2 Preferred Pharmacy Pharmacy Name Phone [address] AT [address] [place] [address]. [phone] Your Updated Medication List  
  
   
This list is accurate as of 4/20/18  4:16 PM.  Always use your most recent med list.  
  
  
  
  
 acetaminophen 500 mg tablet Commonly known as:  TYLENOL Take 1,000 mg by mouth daily as needed. albuterol 90 mcg/actuation inhaler Commonly known as:  PROVENTIL HFA, VENTOLIN HFA, PROAIR HFA Take 1 Puff by inhalation every four (4) hours as needed for Wheezing. amLODIPine 5 mg tablet Commonly known as:  Sarah Beth Croft Take 1 Tab by mouth daily. aspirin delayed-release 81 mg tablet Take 1 tablet by mouth daily. ferrous sulfate 325 mg (65 mg iron) tablet TAKE 1 TABLET BY MOUTH DAILY BEFORE BREAKFAST  
  
 ketoconazole 2 % topical cream  
Commonly known as:  NIZORAL Apply  to affected area two (2) times daily as needed for Skin Irritation. multivitamin tablet Commonly known as:  ONE A DAY Take 1 Tab by mouth daily. VITAMIN D3 1,000 unit tablet Generic drug:  cholecalciferol Take 2,000 Units by mouth daily. Prescriptions Sent to Pharmacy Refills  
 albuterol (PROVENTIL HFA, VENTOLIN HFA, PROAIR HFA) 90 mcg/actuation inhaler 3 Sig: Take 1 Puff by inhalation every four (4) hours as needed for Wheezing. Class: Normal  
 Pharmacy: [facility] [address], [address] [place] [address] [address] AT [address] [facility] [address] [address]  #: [phone] Route: Inhalation  
 amLODIPine (NORVASC) 5 mg tablet 0 Sig: Take 1 Tab by mouth daily. Class: Normal  
 Pharmacy: [facility] [address] [place] [address], [address] [place] [address] AT [address] [place] [address] Road. Ph #: [phone] Route: Oral  
 ketoconazole (NIZORAL) 2 % topical cream 0 Sig: Apply  to affected area two (2) times daily as needed for Skin Irritation. Class: Normal  
 Pharmacy: [facility] [address] [place] [address], [address] [place] [address] AT [address] [place] [address]. Ph #: [phone] Route: Topical  
  
We Performed the Following CBC W/O DIFF [07496 CPT(R)] FERRITIN [16061 CPT(R)] HEMOGLOBIN A1C WITH EAG [07096 CPT(R)] IRON PROFILE [id number] CPT(R)] LIPID PANEL [17285 CPT(R)] METABOLIC PANEL, COMPREHENSIVE [43136 CPT(R)] VITAMIN D, 25 HYDROXY [id number] CPT(R)] Follow-up Instructions Return in about 6 months (around 10/20/2018) for Follow Up. Introducing [facility]! [employer] introduces [employer] patient portal. Now you can access parts of your medical record, email your doctor's office, and request medication refills online. 1. In your internet browser, go to [email or web address] 2. Click on the First Time User? Click Here link in the Sign In box. You will see the New Member Sign Up page. 3. Enter your [employer] Access Code exactly as it appears below. You will not need to use this code after youve completed the sign-up process. If you do not sign up before the expiration date, you must request a new code. · [employer] Access Code: [email or web address] Expires: 7/19/2018  4:16 PM 
 
4. Enter the last four digits of your Social Security Number (xxxx) and Date of Birth (mm/dd/yyyy) as indicated and click Submit. You will be taken to the next sign-up page. 5. Create a [employer] ID. This will be your [employer] login ID and cannot be changed, so think of one that is secure and easy to remember. 6. Create a [employer] password. You can change your password at any time. 7. Enter your Password Reset Question and Answer. This can be used at a later time if you forget your password. 8. Enter your e-mail address. You will receive e-mail notification when new information is available in [address] 9. Click Sign Up. You can now view and download portions of your medical record. 10. Click the Download Summary menu link to download a portable copy of your medical information. If you have questions, please visit the Frequently Asked Questions section of the [employer] website. Remember, [employer] is NOT to be used for urgent needs. For medical emergencies, dial 911. Now available from your iPhone and Android! Please provide this summary of care documentation to your next provider. Your primary care clinician is listed as Rollo Lombard. If you have any questions after today's visit, please call [phone].

## 2018-04-23 NOTE — PROGRESS NOTES
Outbound call to both numbers on file. Cell number received voicemail which was full. Home number received voicemail. Left message for patient to return call to office regarding labs.

## 2018-04-23 NOTE — PROGRESS NOTES
Notify Patient:     Most test results are normal     Anemia persists but iron level is normal. No further iron supplementation is necessary. This is most likely related to chronic diseases. We will continue to monitor this. Your potassium is a little higher than normal but not at a concerning level. We should recheck this in within 1 month RTC for lab visit.

## 2018-04-23 NOTE — ACP (ADVANCE CARE PLANNING)
Advance Care Planning (ACP) Provider Note - Comprehensive     Date of ACP Conversation: 04/22/18  Persons included in Conversation:  patient and POA  Length of ACP Conversation in minutes:  <16 minutes (Non-Billable)    Authorized Decision Maker (if patient is incapable of making informed decisions): This person is:  Advance Care Planning 12/14/2016   Patient's Healthcare Decision Maker is: Verbal statement (Legal Next of Kin remains as decision maker)   Primary Decision Maker Name Brianafurt Directive None   Patient Would Like to Complete Advance Directive No             General ACP for ALL Patients with Decision Making Capacity:   Importance of advance care planning, including choosing a healthcare agent to communicate patient's healthcare decisions if patient lost the ability to make decisions, such as after a sudden illness or accident    Review of Existing Advance Directive:  N/a    For Serious or Chronic Illness:  N/a    Interventions Provided:  Durable DNR completed. Patient and POA consented to durable DNR.

## 2018-04-24 NOTE — PROGRESS NOTES
Outbound call to patient. Second attempt. No answer received voicemail that mailbox was full. Unable to leave message. Letter printed with results.

## 2018-06-03 PROBLEM — D64.9 ANEMIA: Status: ACTIVE | Noted: 2018-01-01

## 2018-06-03 PROBLEM — K92.1 MELENA: Status: ACTIVE | Noted: 2018-01-01

## 2018-06-03 PROBLEM — R09.02 HYPOXIA: Status: ACTIVE | Noted: 2018-01-01

## 2018-06-03 PROBLEM — J84.10 PULMONARY FIBROSIS (HCC): Status: ACTIVE | Noted: 2018-01-01

## 2018-06-03 PROBLEM — N17.9 AKI (ACUTE KIDNEY INJURY) (HCC): Status: ACTIVE | Noted: 2018-01-01

## 2018-06-03 NOTE — IP AVS SNAPSHOT
[address] 
[phone] Patient: Ronak Servin MRN: [id number] TTV:4/1/[id number] About your hospitalization You were admitted on:  Leny 3, 2018 You last received care in the:  [facility] 3 PROG CARE TELE 2 You were discharged on:  June 8, 2018 Why you were hospitalized Your primary diagnosis was:  Not on File Your diagnoses also included:  Hypoxia, Cb (Acute Kidney Injury) (Hcc), Rheumatoid Arthritis (Hcc), Mixed Hyperlipidemia, Interstitial Lung Disease (Hcc), Hypertension, Gerd (Gastroesophageal Reflux Disease), Generalized Anxiety Disorder, Dementia In Alzheimer's Disease, Cad (Coronary Artery Disease), Anemia, Melena, Pulmonary Fibrosis (Hcc) Follow-up Information Follow up With Details Comments Contact Info [place]one Valdivia [place]   [address] [address] 
[phone] [facility] ([facility])   [address] 
[phone] Discharge Orders None A check paul indicates which time of day the medication should be taken. My Medications START taking these medications Instructions Each Dose to Equal  
 Morning Noon Evening Bedtime  
 albuterol-ipratropium 2.5 mg-0.5 mg/3 ml Nebu Commonly known as:  Crystal  Your last dose was: Your next dose is:    
   
   
 3 mL by Nebulization route every six (6) hours as needed. 3 mL ALPRAZolam 0.25 mg tablet Commonly known as:  Nathalia  Your last dose was: Your next dose is: Take 1 Tab by mouth four (4) times daily as needed for Anxiety. Max Daily Amount: 1 mg. 0.25 mg  
    
   
   
   
  
 pantoprazole 40 mg tablet Commonly known as:  PROTONIX Start taking on:  6/9/2018 Your last dose was: Your next dose is: Take 1 Tab by mouth Daily (before breakfast).   
 40 mg  
    
   
   
   
  
  
 CONTINUE taking these medications Instructions Each Dose to Equal  
 Morning Noon Evening Bedtime * TYLENOL EXTRA STRENGTH 500 mg tablet Generic drug:  acetaminophen Your last dose was: Your next dose is: Take 1,000 mg by mouth nightly. 1000 mg  
    
   
   
   
  
 * acetaminophen 500 mg tablet Commonly known as:  TYLENOL Your last dose was: Your next dose is: Take 500 mg by mouth daily. 500 mg  
    
   
   
   
  
 albuterol 90 mcg/actuation inhaler Commonly known as:  PROVENTIL HFA, VENTOLIN HFA, PROAIR HFA Your last dose was: Your next dose is: Take 1 Puff by inhalation every four (4) hours as needed for Wheezing. 1 Puff  
    
   
   
   
  
 amLODIPine 10 mg tablet Commonly known as:  Sarah Beth Croft Your last dose was: Your next dose is: Take 5 mg by mouth daily (after breakfast). 5 mg  
    
   
   
   
  
 aspirin delayed-release 81 mg tablet Your last dose was: Your next dose is: Take 1 tablet by mouth daily. 81 mg  
    
   
   
   
  
 ferrous sulfate 325 mg (65 mg iron) tablet Your last dose was: Your next dose is: TAKE 1 TABLET BY MOUTH DAILY BEFORE BREAKFAST  
     
   
   
   
  
 ketoconazole 2 % topical cream  
Commonly known as:  NIZORAL Your last dose was: Your next dose is:    
   
   
 Apply  to affected area two (2) times daily as needed for Skin Irritation. multivitamin tablet Commonly known as:  ONE A DAY Your last dose was: Your next dose is: Take 1 Tab by mouth daily. 1 Tab * Notice: This list has 2 medication(s) that are the same as other medications prescribed for you. Read the directions carefully, and ask your doctor or other care provider to review them with you. Where to Get Your Medications Information on where to get these meds will be given to you by the nurse or doctor. ! Ask your nurse or doctor about these medications  
  albuterol-ipratropium 2.5 mg-0.5 mg/3 ml Nebu ALPRAZolam 0.25 mg tablet  
 pantoprazole 40 mg tablet Discharge Instructions Nutrition Recommendations for Discharge:          
 
Continue Oral Nutrition Supplements at discharge: Ensure Active High Protein for Muscle Health as needed 
for 30 days unless otherwise directed by your Primary Care Physician. This product can be purchased at your 
 local grocery store or drug store and online. Luis Portillo, [address] [place] [address] 
  
 
 
HOSPITALIST DISCHARGE INSTRUCTIONS 
NAME: Farshad Hernandez :  1932 MRN:  [id number] Date/Time:  2018 12:44 PM 
 
ADMIT DATE: 6/3/2018 DISCHARGE DATE: 2018 ADMITTING DIAGNOSIS: 
Interstitial lung disease Anemia DISCHARGE DIAGNOSIS: 
As above MEDICATIONS: 
 
· It is important that you take the medication exactly as they are prescribed. · Keep your medication in the bottles provided by the pharmacist and keep a list of the medication names, dosages, and times to be taken in your wallet. · Do not take other medications without consulting your doctor. Pain Management: per above medications What to do at Home: After discussions with palliative care family has determined not to pursue aggressive work up or treatment. Once clinically appropriate would advise referral to hospice Recommended diet:  Resume previous diet Recommended activity: Activity as tolerated If you experience any of the following symptoms then please call your primary care physician or return to the emergency room if you cannot get hold of your doctor: 
Fever, chills, nausea, vomiting, diarrhea, change in mentation, falling, bleeding, shortness of breath Follow Up: 
 Dr. Britt Lopez MD  you are to call and set up an appointment to see them in 1 week. Information obtained by : 
I understand that if any problems occur once I am at home I am to contact my physician. I understand and acknowledge receipt of the instructions indicated above. Physician's or R.N.'s Signature                                                                  Date/Time Patient or Representative Signature                                                          Date/Time 
 
 
 
[facility] Transitions of Care [facility] offers a voluntary care coordination program to provide high quality service and care to [facility] fee-for-service beneficiaries. Zheng Lewis was designed to help you enhance your health and well-being through the following services: ? Transitions of Care  support for individuals who are transitioning from one care setting to another (example: Hospital to home). ? Chronic and Complex Care Coordination  support for individuals and caregivers of those with serious or chronic illnesses or with more than one chronic (ongoing) condition and those who take a number of different medications. If you meet specific medical criteria, a [facility] [address] may call you directly to coordinate your care with your primary care physician and your other care providers. For questions about the [facility] programs, please, contact your physicians office. For general questions or additional information about Accountable Care Organizations: 
Please visit www.medicare.gov/acos. html or call 1-800-MEDICARE (9-606.969.5991) TTY users should call 6-499.713.3062. [employer] Announcement We are excited to announce that we are making your provider's discharge notes available to you in [employer]. You will see these notes when they are completed and signed by the physician that discharged you from your recent hospital stay. If you have any questions or concerns about any information you see in [employer], please call the Health Information Department where you were seen or reach out to your Primary Care Provider for more information about your plan of care. Introducing [facility]! Huong Rosenberg introduces [employer] patient portal. Now you can access parts of your medical record, email your doctor's office, and request medication refills online. 1. In your internet browser, go to [email or web address] 2. Click on the First Time User? Click Here link in the Sign In box. You will see the New Member Sign Up page. 3. Enter your [employer] Access Code exactly as it appears below. You will not need to use this code after youve completed the sign-up process. If you do not sign up before the expiration date, you must request a new code. · [employer] Access Code: [email or web address] Expires: 7/19/2018  4:16 PM 
 
4. Enter the last four digits of your Social Security Number (xxxx) and Date of Birth (mm/dd/yyyy) as indicated and click Submit. You will be taken to the next sign-up page. 5. Create a [employer] ID. This will be your [employer] login ID and cannot be changed, so think of one that is secure and easy to remember. 6. Create a [employer] password. You can change your password at any time. 7. Enter your Password Reset Question and Answer. This can be used at a later time if you forget your password. 8. Enter your e-mail address. You will receive e-mail notification when new information is available in [address] 9. Click Sign Up.  You can now view and download portions of your medical record. 10. Click the Download Summary menu link to download a portable copy of your medical information. If you have questions, please visit the Frequently Asked Questions section of the [employer] website. Remember, [employer]) is NOT to be used for urgent needs. For medical emergencies, dial 911. Now available from your iPhone and Android! Introducing Toni Magallanes As a [facility] patient, I wanted to make you aware of our electronic visit tool called Toni Magallanes. [employer] allows you to connect within minutes with a medical provider 24 hours a day, seven days a week via a mobile device or tablet or logging into a secure website from your computer. You can access Toni Magallanes from anywhere in the [place]. A virtual visit might be right for you when you have a simple condition and feel like you just dont want to get out of bed, or cant get away from work for an appointment, when your regular [facility] provider is not available (evenings, weekends or holidays), or when youre out of town and need minor care. Electronic visits cost only $49 and if the [employer] provider determines a prescription is needed to treat your condition, one can be electronically transmitted to a nearby pharmacy*. Please take a moment to enroll today if you have not already done so. The enrollment process is free and takes just a few minutes. To enroll, please download the [employer] yelena to your tablet or phone, or visit [email or web address] to enroll on your computer. And, as an [address] [place] [address] patient with a [employer] account, the results of your visits will be scanned into your electronic medical record and your primary care provider will be able to view the scanned results. We urge you to continue to see your regular [facility] provider for your ongoing medical care.   And while your primary care provider may not be the one available when you seek a Jumpzter[employer]in virtual visit, the peace of mind you get from getting a real diagnosis real time can be priceless. For more information on [employer], view our Frequently Asked Questions (FAQs) at [email or web address]. Sincerely, 
 
Jesse Reid MD 
Chief Medical Officer [employer] *:  certain medications cannot be prescribed via JumpztertitoTriCipher Unresulted Labs-Please follow up with your PCP about these lab tests Order Current Status PTH-RELATED PEPTIDE In process Last Palliative Care Discharge Note   
 06/08/18 1234  Version 1 of 1 The Palliative Medicine team was consulted as part of your / your loved one's care in the hospital. Our team is a supportive service; we strive to relieve suffering and improve quality of life. You identified the following goal(s) as your main focus for healthcare: Patient/Health Care Proxy Stated Goals: Rehabilitation Plan to go to [facility] in an effort to build strength to possible return home; Family considering utilizing hospice services for extra layer of support after rehabilitation. We reviewed advance care planning information, which includes the following: 
Advance Care Planning 6/3/2018 Patient's Healthcare Decision Maker is: Legal Next of Kin Primary Decision Maker Name Paco Barragan Primary Decision Maker Phone Number [phone] Primary Decision Maker Relationship to Patient Adult child Confirm Advance Directive Yes, not on file Patient Would Like to Complete Advance Directive - We reviewed / discussed your code status as: DNR 
   Full Code means perform CPR in the event of cardiac arrest 
   [facility] means do NOT perform CPR in the event of cardiac arrest 
   Partial Code means you have specific preferences, please discuss with your health care team 
   No Order means this issue was not addressed / resolved during your stay Because of the importance of this information, we are providing you with a printed copy to share with other healthcare providers after this hospitalization is complete. If any of the above information is incomplete or incorrect, please contact the Palliative Medicine team at [phone]. Providers Seen During Your Hospitalization Provider Specialty Primary office phone Domingo Charlieor, [address] [facility] Emergency Medicine [phone] Tayo Price MD Hospitalist [phone] Jes Hill DO Internal Medicine [phone] Michelene Goldberg, MD Internal Medicine [phone] Your Primary Care Physician (PCP) Primary Care Physician Office Phone Office Fax Soren Ames [phone] You are allergic to the following Allergen Reactions Codeine Nausea and Vomiting Recent Documentation Height Weight BMI OB Status Smoking Status 1.473 m 43.1 kg 19.86 kg/m2 Postmenopausal Never Smoker Emergency Contacts Name Discharge Info Relation Home Work Mobile [address] [place] [address] CAREGIVER [3] Child [2] [phone] AnnamarieAjith DISCHARGE CAREGIVER [3] Other Relative [6] [phone] Patient Belongings The following personal items are in your possession at time of discharge: 
  Dental Appliances: None  Visual Aid: Glasses, At bedside      Home Medications: None   Jewelry: Earrings  Clothing: Dress, Bathrobe    Other Valuables: None Please provide this summary of care documentation to your next provider. Signatures-by signing, you are acknowledging that this After Visit Summary has been reviewed with you and you have received a copy. Patient Signature:  ____________________________________________________________ Date:  ____________________________________________________________  
  
Rossy Correap  Provider Signature: ____________________________________________________________ Date:  ____________________________________________________________

## 2018-06-03 NOTE — IP AVS SNAPSHOT
Fred Nielsen 
 
 
 [address] [place] [address] 
[phone] Patient: Lianet Man MRN: [id number] NVQ:0/2/6122 A check paul indicates which time of day the medication should be taken. My Medications START taking these medications Instructions Each Dose to Equal  
 Morning Noon Evening Bedtime  
 albuterol-ipratropium 2.5 mg-0.5 mg/3 ml Nebu Commonly known as:  Radha Puga Your last dose was: Your next dose is:    
   
   
 3 mL by Nebulization route every six (6) hours as needed. 3 mL ALPRAZolam 0.25 mg tablet Commonly known as:  Johnice Salts Your last dose was: Your next dose is: Take 1 Tab by mouth four (4) times daily as needed for Anxiety. Max Daily Amount: 1 mg. 0.25 mg  
    
   
   
   
  
 pantoprazole 40 mg tablet Commonly known as:  PROTONIX Start taking on:  6/9/2018 Your last dose was: Your next dose is: Take 1 Tab by mouth Daily (before breakfast). 40 mg CONTINUE taking these medications Instructions Each Dose to Equal  
 Morning Noon Evening Bedtime * TYLENOL EXTRA STRENGTH 500 mg tablet Generic drug:  acetaminophen Your last dose was: Your next dose is: Take 1,000 mg by mouth nightly. 1000 mg  
    
   
   
   
  
 * acetaminophen 500 mg tablet Commonly known as:  TYLENOL Your last dose was: Your next dose is: Take 500 mg by mouth daily. 500 mg  
    
   
   
   
  
 albuterol 90 mcg/actuation inhaler Commonly known as:  PROVENTIL HFA, VENTOLIN HFA, PROAIR HFA Your last dose was: Your next dose is: Take 1 Puff by inhalation every four (4) hours as needed for Wheezing. 1 Puff  
    
   
   
   
  
 amLODIPine 10 mg tablet Commonly known as:  Arnulfo Galeas Your last dose was: Your next dose is: Take 5 mg by mouth daily (after breakfast). 5 mg  
    
   
   
   
  
 aspirin delayed-release 81 mg tablet Your last dose was: Your next dose is: Take 1 tablet by mouth daily. 81 mg  
    
   
   
   
  
 ferrous sulfate 325 mg (65 mg iron) tablet Your last dose was: Your next dose is: TAKE 1 TABLET BY MOUTH DAILY BEFORE BREAKFAST  
     
   
   
   
  
 ketoconazole 2 % topical cream  
Commonly known as:  NIZORAL Your last dose was: Your next dose is:    
   
   
 Apply  to affected area two (2) times daily as needed for Skin Irritation. multivitamin tablet Commonly known as:  ONE A DAY Your last dose was: Your next dose is: Take 1 Tab by mouth daily. 1 Tab * Notice: This list has 2 medication(s) that are the same as other medications prescribed for you. Read the directions carefully, and ask your doctor or other care provider to review them with you. Where to Get Your Medications Information on where to get these meds will be given to you by the nurse or doctor. ! Ask your nurse or doctor about these medications  
  albuterol-ipratropium 2.5 mg-0.5 mg/3 ml Nebu ALPRAZolam 0.25 mg tablet  
 pantoprazole 40 mg tablet

## 2018-06-03 NOTE — ED PROVIDER NOTES
HPI Comments: 80 y.o. female with past medical history significant for HTN, XOL, DM, GERD, depression, RA, CAD s/p PCI, interstitial lung disease, Alzheimer's dementia, who presents ambulatory to the ED with chief complaint of progressively worsening shortness of breath since Thursday, 5/31/2018. History is provided primarily by patient's son due to her history of dementia. Son states that patient's dyspnea seems to be exacerbated with exertion. Pt has also had a non-productive cough and has been fatigued. Pt specifically denies any chest pain, fevers, or leg swelling. History is overall limited due to the pt's baseline dementia. Social hx: Negative for Tobacco use; Negative for EtOH use; Negative for Illicit Drug Abuse    PCP: Julianne Bernal MD    Full history, physical exam, and ROS unable to be obtained due to:  dementia. Note written by Renetta Valencia. Juan David Grier, as dictated by Georgia Pearson, DO 7:04 PM     The history is provided by the patient, a relative and medical records (son). The history is limited by the condition of the patient (baseline dementia). No  was used.         Past Medical History:   Diagnosis Date    CAD (coronary artery disease)     CAD    Dementia in Alzheimer's disease     Depression     DM type 2 causing vascular disease ([facility] [address]     GERD (gastroesophageal reflux disease)     Hx of tuberculosis     as child    Hypercholesterolemia     Hypertension     Osteoporosis, post-menopausal     Rheumatoid arthritis(714.0)        Past Surgical History:   Procedure Laterality Date    HX CATARACT REMOVAL      left with lens implant    HX CORONARY ARTERY BYPASS GRAFT      HX CORONARY STENT PLACEMENT      HX KNEE REPLACEMENT Left     HX MOHS PROCEDURES      right         Family History:   Problem Relation Age of Onset    Colon Cancer Mother     Cancer Father     Heart Disease Child              Social History     Social History    Marital status:      Spouse name: N/A    Number of children: N/A    Years of education: N/A     Occupational History    Not on file. Social History Main Topics    Smoking status: Never Smoker    Smokeless tobacco: Never Used    Alcohol use No    Drug use: No    Sexual activity: Yes     Other Topics Concern    Not on file     Social History Narrative     ALLERGIES: Codeine    Review of Systems   Unable to perform ROS: Dementia       Vitals:    06/03/18 2130 06/03/18 2327 06/03/18 2337 06/04/18 0030   BP: 172/52  148/87 139/66   Pulse: 81 83 85 82   Resp: (!) 32  24 25   Temp:   98.4 °F (36.9 °C)    SpO2: 100%  96% 95%   Weight:       Height:                Physical Exam   Constitutional: She appears well-developed. No distress. Patient is frail and elderly appearing   HENT:   Head: Normocephalic and atraumatic. Right Ear: External ear normal.   Left Ear: External ear normal.   Nose: Nose normal.   Mouth/Throat: Oropharynx is clear and moist. No oropharyngeal exudate. Eyes: Conjunctivae and EOM are normal. Pupils are equal, round, and reactive to light. Right eye exhibits no discharge. Left eye exhibits no discharge. No scleral icterus. Neck: Normal range of motion. Neck supple. No JVD present. No tracheal deviation present. Cardiovascular: Normal rate, regular rhythm, normal heart sounds and intact distal pulses. Exam reveals no gallop and no friction rub. No murmur heard. Pulmonary/Chest: Effort normal. No stridor. Tachypnea (mild) noted. No respiratory distress. She has no decreased breath sounds. She has no wheezes. She has no rhonchi. She has rales in the right lower field. She exhibits no tenderness. Abdominal: Soft. Bowel sounds are normal. She exhibits no distension. There is no tenderness. There is no rebound and no guarding. Genitourinary: Rectal exam shows guaiac negative stool (per lab). Musculoskeletal: Normal range of motion. She exhibits no edema or tenderness.    Neurological: She is alert. She has normal strength and normal reflexes. No cranial nerve deficit or sensory deficit. She exhibits normal muscle tone. Coordination normal. GCS eye subscore is 4. GCS verbal subscore is 5. GCS motor subscore is 6. Mild confusion   Skin: Skin is warm and dry. No rash noted. She is not diaphoretic. No erythema. No pallor. Psychiatric: She has a normal mood and affect. Her behavior is normal. Judgment and thought content normal.   Nursing note and vitals reviewed. Note written by Leidy Gonzales. Natalya Rios, as dictated by Jose Baptiste, DO 7:04 PM      MDM  Number of Diagnoses or Management Options  Acute renal failure, unspecified acute renal failure type [facility]): Anemia, unspecified type:   Elevated brain natriuretic peptide (BNP) level:   Hypoxia:   Interstitial lung disease (HCC):   SOB (shortness of breath):      Amount and/or Complexity of Data Reviewed  Clinical lab tests: ordered and reviewed  Tests in the radiology section of CPT®: ordered and reviewed  Tests in the medicine section of CPT®: ordered and reviewed  Discuss the patient with other providers: yes (Hospitalist)  Independent visualization of images, tracings, or specimens: yes (ekg)    Risk of Complications, Morbidity, and/or Mortality  Presenting problems: moderate  Diagnostic procedures: low  Management options: moderate    Critical Care  Total time providing critical care: 30-74 minutes (Total critical care time spent exclusive of procedures:  40 minutes)    Patient Progress  Patient progress: stable        ED Course       Procedures    Chief Complaint   Patient presents with    Fatigue    Shortness of Breath       The patient's presenting problems have been discussed, and they are in agreement with the care plan formulated and outlined with them. I have encouraged them to ask questions as they arise throughout their visit.     MEDICATIONS GIVEN:  Medications   0.9% sodium chloride infusion 250 mL (not administered)   acetaminophen (TYLENOL) tablet 1,000 mg (not administered)   aspirin delayed-release tablet 81 mg (not administered)   ferrous sulfate tablet 325 mg (not administered)   sodium chloride (NS) flush 5-10 mL (10 mL IntraVENous Given 6/4/18 0008)   sodium chloride (NS) flush 5-10 mL (not administered)   0.9% sodium chloride infusion (75 mL/hr IntraVENous New Bag 6/4/18 0016)   albuterol/ipratropium (DUONEB) neb solution (not administered)   hydrALAZINE (APRESOLINE) 20 mg/mL injection 10 mg (not administered)   0.9% sodium chloride infusion 250 mL (not administered)   pantoprazole (PROTONIX) tablet 40 mg (not administered)       LABS REVIEWED:  Recent Results (from the past 24 hour(s))   CBC WITH AUTOMATED DIFF    Collection Time: 06/03/18  7:23 PM   Result Value Ref Range    WBC 10.9 3.6 - 11.0 K/uL    RBC 2.68 (L) 3.80 - 5.20 M/uL    HGB 6.8 (L) 11.5 - 16.0 g/dL    HCT 22.2 (L) 35.0 - 47.0 %    MCV 82.8 80.0 - 99.0 FL    MCH 25.4 (L) 26.0 - 34.0 PG    MCHC 30.6 30.0 - 36.5 g/dL    RDW 18.2 (H) 11.5 - 14.5 %    PLATELET 263 (H) 645 - 400 K/uL    MPV 8.5 (L) 8.9 - 12.9 FL    NRBC 0.6 (H) 0  WBC    ABSOLUTE NRBC 0.06 (H) 0.00 - 0.01 K/uL    NEUTROPHILS 75 32 - 75 %    LYMPHOCYTES 14 12 - 49 %    MONOCYTES 6 5 - 13 %    EOSINOPHILS 4 0 - 7 %    BASOPHILS 0 0 - 1 %    IMMATURE GRANULOCYTES 1 (H) 0.0 - 0.5 %    ABS. NEUTROPHILS 8.2 (H) 1.8 - 8.0 K/UL    ABS. LYMPHOCYTES 1.5 0.8 - 3.5 K/UL    ABS. MONOCYTES 0.7 0.0 - 1.0 K/UL    ABS. EOSINOPHILS 0.4 0.0 - 0.4 K/UL    ABS. BASOPHILS 0.0 0.0 - 0.1 K/UL    ABS. IMM.  GRANS. 0.1 (H) 0.00 - 0.04 K/UL    DF SMEAR SCANNED      RBC COMMENTS ANISOCYTOSIS  2+        RBC COMMENTS MACROCYTOSIS  1+        RBC COMMENTS MICROCYTOSIS  2+        RBC COMMENTS POIKILOCYTOSIS  1+        RBC COMMENTS TARGET CELLS  PRESENT        RBC COMMENTS ROULEAUX  PRESENT       METABOLIC PANEL, COMPREHENSIVE    Collection Time: 06/03/18  7:23 PM   Result Value Ref Range    Sodium 141 136 - 145 mmol/L    Potassium 4.4 3.5 - 5.1 mmol/L    Chloride 108 97 - 108 mmol/L    CO2 24 21 - 32 mmol/L    Anion gap 9 5 - 15 mmol/L    Glucose 89 65 - 100 mg/dL    BUN 35 (H) 6 - 20 MG/DL    Creatinine 1.18 (H) 0.55 - 1.02 MG/DL    BUN/Creatinine ratio 30 (H) 12 - 20      GFR est AA 53 (L) >60 ml/min/1.73m2    GFR est non-AA 43 (L) >60 ml/min/1.73m2    Calcium 8.7 8.5 - 10.1 MG/DL    Bilirubin, total 0.3 0.2 - 1.0 MG/DL    ALT (SGPT) 15 12 - 78 U/L    AST (SGOT) 28 15 - 37 U/L    Alk.  phosphatase 108 45 - 117 U/L    Protein, total 9.9 (H) 6.4 - 8.2 g/dL    Albumin 1.5 (L) 3.5 - 5.0 g/dL    Globulin 8.4 (H) 2.0 - 4.0 g/dL    A-G Ratio 0.2 (L) 1.1 - 2.2     TROPONIN I    Collection Time: 06/03/18  7:23 PM   Result Value Ref Range    Troponin-I, Qt. 0.04 <0.05 ng/mL   NT-PRO BNP    Collection Time: 06/03/18  7:23 PM   Result Value Ref Range    NT pro-BNP 4179 (H) 0 - 450 PG/ML   EKG, 12 LEAD, INITIAL    Collection Time: 06/03/18  7:23 PM   Result Value Ref Range    Ventricular Rate 89 BPM    Atrial Rate 89 BPM    P-R Interval 278 ms    QRS Duration 80 ms    Q-T Interval 354 ms    QTC Calculation (Bezet) 430 ms    Calculated P Axis 69 degrees    Calculated R Axis -11 degrees    Calculated T Axis 88 degrees    Diagnosis       Sinus rhythm with 1st degree AV block  Possible Left atrial enlargement  Possible Anterior infarct , age undetermined  Abnormal ECG  When compared with ECG of 17-FEB-2017 15:53,  premature atrial complexes are no longer present  Nonspecific T wave abnormality, worse in Lateral leads     OCCULT BLOOD, STOOL    Collection Time: 06/03/18  7:51 PM   Result Value Ref Range    Occult blood, stool NEGATIVE  NEG     TYPE & SCREEN    Collection Time: 06/03/18  8:00 PM   Result Value Ref Range    Crossmatch Expiration 06/06/2018     ABO/Rh(D) O NEGATIVE     Antibody screen PENDING     Comment Previously identified Anti D    CBC W/O DIFF    Collection Time: 06/04/18 12:25 AM   Result Value Ref Range    WBC 9.0 3.6 - 11.0 K/uL    RBC 2.57 (L) 3.80 - 5.20 M/uL    HGB 6.4 (L) 11.5 - 16.0 g/dL    HCT 21.2 (L) 35.0 - 47.0 %    MCV 82.5 80.0 - 99.0 FL    MCH 24.9 (L) 26.0 - 34.0 PG    MCHC 30.2 30.0 - 36.5 g/dL    RDW 18.4 (H) 11.5 - 14.5 %    PLATELET 142 (H) 586 - 400 K/uL    MPV 8.4 (L) 8.9 - 12.9 FL    NRBC 0.7 (H) 0  WBC    ABSOLUTE NRBC 0.06 (H) 0.00 - 0.01 K/uL       VITAL SIGNS:  Patient Vitals for the past 12 hrs:   Temp Pulse Resp BP SpO2   06/04/18 0030 - 82 25 139/66 95 %   06/03/18 2337 98.4 °F (36.9 °C) 85 24 148/87 96 %   06/03/18 2327 - 83 - - -   06/03/18 2130 - 81 (!) 32 172/52 100 %   06/03/18 2115 - 82 (!) 33 185/52 98 %   06/03/18 2100 - 78 24 (!) 203/68 98 %   06/03/18 2045 - 82 21 (!) 202/70 97 %   06/03/18 2030 - 85 30 183/76 99 %   06/03/18 2015 - 81 22 (!) 182/96 99 %   06/03/18 2000 - 85 (!) 31 (!) 168/137 99 %   06/03/18 1945 - 90 (!) 36 (!) 169/112 98 %   06/03/18 1930 - - - - 98 %   06/03/18 1930 - 87 23 154/62 99 %   06/03/18 1835 99 °F (37.2 °C) 92 17 145/70 100 %       RADIOLOGY RESULTS:  The following have been ordered and reviewed:  Xr Chest Pa Lat    Result Date: 6/3/2018  INDICATION: SOB EXAM: CXR 2 Views. COMPARISON: 12/14/2016. FINDINGS: Frontal and lateral views of the chest show interval progression of bilateral lower lobe predominant pulmonary fibrosis, with difficulty excluding superimposed acute infiltrates. Upper lungs remain clear. Heart size is top normal. There is no pulmonary edema, pneumothorax, midline shift or sizable pleural effusion. IMPRESSION: Progression of pulmonary fibrosis, difficult to exclude superimposed pneumonia. ED EKG interpretation:  Rhythm: normal sinus rhythm and 1st degree block; and regular . Rate (approx.): 89; Axis: normal; P wave: normal; QRS interval: normal ; ST/T wave: normal; Other findings: abnormal ekg, LAE, anterior infarct. This EKG was interpreted by Corey Pichardo DO, ED Provider.     CONSULTATIONS:   8:51 PM Caren Haas DO spoke with Dr. Ilya Castro, Consult for Hospitalist.  Discussed available diagnostic tests and clinical findings. Dr. Ilya Castro will evaluate the patient for admission to the hospital.     PROGRESS NOTES:  Discussed results and plan with patient. Patient will be admitted/observed for further evaluation and treatment. DIAGNOSIS:    1. SOB (shortness of breath)    2. Hypoxia    3. Anemia, unspecified type    4. Acute renal failure, unspecified acute renal failure type (HCC)    5. Elevated brain natriuretic peptide (BNP) level    6. Interstitial lung disease ([facility] [address]        PLAN:  Admit to Hospitalist    ED COURSE: The patient's hospital course has been uncomplicated.

## 2018-06-03 NOTE — ED TRIAGE NOTES
PT lives with family who brings her in for SOB and pain. Suffers of RA. Since Thursday progressively getting worse with breathing. Family describe DALEY.

## 2018-06-03 NOTE — Clinical Note
Status[de-identified] Inpatient [101] Type of Bed: Telemetry [19] Inpatient Hospitalization Certified Necessary for the Following Reasons: 3. Patient receiving treatment that can only be provided in an inpatient setting (further clarification in H&P documentation) Admitting Diagnosis: Hypoxia [638786] Admitting Physician: Brittni Bazzi Attending Physician: Brittni Bazzi Estimated Length of Stay: 3-4 Midnights Discharge Plan[de-identified] Home with Office Follow-up

## 2018-06-04 NOTE — PROGRESS NOTES
[facility]: MED RECONCILIATION    Comments/Recommendations:     Medication(s) ADDED to PTA list:  1. None    Medication(s) REMOVED from PTA list:  1. Cholecalciferol 2000 units daily    Medication(s) ADJUSTED on PTA list:  1. Acetaminophen dose changed to 500 mg QAM + 1000 mg QPM (from 1000 mg daily as needed)      Information obtained from: Family/ Pharmacy fills    Allergies: Codeine    Prior to Admission Medications:     Prior to Admission Medications   Prescriptions Last Dose Informant Patient Reported? Taking?   acetaminophen (TYLENOL EXTRA STRENGTH) 500 mg tablet 6/2/2018 at pm  Yes Yes   Sig: Take 1,000 mg by mouth nightly. acetaminophen (TYLENOL) 500 mg tablet 6/3/2018 at am Child Yes Yes   Sig: Take 500 mg by mouth daily. albuterol (PROVENTIL HFA, VENTOLIN HFA, PROAIR HFA) 90 mcg/actuation inhaler   No Yes   Sig: Take 1 Puff by inhalation every four (4) hours as needed for Wheezing. amLODIPine (NORVASC) 10 mg tablet 6/3/2018 at am  Yes Yes   Sig: Take 5 mg by mouth daily (after breakfast). aspirin delayed-release 81 mg tablet 6/3/2018 at Unknown time Child No Yes   Sig: Take 1 tablet by mouth daily. ferrous sulfate 325 mg (65 mg iron) tablet 6/3/2018 at Unknown time  No Yes   Sig: TAKE 1 TABLET BY MOUTH DAILY BEFORE BREAKFAST   ketoconazole (NIZORAL) 2 % topical cream   No Yes   Sig: Apply  to affected area two (2) times daily as needed for Skin Irritation. multivitamin (ONE A DAY) tablet 6/3/2018 at Unknown time Child Yes Yes   Sig: Take 1 Tab by mouth daily.       Facility-Administered Medications: None                Eufemia Campoverde, PHARMD   Contact: [phone]

## 2018-06-04 NOTE — PROGRESS NOTES
Shift Summary  2230: Patient arrived to room, family at bedside. Consent for blood put in patient chart. Awaiting blood bank to receive sample. 0045: Blood bank called saying that [facility] is attempting to crossmatch blood, which has multiple antibodies. Asking if patient has had a recent transfusion. No recent transfusion in patient hx in chart. Patient states that she has not had a transfusion lately, however due to memory loss not entirely reliable report. 0219: Albert Boland from blood bank called. [facility] was unable to crossmatch and find appropriate unit of blood for patient due to numerous antibodies coming positive. Blood bank inquiring if patient is receiving any immunotherapy, responded that patient does not have any hx of this in her chart or PTA meds. Blood bank had me send down three more crossmatch samples to be sent to the [facility], who would have to try to find compatible units. 8645: patient has AVB1, this AM has been frequently dropping beats. At around [id number], appears to have gone into AVB 2, type 1. Will continue to monitor. No cardio consult currently, will talk to hospitalist in AM.         0700: have not heard back from blood bank in regards to patient crossmatched units. Will pass on to MD and fred RN    0730: Bedside and Verbal shift change report given to Isaias Turner RN (oncoming nurse) by Marvel Ramos RN (offgoing nurse). Report included the following information SBAR, Kardex, ED Summary, Intake/Output, MAR, Accordion, Recent Results and Cardiac Rhythm 1 degree AVB, dropping beats. .     Care Plan Summary  Problem: Pressure Injury - Risk of  Goal: *Prevention of pressure injury  Document Taz Scale and appropriate interventions in the flowsheet.    Outcome: Progressing Towards Goal  Pressure Injury Interventions:       Moisture Interventions: Absorbent underpads    Activity Interventions: Increase time out of bed, PT/OT evaluation    Mobility Interventions: PT/OT evaluation, Turn and reposition approx. every two hours(pillow and wedges), Float heels    Nutrition Interventions: Document food/fluid/supplement intake    Friction and Shear Interventions: Feet elevated on foot rest, Minimize layers         Problem: Falls - Risk of  Goal: *Absence of Falls  Document Macarena Fall Risk and appropriate interventions in the flowsheet.   Outcome: Progressing Towards Goal  Fall Risk Interventions:  Mobility Interventions: Bed/chair exit alarm, Assess mobility with egress test, Utilize walker, cane, or other assitive device, PT Consult for mobility concerns, Patient to call before getting OOB    Mentation Interventions: Bed/chair exit alarm, Reorient patient, More frequent rounding, Door open when patient unattended, Toileting rounds, Room close to nurse's station    Medication Interventions: Teach patient to arise slowly, Evaluate medications/consider consulting pharmacy, Patient to call before getting OOB, Bed/chair exit alarm    Elimination Interventions: Call light in reach, Toileting schedule/hourly rounds, Patient to call for help with toileting needs

## 2018-06-04 NOTE — PROGRESS NOTES
Shift Summary    0700  Bedside and Verbal shift change report given to Kostas Babin RN (oncoming nurse) by Molly Bai RN (offgoing nurse). Report included the following information SBAR, Kardex, MAR, Recent Results and Cardiac Rhythm  . MD notified of issues with blood. New blood samples sent to lab last night to be sent out to [employer] related to issues with cross match. Patient also had some dropped beats overnight. Patient appears to be in NSR with 1st degree AVB at this time. MD notified. No new orders at this time. Asprin held. MD notified. 7028  Patient down to [address] with son and daughter in law regarding plan for today. Would like to have ensure with meals since patient does not have a big appetite at baseline. No new concerns at this time. 1015  Patient returned from [address] notified regarding cardiac rhythm changes. Instance of 2nd degree type II noted with heart rate in the 30's. New orders to consult cardiology. Hematology and cardiology consulted. Echo completed. Son at bedside. Updated on plan of care. 1500  Bedside and Verbal shift change report given to [employer] (oncoming nurse) by Kostas Babin RN (offgoing nurse). Report included the following information SBAR, Kardex, MAR, Recent Results and Cardiac Rhythm  .

## 2018-06-04 NOTE — CONSULTS
[facility] [place] at [facility]  [address] [facility], [address] [facility] [address] [facility]  Jeb a: [phone]  F: [phone]      Reason for Visit:   Ronak Servin is a 80 y.o. female who is seen in consultation at the request of Dr. Margarita Cochran for evaluation of symptomatic anemia. History of Present Illness:   Ms. Milo West is an 79 y/o female with Rheumatoid arthritis (RA), HTN, CAD, DMII, dementia who was admitted with nonproductive cough, SOB, fatigue and dark stools. She was taking oral iron supplements for several months, but she recently switched to multivitamin due to GI intolerance of the iron. Her son reports that patient has had dark brown stools, but no black stools and no red blood in stools. Hemoccult in ED negative. He states she has not had any prior blood transfusions as far as he knows. She is not on any treatment aside from Tylenol for the RA. She denies any abdominal pain, CP. No recent fevers, chills, sweats. She has had unintentional weight loss of 75-lbs over the past 2-3 yrs. ED labs notable for Hgb 6.8, . Chest CT showed progression of pulmonary fibrosis as well as interstitial edema.      Past Medical History:   Diagnosis Date    CAD (coronary artery disease)     CAD    Dementia in Alzheimer's disease     Depression     DM type 2 causing vascular disease ([facility] [address]     GERD (gastroesophageal reflux disease)     Hx of tuberculosis     as child    Hypercholesterolemia     Hypertension     Osteoporosis, post-menopausal     Rheumatoid arthritis(714.0)       Past Surgical History:   Procedure Laterality Date    HX CATARACT REMOVAL      left with lens implant    HX CORONARY ARTERY BYPASS GRAFT      HX CORONARY STENT PLACEMENT      HX KNEE REPLACEMENT Left     HX MOHS PROCEDURES      right      Social History   Substance Use Topics    Smoking status: Never Smoker    Smokeless tobacco: Never Used    Alcohol use No      Family History   Problem Relation Age of Onset    Colon Cancer Mother     Cancer Father     Heart Disease Child          Mother had pancreatic cancer per patient's son. Current Facility-Administered Medications   Medication Dose Route Frequency    [START ON 6/5/2018] amLODIPine (NORVASC) tablet 5 mg  5 mg Oral DAILY AFTER BREAKFAST    [START ON 6/5/2018] therapeutic multivitamin (THERAGRAN) tablet 1 Tab  1 Tab Oral DAILY    acetaminophen (TYLENOL) tablet 1,000 mg  1,000 mg Oral QHS    albuterol-ipratropium (DUO-NEB) 2.5 MG-0.5 MG/3 ML  3 mL Nebulization Q4HWA RT    0.9% sodium chloride infusion 250 mL  250 mL IntraVENous PRN    0.9% sodium chloride infusion 250 mL  250 mL IntraVENous PRN    acetaminophen (TYLENOL) tablet 1,000 mg  1,000 mg Oral DAILY PRN    ferrous sulfate tablet 325 mg  1 Tab Oral DAILY WITH BREAKFAST    sodium chloride (NS) flush 5-10 mL  5-10 mL IntraVENous Q8H    sodium chloride (NS) flush 5-10 mL  5-10 mL IntraVENous PRN    0.9% sodium chloride infusion  75 mL/hr IntraVENous CONTINUOUS    hydrALAZINE (APRESOLINE) 20 mg/mL injection 10 mg  10 mg IntraVENous Q2H PRN    0.9% sodium chloride infusion 250 mL  250 mL IntraVENous PRN    pantoprazole (PROTONIX) tablet 40 mg  40 mg Oral ACB      Allergies   Allergen Reactions    Codeine Nausea and Vomiting        Review of Systems: A complete review of systems was obtained, negative except as described above. Physical Exam:     Visit Vitals    /60    Pulse (!) 36  Comment: not sustained - nurse notified    Temp 98 °F (36.7 °C)    Resp 22    Ht 4' 10\" (1.473 m)    Wt 43.1 kg (95 lb)    SpO2 100%    BMI 19.86 kg/m2     ECOG PS: 2-3  General: No distress, cachectic  Eyes: PERRLA, anicteric sclerae  HENT: Atraumatic with normal appearance of ears and nose; OP clear  Neck: Supple; no thyromegaly   Lymphatic: No cervical, supraclavicular or inguinal LAD. 1 small soft mobile palpable LN in left axilla.   Respiratory: CTAB, normal respiratory effort  CV: Normal rate, regular rhythm, no murmurs, no peripheral edema  GI: Soft, nontender, nondistended, no masses, no hepatomegaly, no splenomegaly  MS: Normal gait and station. Digits without clubbing or cyanosis. Few bony nodules on wrists. Skin: No rashes, ecchymoses, or petechiae. Normal temperature, turgor, and texture. Neuro/Psych: Alert, oriented, appropriate affect, decreased memory judgement/insight. Results:     Lab Results   Component Value Date/Time    WBC 9.0 06/04/2018 12:25 AM    HGB 6.4 (L) 06/04/2018 12:25 AM    HCT 21.2 (L) 06/04/2018 12:25 AM    PLATELET 344 (H) 00/74/0191 12:25 AM    MCV 82.5 06/04/2018 12:25 AM    ABS. NEUTROPHILS 8.2 (H) 06/03/2018 07:23 PM    Hemoglobin (POC) 12.2 05/27/2010 04:07 PM    Hematocrit (POC) 36 05/27/2010 04:07 PM     Lab Results   Component Value Date/Time    Sodium 139 06/04/2018 12:25 AM    Potassium 4.2 06/04/2018 12:25 AM    Chloride 108 06/04/2018 12:25 AM    CO2 24 06/04/2018 12:25 AM    Glucose 89 06/04/2018 12:25 AM    BUN 32 (H) 06/04/2018 12:25 AM    Creatinine 1.17 (H) 06/04/2018 12:25 AM    GFR est AA 53 (L) 06/04/2018 12:25 AM    GFR est non-AA 44 (L) 06/04/2018 12:25 AM    Calcium 10.7 (H) 06/04/2018 12:25 AM    Sodium (POC) 139 05/27/2010 04:07 PM    Potassium (POC) 4.1 05/27/2010 04:07 PM    Chloride (POC) 109 (H) 05/27/2010 04:07 PM    Glucose (POC) 88 12/16/2016 04:21 PM    BUN (POC) 30 (H) 05/27/2010 04:07 PM    Creatinine (POC) 1.1 05/27/2010 04:07 PM    Calcium, ionized (POC) 1.17 05/27/2010 04:07 PM     Lab Results   Component Value Date/Time    Bilirubin, total 0.3 06/03/2018 07:23 PM    ALT (SGPT) 15 06/03/2018 07:23 PM    AST (SGOT) 28 06/03/2018 07:23 PM    Alk.  phosphatase 108 06/03/2018 07:23 PM    Protein, total 9.9 (H) 06/03/2018 07:23 PM    Albumin 1.5 (L) 06/03/2018 07:23 PM    Globulin 8.4 (H) 06/03/2018 07:23 PM     Lab Results   Component Value Date/Time    Reticulocyte count 1.4 12/15/2016 05:10 AM    Iron % saturation 20 06/03/2018 11:56 PM    TIBC 120 (L) 06/03/2018 11:56 PM    Ferritin 336 (H) 04/20/2018 04:27 PM    Vitamin B12 1324 (H) 06/03/2018 11:56 PM    Folate 56.7 (H) 06/03/2018 11:56 PM    KAY Poly POS 06/03/2018 08:00 PM     12/15/2016 05:10 AM    Sed rate (ESR) 75 (H) 08/13/2015 02:23 PM    C-Reactive protein 10.70 (H) 08/26/2012 06:50 AM    C-Reactive Protein, Qt 249.5 (H) 08/13/2015 02:23 PM    TSH 1.78 12/15/2016 05:10 AM    Lipase 174 09/04/2015 12:15 PM    Hep C Virus Ab <0.1 03/15/2012 10:43 AM     Lab Results   Component Value Date/Time    INR 1.1 02/26/2012 11:55 PM    aPTT 30.1 02/26/2012 11:55 PM     Lab Results   Component Value Date/Time     12/15/2016 05:10 AM     6/3/2018 XR CHEST  IMPRESSION: Progression of pulmonary fibrosis, difficult to exclude superimposed pneumonia. 6/3/2018 CT CHEST WO CONT  IMPRESSION:  1. Progressive IPF/UIP. The lower lobes are largely replaced with fibrosis and honeycombing, and there is extension into the right middle lobe and lingula. 2. Superimposed CHF: Interstitial edema, trace pericardial and pleural effusions, and cardiomegaly. 3. Mildly enlarged left axillary lymph nodes. Probable enlarged mediastinal lymph nodes. These may be reactive in the setting of CHF. Assessment and Recommendations:   81 y/o with h/o of CAD, dementia, RA admitted with fatigue, cough associated with SOB. 1. Normocytic anemia: Hemoccult negative in ED. Iron profile shows iron sat 20% which is an improvement from prior iron sat 10% in 4/2018. With KAY positive, I am concerned for autoimmune hemolytic anemia and I will check labs for hemolysis. GI consulted has evaluated patient and recommends endoscopic eval once pulmonary status improved. -- Retic, LDH, haptoglobin  -- SPEP, free light chains  -- Recommend 1 Unit of PRBCs when available (least incompatible unit should be transfused). 2. Interstitial lung disease: Progression seen on CT.  Pulmonary evaluating.  -- On nebulizers and supplemental O2    3. Hypercalcemia: 10.7 this am; corrected Ca 12.7.   -- Repeating CMP, check PTH  -- Adding SPEP, free light chains    4. CAD s/p stent / Pulmonary edema / Bradycardia with 2nd degree AV block: Cardiology following. Agree that the mediastinal LNs are likely reactive. -- ECHO pending    5. Dementia: Continue supportive care    Patient seen in conjunction with Vivian Grey NP.     Signed By: Tati Martinez MD

## 2018-06-04 NOTE — ED NOTES
ADMISSION TO INPATIENT UNIT FROM ED REPORT:    Verbal report given to [facility], RN (name) on this patient  being transferred to [facility] for routine progression of care       Report consisted of patients Situation, Background, Assessment and   Recommendations(SBAR), MAR, Recent Results, Vital Signs, and plan of care reviewed with receiving RN. Pt is alert and oriented x 4, with no s/s of distress, Respiratory Status is stable , Cardiac is stable  Patient is hemodynamically stable. Receiving RN aware that Patient is in need of blood transfusion upon admission to the floor. Pt is aware of plan of care. Pt family is at bedside. Safety/fall precautions in place. Pt denies needs at this time. Opportunity for questions and clarification was provided. Patient transported with:   Monitor  O2 @ 2 liters  Registered Nurse    Patient Vitals for the past 4 hrs:   Temp Pulse Resp BP SpO2   06/03/18 2130 - 81 (!) 32 172/52 100 %   06/03/18 2115 - 82 (!) 33 185/52 98 %   06/03/18 2100 - 78 24 (!) 203/68 98 %   06/03/18 2045 - 82 21 (!) 202/70 97 %   06/03/18 2030 - 85 30 183/76 99 %   06/03/18 2015 - 81 22 (!) 182/96 99 %   06/03/18 2000 - 85 (!) 31 (!) 168/137 99 %   06/03/18 1945 - 90 (!) 36 (!) 169/112 98 %   06/03/18 1930 - - - - 98 %   06/03/18 1930 - 87 23 154/62 99 %   06/03/18 1835 99 °F (37.2 °C) 92 17 145/70 100 %         Labs Reviewed   CBC WITH AUTOMATED DIFF - Abnormal; Notable for the following:        Result Value    RBC 2.68 (*)     HGB 6.8 (*)     HCT 22.2 (*)     MCH 25.4 (*)     RDW 18.2 (*)     PLATELET 389 (*)     MPV 8.5 (*)     NRBC 0.6 (*)     ABSOLUTE NRBC 0.06 (*)     IMMATURE GRANULOCYTES 1 (*)     ABS. NEUTROPHILS 8.2 (*)     ABS. IMM.  GRANS. 0.1 (*)     All other components within normal limits   METABOLIC PANEL, COMPREHENSIVE - Abnormal; Notable for the following:     BUN 35 (*)     Creatinine 1.18 (*)     BUN/Creatinine ratio 30 (*)     GFR est AA 53 (*)     GFR est non-AA 43 (*) Protein, total 9.9 (*)     Albumin 1.5 (*)     Globulin 8.4 (*)     A-G Ratio 0.2 (*)     All other components within normal limits   NT-PRO BNP - Abnormal; Notable for the following:     NT pro-BNP 4179 (*)     All other components within normal limits   URINE CULTURE HOLD SAMPLE   TROPONIN I   SAMPLES BEING HELD   OCCULT BLOOD, STOOL   URINALYSIS W/MICROSCOPIC   TYPE & SCREEN   TYPE + CROSSMATCH

## 2018-06-04 NOTE — CONSULTS
Cardiology Consultation Note          [address], [place], [address] [facility]         Phone [phone]; Fax [phone]            6/3/2018  6:37 PM  Tryone Valdivia MD  :  1932   MRN:  [id number]     CC: fatigue/SOB/cough  Reason for consult: 2nd degree AV block  Admission Diagnosis: Hypoxia  Pulmonary fibrosis ([facility] [address]    ASSESSMENT/RECOMMENDATIONS:   1)2nd degree AV block type I/bradycardia: reviewing strips appears to mobitz type 1.  - asymptomatic per patient  -avoid AV radha blocking agents  -echo pending  -TSH in AM  - at this point no indication for pacemaker, will continue to monitor. CAD: has been on ASA, currently on hold d.t anemia    Hypertension: liable, better today. Avoid AV radha blockers    Anemia: Hgb 6.4/21.2. 1 unit PRBCs ordered    ILD: pulmonary fibrosis, suspicious for PNA. CT of chest today    Dementia     RA             Ronak Servin is a 80 y.o. female I am seeing for 2nd degree AV block type II. Pmhx includes 1st degree AV block, CAD, normocytic anemia, ILD, hypertension, RA and dementia in Alzheimer's disease. She was seen for bradycardia and 2nd degree AV Block type I/non conducted [facility] 2016 and no indication for ppm at this time since episodes seemed to be related to N/V. The patient does not know why she is here. She denies lightheadedness, dizziness, SOB, syncope and no chest pain. She lives with her son. Patient is a poor historian d.t dementia. I called her son, no answer.      Allergies   Allergen Reactions    Codeine Nausea and Vomiting         Past Medical History:   Diagnosis Date    CAD (coronary artery disease)     CAD    Dementia in Alzheimer's disease     Depression     DM type 2 causing vascular disease ([facility] [address]     GERD (gastroesophageal reflux disease)     Hx of tuberculosis     as child    Hypercholesterolemia     Hypertension     Osteoporosis, post-menopausal     Rheumatoid arthritis(714.0)         Past Surgical History: Procedure Laterality Date    HX CATARACT REMOVAL      left with lens implant    HX CORONARY ARTERY BYPASS GRAFT      HX CORONARY STENT PLACEMENT      HX KNEE REPLACEMENT Left     HX MOHS PROCEDURES      right        . Home Medications:  Prior to Admission Medications   Prescriptions Last Dose Informant Patient Reported? Taking?   acetaminophen (TYLENOL EXTRA STRENGTH) 500 mg tablet 6/2/2018 at pm  Yes Yes   Sig: Take 1,000 mg by mouth nightly. acetaminophen (TYLENOL) 500 mg tablet 6/3/2018 at am Child Yes Yes   Sig: Take 500 mg by mouth daily. albuterol (PROVENTIL HFA, VENTOLIN HFA, PROAIR HFA) 90 mcg/actuation inhaler   No Yes   Sig: Take 1 Puff by inhalation every four (4) hours as needed for Wheezing. amLODIPine (NORVASC) 10 mg tablet 6/3/2018 at am  Yes Yes   Sig: Take 5 mg by mouth daily (after breakfast). aspirin delayed-release 81 mg tablet 6/3/2018 at Unknown time Child No Yes   Sig: Take 1 tablet by mouth daily. ferrous sulfate 325 mg (65 mg iron) tablet 6/3/2018 at Unknown time  No Yes   Sig: TAKE 1 TABLET BY MOUTH DAILY BEFORE BREAKFAST   ketoconazole (NIZORAL) 2 % topical cream   No Yes   Sig: Apply  to affected area two (2) times daily as needed for Skin Irritation. multivitamin (ONE A DAY) tablet 6/3/2018 at Unknown time Child Yes Yes   Sig: Take 1 Tab by mouth daily.       Facility-Administered Medications: None       Hospital Medications:  Current Facility-Administered Medications   Medication Dose Route Frequency    [START ON 6/5/2018] amLODIPine (NORVASC) tablet 5 mg  5 mg Oral DAILY AFTER BREAKFAST    [START ON 6/5/2018] therapeutic multivitamin (THERAGRAN) tablet 1 Tab  1 Tab Oral DAILY    acetaminophen (TYLENOL) tablet 1,000 mg  1,000 mg Oral QHS    albuterol-ipratropium (DUO-NEB) 2.5 MG-0.5 MG/3 ML  3 mL Nebulization Q4HWA RT    0.9% sodium chloride infusion 250 mL  250 mL IntraVENous PRN    0.9% sodium chloride infusion 250 mL  250 mL IntraVENous PRN    acetaminophen (TYLENOL) tablet 1,000 mg  1,000 mg Oral DAILY PRN    ferrous sulfate tablet 325 mg  1 Tab Oral DAILY WITH BREAKFAST    sodium chloride (NS) flush 5-10 mL  5-10 mL IntraVENous Q8H    sodium chloride (NS) flush 5-10 mL  5-10 mL IntraVENous PRN    0.9% sodium chloride infusion  75 mL/hr IntraVENous CONTINUOUS    hydrALAZINE (APRESOLINE) 20 mg/mL injection 10 mg  10 mg IntraVENous Q2H PRN    0.9% sodium chloride infusion 250 mL  250 mL IntraVENous PRN    pantoprazole (PROTONIX) tablet 40 mg  40 mg Oral ACB          OBJECTIVE       Laboratory and Imaging have been reviewed and are notable for      ECG: NSR with 1st AV block     Telemetry: NSR with long 1st degree AV block, intermittent episodes of mobitz type I & Type II or non conducted PAC, rate 40-50's. Diagnostic Tests:     Recent Labs      06/03/18   1923   TROIQ  0.04     Recent Labs      06/04/18   0025  06/03/18 1923   NA  139  141   K  4.2  4.4   CO2  24  24   BUN  32*  35*   CREA  1.17*  1.18*   GLU  89  89   WBC  9.0  10.9   HGB  6.4*  6.8*   HCT  21.2*  22.2*   PLT  620*  643*         Cardiac work up to date:  No specialty comments available. Social History:  Social History   Substance Use Topics    Smoking status: Never Smoker    Smokeless tobacco: Never Used    Alcohol use No       Family History:  Family History   Problem Relation Age of Onset    Colon Cancer Mother     Cancer Father     Heart Disease Child              Review of Symptoms:  A comprehensive review of systems was negative except for that written in the HPI. Physical Exam:      Visit Vitals    /60    Pulse (!) 36  Comment: not sustained - nurse notified    Temp 98 °F (36.7 °C)    Resp 22    Ht 4' 10\" (1.473 m)    Wt 95 lb (43.1 kg)    SpO2 100%    BMI 19.86 kg/m2     General Appearance:  Well developed, thin/frail,alert and oriented x 2, and individual in no acute distress.    EENT/Mouth:   Hearing grossly normal.Normal oral mucosa,no scleral icterus     Neck: Supple no JVD    Chest:   Crackles LLL>RLL   Cardiovascular:  Regular rate and rhythm,  2/6  Murmur at apex, no rubs or gallop. Abdomen:   Soft, non-tender, bowel sounds are active. Extremities: No edema bilaterally. Pulses detected, no varicosities   Skin: Warm and dry. No bruising  Neuro  Moves all extermities and neurologically intact                                                       I have discussed the diagnosis with the patient and the intended plan as seen in the above orders. Questions were answered concerning future plans. I have discussed medication side effects and warnings with the patient as well. Serina Steele is in agreement to the plan listed above and wishes to proceed. Thank you for this consult.       Karuna Slaughter NP

## 2018-06-04 NOTE — PROGRESS NOTES
Rodolfo Dill [facility]  [address] [facility], [address] [place] [address]  [phone]      Medical Progress Note      NAME: Cheri Olson   :  1932  MRM:  [id number]    Date/Time: 2018  12:56 PM       Assessment and Plan:     Symptomatic normocytic anemia of unclear etiology / melena. Stool for occult blood in ER is negative. Appreciate GI input, I suspect that her respiratory issues are as stable as they can be with ILD. Awaiting blood for transfusion, multiple anti-bodies and somewhat abnormal manual diff, will ask Hematology to see patient.      Interstitial lung disease ([facility] [address] (2015)/ Hypoxia (6/3/2018)/ chronic respiratory failure. Appreciate pulmonary input. Continue O2 supplement to keep SAO2 > 90%, duo neb. Will likely need home oxygen but will see after able to get Hgb up.    ?2nd degree AV block, type 2. Noted by telemetry. Currently exhibiting only 1st degree AVB, which traditionally doesn't evolving into a high-degree AV blockade. Could be PACs within 1st degree. Check echo. Cardiology consult to assist     Hypertension. Not well controlled. Continue amlodipine and hydralazine PRN. May need to increase amlodipine or add another BP med       Rheumatoid arthritis ([facility] [address] (2012). Continue pain meds      Generalized anxiety disorder (3/14/2012). Not on meds      CAD (coronary artery disease) (3/26/2012). On ASA but holding this while Hgb is low     GERD (gastroesophageal reflux disease). 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 Supportive care      MIKHAIL (acute kidney injury). Avoid nephrotic drugs. Continue IVF and monitor              Subjective:     Chief Complaint:  Patient seen and examined. Chart reviewed. Patient denies SOB at present. Denies any fever or chills    ROS:  (bold if positive, if negative)      Tolerating PT  Tolerating Diet        Objective:     Last 24hrs VS reviewed since prior progress note.  Most recent are:    Visit Vitals  /60    Pulse (!) 36    Temp 98 °F (36.7 °C)    Resp 22    Ht 4' 10\" (1.473 m)    Wt 43.1 kg (95 lb)    SpO2 100%    BMI 19.86 kg/m2     SpO2 Readings from Last 6 Encounters:   06/04/18 100%   04/20/18 99%   10/27/17 97%   02/22/17 99%   02/17/17 95%   02/15/17 97%    O2 Flow Rate (L/min): 3 l/min     Intake/Output Summary (Last 24 hours) at 06/04/18 1256  Last data filed at 06/04/18 0745   Gross per 24 hour   Intake              630 ml   Output              250 ml   Net              380 ml        Physical Exam:    Gen:  Well-developed, well-nourished, in no acute distress  HEENT:  Pink conjunctivae, PERRL, hearing intact to voice, moist mucous membranes  Neck:  Supple, without masses, thyroid non-tender  Resp:  No accessory muscle use, fine crackles bilaterally  Card:  No murmurs, normal S1, S2 without thrills, bruits or peripheral edema  Abd:  Soft, non-tender, non-distended, normoactive bowel sounds are present, no palpable organomegaly and no detectable hernias  Lymph:  No cervical or inguinal adenopathy  Musc:  No cyanosis or clubbing  Skin:  No rashes or ulcers, skin turgor is good  Neuro:  Cranial nerves are grossly intact, no focal motor weakness, follows commands appropriately  Psych:  Good insight, oriented to person, place and time, alert    Telemetry reviewed:   1st degree AV block at present  __________________________________________________________________  Medications Reviewed: (see below)  Medications:     Current Facility-Administered Medications   Medication Dose Route Frequency    albuterol-ipratropium (DUO-NEB) 2.5 MG-0.5 MG/3 ML  3 mL Nebulization Q4HWA RT    0.9% sodium chloride infusion 250 mL  250 mL IntraVENous PRN    0.9% sodium chloride infusion 250 mL  250 mL IntraVENous PRN    acetaminophen (TYLENOL) tablet 1,000 mg  1,000 mg Oral DAILY PRN    ferrous sulfate tablet 325 mg  1 Tab Oral DAILY WITH BREAKFAST    sodium chloride (NS) flush 5-10 mL  5-10 mL IntraVENous Q8H    sodium chloride (NS) flush 5-10 mL  5-10 mL IntraVENous PRN    0.9% sodium chloride infusion  75 mL/hr IntraVENous CONTINUOUS    hydrALAZINE (APRESOLINE) 20 mg/mL injection 10 mg  10 mg IntraVENous Q2H PRN    0.9% sodium chloride infusion 250 mL  250 mL IntraVENous PRN    pantoprazole (PROTONIX) tablet 40 mg  40 mg Oral ACB        Lab Data Reviewed: (see below)  Lab Review:     Recent Labs      06/04/18   0025  06/03/18 1923   WBC  9.0  10.9   HGB  6.4*  6.8*   HCT  21.2*  22.2*   PLT  620*  643*     Recent Labs      06/04/18   0025  06/03/18 1923   NA  139  141   K  4.2  4.4   CL  108  108   CO2  24  24   GLU  89  89   BUN  32*  35*   CREA  1.17*  1.18*   CA  10.7*  8.7   ALB   --   1.5*   TBILI   --   0.3   SGOT   --   28   ALT   --   15     Lab Results   Component Value Date/Time    Glucose (POC) 88 12/16/2016 04:21 PM    Glucose (POC) 106 (H) 12/16/2016 02:05 PM    Glucose (POC) 79 12/16/2016 11:52 AM    Glucose (POC) 82 12/16/2016 07:43 AM    Glucose (POC) 89 12/15/2016 08:49 PM     No results for input(s): PH, PCO2, PO2, HCO3, FIO2 in the last 72 hours. No results for input(s): INR in the last 72 hours. No lab exists for component: INREXT  All Micro Results     Procedure Component Value Units Date/Time    URINE CULTURE HOLD SAMPLE [[id number]] Collected:  06/04/18 0146    Order Status:  Completed Specimen:  Urine from Serum Updated:  06/04/18 0159     Urine culture hold         URINE ON HOLD IN MICROBIOLOGY DEPT FOR 3 DAYS. IF UNPRESERVED URINE IS SUBMITTED, IT CANNOT BE USED FOR ADDITIONAL TESTING AFTER 24 HRS, RECOLLECTION WILL BE REQUIRED. I have reviewed notes of prior 24hr.     Other pertinent lab: None    Total time spent with patient: [address] discussed with: Patient, Family, Nursing Staff, Consultant/Specialist and >50% of time spent in counseling and coordination of care    Discussed:  Care Plan    Prophylaxis:  SCD's    Disposition:   PT, OT, RN           ___________________________________________________    Attending Physician: Flori Matthew DO

## 2018-06-04 NOTE — H&P
[facility]  [address] [facility], [place] [address]  [phone]    Admission History and Physical      NAME:  Leeanne Johnson   :   1932   MRN:  [id number]     PCP:  Rahat Jerez MD     Date/Time:  6/3/2018         Subjective:     CHIEF COMPLAINT: fatigue, SOB, cough      HISTORY OF PRESENT ILLNESS:     Ms. Daylin Puentes is a 80 y.o.  female who is admitted with symptomatic anemia. Ms. Daylin Puentes with PMH PF, CAD, dementia, depression, hyperlipidemia, RA presented to ER c/o fatigue, melena, cough and SOB, which started about 4 days ago and became progressively worse. The cough is nonproductive. Denies fever. Pt has been very fatigued and SOB recently. Pt noted to have melena, but stool for occult blood is negative in ER. Pt takes iron. Past Medical History:   Diagnosis Date    CAD (coronary artery disease)     CAD    Dementia in Alzheimer's disease     Depression     DM type 2 causing vascular disease ([facility] [address]     GERD (gastroesophageal reflux disease)     Hx of tuberculosis     as child    Hypercholesterolemia     Hypertension     Osteoporosis, post-menopausal     Rheumatoid arthritis(714.0)         Past Surgical History:   Procedure Laterality Date    HX CATARACT REMOVAL      left with lens implant    HX CORONARY ARTERY BYPASS GRAFT      HX CORONARY STENT PLACEMENT      HX KNEE REPLACEMENT Left     HX MOHS PROCEDURES      right       Social History   Substance Use Topics    Smoking status: Never Smoker    Smokeless tobacco: Never Used    Alcohol use No        Family History   Problem Relation Age of Onset    Colon Cancer Mother     Cancer Father     Heart Disease Child               Allergies   Allergen Reactions    Codeine Nausea and Vomiting        Prior to Admission medications    Medication Sig Start Date End Date Taking?  Authorizing Provider   albuterol (PROVENTIL HFA, VENTOLIN HFA, PROAIR HFA) 90 mcg/actuation inhaler Take 1 Puff by inhalation every four (4) hours as needed for Wheezing. 4/20/18   Yazmin Gates MD   amLODIPine (NORVASC) 5 mg tablet Take 1 Tab by mouth daily. 4/20/18   Yazmin Gates MD   ketoconazole (NIZORAL) 2 % topical cream Apply  to affected area two (2) times daily as needed for Skin Irritation. 4/20/18   Yazmin Gates MD   ferrous sulfate 325 mg (65 mg iron) tablet TAKE 1 TABLET BY MOUTH DAILY BEFORE BREAKFAST 11/24/17   Emilia Woodard NP   multivitamin (ONE A DAY) tablet Take 1 Tab by mouth daily. Historical Provider   aspirin delayed-release 81 mg tablet Take 1 tablet by mouth daily. 11/28/14   Sincere Dawson MD   cholecalciferol (VITAMIN D3) 1,000 unit tablet Take 2,000 Units by mouth daily. Historical Provider   acetaminophen (TYLENOL) 500 mg tablet Take 1,000 mg by mouth daily as needed.     Historical Provider         Review of Systems:  Unable to provide Hx due to dementia             Objective:      VITALS:    Vital signs reviewed; most recent are:    Visit Vitals    /52    Pulse 81    Temp 99 °F (37.2 °C)    Resp (!) 32    Ht 4' 10\" (1.473 m)    Wt 38.1 kg (84 lb)    SpO2 100%    BMI 17.56 kg/m2     SpO2 Readings from Last 6 Encounters:   06/03/18 100%   04/20/18 99%   10/27/17 97%   02/22/17 99%   02/17/17 95%   02/15/17 97%    O2 Flow Rate (L/min): 2 l/min   No intake or output data in the 24 hours ending 06/03/18 7327         Exam:     Physical Exam:    Gen:  Well-developed, well-nourished, in no acute distress  HEENT:  Pink conjunctivae, PERRL, hearing intact to voice, moist mucous membranes  Neck:  Supple, without masses, thyroid non-tender  Resp:  No accessory muscle use, clear breath sounds without wheezes rales or rhonchi  Card:  No murmurs, normal S1, S2 without thrills, bruits or peripheral edema  Abd:  Soft, non-tender, non-distended, normoactive bowel sounds are present, no palpable organomegaly and no detectable hernias  Lymph:  No cervical or inguinal adenopathy  Musc:  No cyanosis or clubbing  Skin:  No rashes or ulcers, skin turgor is good  Neuro:  Cranial nerves are grossly intact, no focal motor weakness, follows commands appropriately  Psych:  poor insight,         Labs:    Recent Labs      06/03/18 1923   WBC  10.9   HGB  6.8*   HCT  22.2*   PLT  643*     Recent Labs      06/03/18 1923   NA  141   K  4.4   CL  108   CO2  24   GLU  89   BUN  35*   CREA  1.18*   CA  8.7   ALB  1.5*   TBILI  0.3   SGOT  28   ALT  15     Lab Results   Component Value Date/Time    Glucose (POC) 88 12/16/2016 04:21 PM    Glucose (POC) 106 (H) 12/16/2016 02:05 PM     No results for input(s): PH, PCO2, PO2, HCO3, FIO2 in the last 72 hours. No results for input(s): INR in the last 72 hours. No lab exists for component: INREXT    Telemetry reviewed:   first degree AV block        Assessment/Plan:    1. Symptomatic anemia (6/3/2018)/ melena. Check iron studies and transfuse two unit of PRBC. Stool for occult blood in ER is negative. Consult GI.     2.   Interstitial lung disease ([facility] [address] (4/20/2015)/ Hypoxia (6/3/2018)/ chronic respiratory failure. Family stated that she has an appointment to see a pulmonologist. Continue O2 supplement to keep SAO2 > 90%, duo neb. Consult pulmonary    3. Hypertension. Not well controlled. Continue amlodipine and hydralazine PRN. May need to increase amlodipine or add another BP med      4. Rheumatoid arthritis ([facility] [address] (2/27/2012). Continue pain meds     5. Generalized anxiety disorder (3/14/2012). Not on meds     6. CAD (coronary artery disease) (3/26/2012). On ASA     7. GERD (gastroesophageal reflux disease). Start PPI    8. Dementia in Alzheimer's disease. Supportive care     9. MIKHAIL (acute kidney injury) ([facility] [address] (6/3/2018). Avoid nephrotic drugs.  Continue IVF and check urine lytes        Code status: DNR               Previous medical records reviewed     Risk of deterioration: high      Total time spent with patient: 79 Minutes Care Plan discussed with: Patient, Family, Nursing Staff and >50% of time spent in counseling and coordination of care    Discussed:  Care Plan    Prophylaxis:    SCD's    Probable Disposition:  Home w/Family           ___________________________________________________    Attending Physician: Gilford Saunders, MD

## 2018-06-04 NOTE — CDMP QUERY
Thank you for documenting the diagnosis of anemia. Based on your medical judgment, if known, please specify the type of anemia for this patient.    => Iron Deficiency Anemia  => Iron Deficiency Anemia secondary to blood loss  => Acute blood loss anemia  => Other explanation of clinical findings   => Clinically Undetermined (no explanation for clinical findings)    The medical record reflects the following clinical findings, treatment, and risk factors. 79 yo female PMH RA, GERD, DM, dementia. Home meds include Iron Sulfate 325 mg daily & MVI 1 tab daily. Labs this admission show B12 1324 pg/mL, Folate 56.7 ng/mL, Iron 24 ug/dL, TIBC 120 ug/dL, Iron sats 20%. Hemoccult negative. Treated with total transfusion of 3 units PRBCs and continued daily Iron Sulfate. Please clarify and document your clinical opinion in the progress notes and discharge summary including the definitive and/or presumptive diagnosis, (suspected or probable), related to the above clinical findings. Please include clinical findings supporting your diagnosis.     Margoth LuaGeisinger [facility], [address] [facility]  [email or web address]

## 2018-06-04 NOTE — PROGRESS NOTES
Reason for Admission:  Hypoxia, Pulmonary Fibrosis              RRAT Score:  22                Resources/supports as identified by patient/family:   Supportive family                 Top Challenges facing patient (as identified by patient/family and CM): Finances/Medication cost?      No issues has prescription coverage under her insurance plan               Transportation? Family               Support system or lack thereof? Living arrangements? Lives with her son Yessica Beltrán [phone]            Self-care/ADLs/Cognition? Requires assistance with ADLs          Current Advanced Directive/Advance Care Plan:                            Plan for utilizing home health:    Not opposed if recommended                       Likelihood of readmission: green/low                 Transition of Care Plan:       Pt lives with her son Yessica Beltrán [phone]. Pt has prescription coverage under her insurance plan, she gets her prescriptions filled at [facility] on [address]. Pt's PCP is Dr. Darinel Rosa. NN notified of hospital admission. Pt has never had home health before. DME - pt has a walker and a wheel chair. No other issues or concerns at this time. [facility] Care Management Interventions  PCP Verified by CM:  Yes  MyChart Signup: No  Discharge Durable Medical Equipment: No  Physical Therapy Consult: No  Occupational Therapy Consult: No  Speech Therapy Consult: No  Current Support Network: Relative's Home  Confirm Follow Up Transport: Family  Plan discussed with Pt/Family/Caregiver: Yes  Discharge Location  Discharge Placement: Home

## 2018-06-04 NOTE — PROGRESS NOTES
1600- Bedside report received patient resting in bed son at bedside no acute concerns, labs have been drawn, still awaiting blood products. ~ G. Mt RN     6613  Bedside and Verbal shift change report given to Quincy Lezama RN  (oncoming nurse) by Sea Amor RN  (offgoing nurse). Report included the following information SBAR, Kardex and Med Rec Status.

## 2018-06-04 NOTE — CONSULTS
[address] [place] [address]. [address] [place] [address] NP  [phone]                    GASTROENTEROLOGY CONSULTATION NOTE              NAME:  Serina Steele   :   1932   MRN:   [id number]       Referring Physician:    Jenni Herndon Date:   2018 10:25 AM    Chief Complaint:    Anemia     History of Present Illness:    Patient is a 80 y.o. female who we were asked to see in consultation for the above complaint. Of note, this H and P was gathered from the patient who has dementia and is a poor historian. The patient was noted to have hemoblogin in the 6 range since her admission here. She states she has never had gi bleeding in the past.  She has had a colonoscopy but it was a long time ago and she does not recall the results. She denies black or bloody stools. She does not take anticoagulants, uses NSAIDs occasionally. She does not drink alcohol, or smoke cigarettes. She has no complaints of heartburn, reflux, or dysphagia. She denies feeling faint and lightheaded but endorses feeling fatigued. PMH:  Past Medical History:   Diagnosis Date    CAD (coronary artery disease)     CAD    Dementia in Alzheimer's disease     Depression     DM type 2 causing vascular disease ([facility] [address]     GERD (gastroesophageal reflux disease)     Hx of tuberculosis     as child    Hypercholesterolemia     Hypertension     Osteoporosis, post-menopausal     Rheumatoid arthritis(714.0)        PSH:  Past Surgical History:   Procedure Laterality Date    HX CATARACT REMOVAL      left with lens implant    HX CORONARY ARTERY BYPASS GRAFT      HX CORONARY STENT PLACEMENT      HX KNEE REPLACEMENT Left     HX MOHS PROCEDURES      right       Allergies: Allergies   Allergen Reactions    Codeine Nausea and Vomiting       Home Medications:  Prior to Admission Medications   Prescriptions Last Dose Informant Patient Reported?  Taking?   acetaminophen (TYLENOL EXTRA STRENGTH) 500 mg tablet 2018 at pm  Yes Yes   Sig: Take 1,000 mg by mouth nightly. acetaminophen (TYLENOL) 500 mg tablet 6/3/2018 at am Child Yes Yes   Sig: Take 500 mg by mouth daily. albuterol (PROVENTIL HFA, VENTOLIN HFA, PROAIR HFA) 90 mcg/actuation inhaler   No Yes   Sig: Take 1 Puff by inhalation every four (4) hours as needed for Wheezing. amLODIPine (NORVASC) 10 mg tablet 6/3/2018 at am  Yes Yes   Sig: Take 5 mg by mouth daily (after breakfast). aspirin delayed-release 81 mg tablet 6/3/2018 at Unknown time Child No Yes   Sig: Take 1 tablet by mouth daily. ferrous sulfate 325 mg (65 mg iron) tablet 6/3/2018 at Unknown time  No Yes   Sig: TAKE 1 TABLET BY MOUTH DAILY BEFORE BREAKFAST   ketoconazole (NIZORAL) 2 % topical cream   No Yes   Sig: Apply  to affected area two (2) times daily as needed for Skin Irritation. multivitamin (ONE A DAY) tablet 6/3/2018 at Unknown time Child Yes Yes   Sig: Take 1 Tab by mouth daily.       Facility-Administered Medications: None       Hospital Medications:  Current Facility-Administered Medications   Medication Dose Route Frequency    albuterol-ipratropium (DUO-NEB) 2.5 MG-0.5 MG/3 ML  3 mL Nebulization Q4HWA RT    0.9% sodium chloride infusion 250 mL  250 mL IntraVENous PRN    0.9% sodium chloride infusion 250 mL  250 mL IntraVENous PRN    acetaminophen (TYLENOL) tablet 1,000 mg  1,000 mg Oral DAILY PRN    aspirin delayed-release tablet 81 mg  81 mg Oral DAILY    ferrous sulfate tablet 325 mg  1 Tab Oral DAILY WITH BREAKFAST    sodium chloride (NS) flush 5-10 mL  5-10 mL IntraVENous Q8H    sodium chloride (NS) flush 5-10 mL  5-10 mL IntraVENous PRN    0.9% sodium chloride infusion  75 mL/hr IntraVENous CONTINUOUS    hydrALAZINE (APRESOLINE) 20 mg/mL injection 10 mg  10 mg IntraVENous Q2H PRN    0.9% sodium chloride infusion 250 mL  250 mL IntraVENous PRN    pantoprazole (PROTONIX) tablet 40 mg  40 mg Oral ACB       Social History:  Social History   Substance Use Topics    Smoking status: Never Smoker  Smokeless tobacco: Never Used    Alcohol use No       Family History:  Family History   Problem Relation Age of Onset    Colon Cancer Mother     Cancer Father     Heart Disease Child              Review of Systems:  Constitutional: negative fever, negative chills, negative weight loss  Eyes:   negative visual changes  ENT:   negative sore throat, tongue or lip swelling  Respiratory:  negative cough, positive dyspnea  Cards:  negative for chest pain, palpitations, lower extremity edema  GI:   See HPI  :  negative for frequency, dysuria  Integument:  negative for rash and pruritus  Heme:  negative for easy bruising and gum/nose bleeding  Musculoskel: negative for myalgias,  back pain and muscle weakness  Neuro: negative for headaches, dizziness, vertigo  Psych:  negative for feelings of anxiety, depression     Objective:   Patient Vitals for the past 8 hrs:   BP Temp Pulse Resp SpO2   06/04/18 0745 - - 72 - -   06/04/18 0700 - - 80 - -   06/04/18 0658 - - (!) 45 - -   06/04/18 0608 156/84 - 83 26 93 %   06/04/18 0500 148/65 98.3 °F (36.8 °C) 85 24 98 %   06/04/18 0409 151/53 - - - -     06/04 0701 - 06/04 1900  In: 43.8 [I.V.:43.8]  Out: -   06/02 1901 - 06/04 0700  In: 466.3 [I.V.:466.3]  Out: 250 [Urine:250]    EXAM:     NEURO-alert, oriented x2, affect appropriate, no focal neurological deficits   HEENT-Head: Normocephalic, no lesions, without obvious abnormality. LUNGS-Coarse breath sounds with inspiratory and expiratory wheezes bilaterally    COR-regular rate and rhythym     ABD- soft, non-tender.  Bowel sounds normal. No masses,  no organomegaly     EXT-no edema    Skin - No rash     Data Review     Recent Labs      06/04/18   0025  06/03/18 1923   WBC  9.0  10.9   HGB  6.4*  6.8*   HCT  21.2*  22.2*   PLT  620*  643*     Recent Labs      06/04/18   0025  06/03/18 1923   NA  139  141   K  4.2  4.4   CL  108  108   CO2  24  24   BUN  32*  35*   CREA  1.17*  1.18*   GLU  89  89   CA  10.7*  8.7 Recent Labs      06/03/18 1923   SGOT  28   AP  108   TP  9.9*   ALB  1.5*   GLOB  8.4*     No results for input(s): INR, PTP, APTT in the last 72 hours. 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 It was 8.1 from a previous reading on 4/20/18 this year. Her FOBT is negative. - Trend Hgb and Transfuse to goal of 7.  - Continue PPI.  - Diet as tolerated. - No NSAIDs.  - She will need endoscopic evaluation but the risk of sedating her with active lung disease is too high. Will reassess once her pulmonary status is optimized. Will continue to follow. Thank you for allowing me to participate in the care of this patient.   Signed By: Shannan Starkey NP     6/4/2018  10:25 AM

## 2018-06-04 NOTE — PROGRESS NOTES
NUTRITION    RECOMMENDATIONS:     1. Standing Weight to assess for any weight changes  2. Assist with meal ordering  3. Encourage po intake at meals and with Ensure    ASSESSMENT:   6/4: Consult received for general nutrition management and supplements, MST referral due to weight loss and eating poorly, as well as low BMI.,   Admitted with fatigue, SOB, cough. GI following for GI Bleed. Visited pt who is a poor historian, attempted to call son, unable to contact. Pt's appearance with loss of fat/muscle tissue, unsure of when - wt history as below. Current weight of 84 lbs is estimated, attempted bed weight- 95 lbs, requested standing weight for accuracy. Pt states she lives with her son and daughter in law and they cook dinner, someone comes in during the day to help at other meals. She likes Ensure, encouraged her to eat well at meals and to drink Ensure for additional nutrients. Pt verbalized understanding. Will follow, attempt to obtain more history from family,.     Weight Metrics 6/4/2018 4/20/2018 10/27/2017 2/22/2017 2/17/2017 2/15/2017 12/15/2016   Weight 95 lb 90 lb 6.4 oz 89 lb 3.2 oz 95 lb 93 lb 93 lb 6.4 oz 98 lb   BMI 19.86 kg/m2 18.89 kg/m2 18.64 kg/m2 19.86 kg/m2 19.44 kg/m2 18.24 kg/m2 -         Past Medical History:   Diagnosis Date    CAD (coronary artery disease)     CAD    Dementia in Alzheimer's disease     Depression     DM type 2 causing vascular disease (HCC)     GERD (gastroesophageal reflux disease)     Hx of tuberculosis     as child    Hypercholesterolemia     Hypertension     Osteoporosis, post-menopausal     Rheumatoid arthritis(714.0)        Diet: Regular, Ensure High Protein Q meal  Patient Vitals for the past 100 hrs:   % Diet Eaten   06/04/18 0745 50 %         Abd:  wdl          BM: 6/3    Skin Integrity: [x]Intact  []Other  Edema: [x]None []Other    Nutritionally Significant Medications: [x] Reviewed & Includes: NS @ 75 ml/hr, ferrous sulfate    Labs:    Lab Results   Component Value Date/Time    Sodium 139 06/04/2018 12:25 AM    Potassium 4.2 06/04/2018 12:25 AM    Chloride 108 06/04/2018 12:25 AM    CO2 24 06/04/2018 12:25 AM    Anion gap 7 06/04/2018 12:25 AM    Glucose 89 06/04/2018 12:25 AM    BUN 32 (H) 06/04/2018 12:25 AM    Creatinine 1.17 (H) 06/04/2018 12:25 AM    Calcium 10.7 (H) 06/04/2018 12:25 AM    Magnesium 1.7 12/16/2016 04:29 AM    Phosphorus 2.5 (L) 09/05/2015 02:00 AM    Albumin 1.5 (L) 06/03/2018 07:23 PM       Anthropometrics:   Weight Source: Bed (unsure if bed zeroed)  Height: 4' 10\" (147.3 cm),    Body mass index is 19.86 kg/(m^2). IBW : 43.5 kg (96 lb), % IBW (Calculated): 98.96 %, Usual Body Weight:  (UTO),    Wt Readings from Last 5 Encounters:   06/04/18 43.1 kg (95 lb)   04/20/18 41 kg (90 lb 6.4 oz)   10/27/17 40.5 kg (89 lb 3.2 oz)   02/22/17 43.1 kg (95 lb)   02/17/17 42.2 kg (93 lb)       Estimated Daily Nutrition Requirements:   Weight Used: Other (comment) (bed weight, ? accuracy)  Kcals: 1240 Kcals/day Based on:[place] [address] [place] (x 1.3 + 250)  Protein: 47 g ( to 57 gms, 1.1 -1.2 gms/kg)   Fluid: 1240 ml   Carbohydrate:   At least 130 gms/day      Education & Discharge Needs:   [] Pt discussed in ID rounds     Nutrition related discharge needs addressed:     [x] Supplements (on d/c instruction &/or coupons provided)    [] Tube Feedings     [] Education    []No nutrition related discharge needs at this time     Cultural, Mormonism and ethnic food preferences identified    [x] None   [] Yes     NUTRITION DIAGNOSIS:     Inadequate oral intake related to poor appetite  as evidenced by documented, pt with poor appetite, Ensur ordered                     Pt is at Nutrition Risk:  [x]    No Nutrition Risk Identified:  []    RD INTERVENTION / PT GOALS:     Food/Nutrient Delivery:   , Supplements: Commercial supplement (Ensure High Protein ordered Q meal),  ,  ,    Nutrition Education: ,    Nutrition Counseling:    Coordination of Care: Goal: Pt will consume 65% or more at meals and Ensure within 2-4 days    MONITORING & EVALUATION:   Food/Nutrient Intake Outcomes:  Total energy intake, Liquid meal replacement          Previous Nutrition Goals:  Previous Goal Met: N/A  Previous Recommendations:      Previous Recommendations Implemented: N/A       Klaus Hill RD

## 2018-06-04 NOTE — ED NOTES
Pt resting quietly with eyes closed and lights dimmed. Family remains at bedside. No acute distress noted.

## 2018-06-04 NOTE — PROGRESS NOTES
2140: TRANSFER - IN REPORT:    Verbal report received from [facility], ED RN(name) on Sea [place]  being received from ED(unit) for routine progression of care      Report consisted of patients Situation, Background, Assessment and   Recommendations(SBAR). Information from the following report(s) SBAR, Kardex and Procedure Summary was reviewed with the receiving nurse. Opportunity for questions and clarification was provided. Assessment completed upon patients arrival to unit and care assumed.

## 2018-06-04 NOTE — CONSULTS
Name: [facility]: [address]   : 1932 Admit Date: 6/3/2018   Phone: [phone]  Room: 336/01   PCP: Sincere Dawson MD  MRN: [id number]   Date: 2018  Code: DNR          Chart and notes reviewed. Data reviewed. I review the patient's current medications in the medical record at each encounter. I have evaluated and examined the patient. HPI:    9:11 AM       History was obtained from patient and chart. I was asked by Lora Alonso MD to see Lianet Man in consultation for a chief complaint of pulmonary fibrosis. Ms. Dionne Watkins is a pleasant 80year old female admitted with symptomatic anemia. Patient is a poor historian and no family is at the bedside. She is unsure why she was brought to the hospital.  Denies any complaints currently other that hand/joint pain from known RA. Denies known history of lung disease and does not remember having prior chest imaging or being told she has ILD. PMH in the chart notes childhood history of TB. She denies use of home O2 or nebs/inhalers, but prn albuterol inhaler listed in PTA meds. Denies fever, chills, or cough. Denies SOB. HPI at admission reports four days of fatigue, melena, nonproductive cough and SOB. She is on oral iron at baseline. Denies weight loss, sweats, abd pain, or LE pain/swelling. CXR is personally visualized and compared to prior images from Dec 2016. There is interval progression of bilateral lower lobe predominant pulmonary fibrosis, with difficulty excluding superimposed acute infiltrates. Upper lungs remain clear. 3/2015 chest CT personally visualized. There is chronic basilar predominant interstitial lung disease with  bronchiectasis and honeycombing, consistent with pulmonary fibrosis. These  findings have progressed since the prior study in . No pulmonary nodule or mass  is seen. There are no pleural effusions.     WBC 9.0  Hgb 6.4 (6.8 at admission)  Plt 620  Creat 1.17  proBNP 4179  Trop 0.04  Hemoccult negative at admission      Past Medical History:   Diagnosis Date    CAD (coronary artery disease)     CAD    Dementia in Alzheimer's disease     Depression     DM type 2 causing vascular disease ([address]     GERD (gastroesophageal reflux disease)     Hx of tuberculosis     as child    Hypercholesterolemia     Hypertension     Osteoporosis, post-menopausal     Rheumatoid arthritis(714.0)        Past Surgical History:   Procedure Laterality Date    HX CATARACT REMOVAL      left with lens implant    HX CORONARY ARTERY BYPASS GRAFT      HX CORONARY STENT PLACEMENT      HX KNEE REPLACEMENT Left     HX MOHS PROCEDURES      right       Family History   Problem Relation Age of Onset    Colon Cancer Mother     Cancer Father     Heart Disease Child              Social History   Substance Use Topics    Smoking status: Never Smoker    Smokeless tobacco: Never Used    Alcohol use No       Allergies   Allergen Reactions    Codeine Nausea and Vomiting       Current Facility-Administered Medications   Medication Dose Route Frequency    albuterol-ipratropium (DUO-NEB) 2.5 MG-0.5 MG/3 ML  3 mL Nebulization Q4HWA RT    0.9% sodium chloride infusion 250 mL  250 mL IntraVENous PRN    0.9% sodium chloride infusion 250 mL  250 mL IntraVENous PRN    acetaminophen (TYLENOL) tablet 1,000 mg  1,000 mg Oral DAILY PRN    aspirin delayed-release tablet 81 mg  81 mg Oral DAILY    ferrous sulfate tablet 325 mg  1 Tab Oral DAILY WITH BREAKFAST    sodium chloride (NS) flush 5-10 mL  5-10 mL IntraVENous Q8H    sodium chloride (NS) flush 5-10 mL  5-10 mL IntraVENous PRN    0.9% sodium chloride infusion  75 mL/hr IntraVENous CONTINUOUS    hydrALAZINE (APRESOLINE) 20 mg/mL injection 10 mg  10 mg IntraVENous Q2H PRN    0.9% sodium chloride infusion 250 mL  250 mL IntraVENous PRN    pantoprazole (PROTONIX) tablet 40 mg  40 mg Oral ACB         REVIEW OF SYSTEMS   12 point ROS negative except as stated in the HPI. Physical Exam:   Visit Vitals    /84    Pulse 72    Temp 98.3 °F (36.8 °C)    Resp 26    Ht 4' 10\" (1.473 m)    Wt 38.1 kg (84 lb)    SpO2 93%    BMI 17.56 kg/m2       General:  Alert, cooperative, thin/frail, poor historian, no distress, appears stated age. Head:  Normocephalic, without obvious abnormality, atraumatic. Eyes:  Conjunctivae/corneas clear. Nose: Nares normal. Septum midline. Mucosa normal.    Throat: Lips, mucosa, and tongue normal.    Neck: Supple, symmetrical, trachea midline, no adenopathy. Lungs: Inspiratory crackles in the lower lung fields. Upper lung fields mostly clear. No wheeze or rhonchi appreciated. Resp no labored. Chest wall:  No tenderness or deformity. Heart:  Regular rate and rhythm, S1, S2 normal, no murmur, click, rub or gallop. Abdomen:   Soft, non-tender. Bowel sounds normal. No masses,  No organomegaly. Extremities: Extremities normal, atraumatic, no cyanosis or edema. Hand with arthritis and joint deformity. + muscle wasting. Pulses: 2+ and symmetric all extremities.    Skin: Skin color, texture, turgor normal. No rashes or lesions   Lymph nodes: Cervical, supraclavicular nodes normal.   Neurologic: Grossly nonfocal, poor insight,       Lab Results   Component Value Date/Time    Sodium 139 06/04/2018 12:25 AM    Potassium 4.2 06/04/2018 12:25 AM    Chloride 108 06/04/2018 12:25 AM    CO2 24 06/04/2018 12:25 AM    BUN 32 (H) 06/04/2018 12:25 AM    Creatinine 1.17 (H) 06/04/2018 12:25 AM    Glucose 89 06/04/2018 12:25 AM    Calcium 10.7 (H) 06/04/2018 12:25 AM    Magnesium 1.7 12/16/2016 04:29 AM    Phosphorus 2.5 (L) 09/05/2015 02:00 AM    Lactic acid 1.3 11/13/2012 11:05 AM       Lab Results   Component Value Date/Time    WBC 9.0 06/04/2018 12:25 AM    HGB 6.4 (L) 06/04/2018 12:25 AM    PLATELET 117 (H) 20/66/0131 12:25 AM    MCV 82.5 06/04/2018 12:25 AM       Lab Results   Component Value Date/Time    INR 1.1 02/26/2012 11:55 PM    aPTT 30.1 02/26/2012 11:55 PM    AST (SGOT) 28 06/03/2018 07:23 PM    Alk.  phosphatase 108 06/03/2018 07:23 PM    Protein, total 9.9 (H) 06/03/2018 07:23 PM    Albumin 1.5 (L) 06/03/2018 07:23 PM    Globulin 8.4 (H) 06/03/2018 07:23 PM       Lab Results   Component Value Date/Time    Iron 24 (L) 06/03/2018 11:56 PM    TIBC 120 (L) 06/03/2018 11:56 PM    Iron % saturation 20 06/03/2018 11:56 PM    Ferritin 336 (H) 04/20/2018 04:27 PM       Lab Results   Component Value Date/Time    Sed rate (ESR) 75 (H) 08/13/2015 02:23 PM    C-Reactive protein 10.70 (H) 08/26/2012 06:50 AM    TSH 1.78 12/15/2016 05:10 AM        No results found for: PH, PHI, PCO2, PCO2I, PO2, PO2I, HCO3, HCO3I, FIO2, FIO2I    Lab Results   Component Value Date/Time    CK 58 09/04/2015 12:15 PM    CK-MB Index CANNOT BE CALCULATED 09/04/2015 12:15 PM    Troponin-I, Qt. 0.04 06/03/2018 07:23 PM    BNP 49 03/29/2015 03:50 PM        Lab Results   Component Value Date/Time    Culture result: MIXED UROGENITAL LEILANI ISOLATED 03/01/2016 02:29 AM    Culture result: MIXED SKIN LEILANI ISOLATED 07/03/2013 07:44 PM    Culture result: NO GROWTH 5 DAYS 11/13/2012 09:35 AM    Culture result: NO GROWTH 6 DAYS 11/13/2012 09:35 AM       No results found for: TOXA1, RPR, HBCM, HBSAG, HAAB, HCAB1, HAAT, G6PD, CRYAC, HIVGT, HIVR, HIV1, HIV12, HIVPC, HIVRPI    Lab Results   Component Value Date/Time    CK 58 09/04/2015 12:15 PM    CK 51 11/13/2012 09:30 AM       Lab Results   Component Value Date/Time    Color YELLOW/STRAW 06/04/2018 01:46 AM    Appearance CLOUDY (A) 06/04/2018 01:46 AM    Specific gravity 1.025 03/01/2016 02:29 AM    pH (UA) 5.5 06/04/2018 01:46 AM    Protein TRACE (A) 06/04/2018 01:46 AM    Glucose NEGATIVE  06/04/2018 01:46 AM    Ketone TRACE (A) 06/04/2018 01:46 AM    Bilirubin NEGATIVE  06/04/2018 01:46 AM    Blood NEGATIVE  06/04/2018 01:46 AM    Urobilinogen 0.2 06/04/2018 01:46 AM    Nitrites NEGATIVE  06/04/2018 01:46 AM Leukocyte Esterase TRACE (A) 06/04/2018 01:46 AM    WBC 0-4 06/04/2018 01:46 AM    RBC 0-5 06/04/2018 01:46 AM    Epithelial cells 0-10 11/02/2012 09:00 AM    Bacteria NEGATIVE  06/04/2018 01:46 AM       IMPRESSION  · Abnormal chest imaging consistent with progressive ILD/fibrosis  · Anemia  · HTN  · RA  · GERD  · CAD  · Anxiety    PLAN  · O2 if needed to keep sats > 90%; not on home O2 at baseline  · Check non contrast high resolution chest CT  · Continue Duonebs  · GI has been consulted; received 2 units PRBCs at admission and another unit has been ordered this morning. · BP control per primary team with amlodipine and prn hydralazine. · GI prophylaxis: Protonix  · DVT prophylaxis: SCDs      Thank you for allowing us to participate in the care of this patient.       Rusty Torres

## 2018-06-05 NOTE — PROGRESS NOTES
[facility]  [address] [facility], [address] [place] [address]  [phone]      Medical Progress Note      NAME: Farshad Hernandez   :  1932  MRM:  [id number]    Date/Time: 2018  12:56 PM       Assessment and Plan:     Symptomatic normocytic anemia of unclear etiology / melena. Stool for occult blood in ER is negative. GI consulted for EGD but want to see patient stabilize first. Hematology consult given abnormal diff and multiple Ab. KAY positive and suspicious for autoimmune hemolytic anemia but haptoglobin    Hypercalcemia. Corrected to 12.7 on admission, now normal 9.5. SPEP and light chains pending.      Interstitial lung disease / Hypoxia / chronic respiratory failure. Appreciate pulmonary input. Continue O2 supplement to keep SAO2 > 90%, duo neb. Will likely need home oxygen but will see after able to get Hgb up.    ?2nd degree AV block, type 2 / Mod.-Severe MR / Mod.- Severe TR / pulmonary hypertension / Pericardial effusion. Appreciate cardiology consult. Avoid radha blockade. May need some diuresis to optimize respiratory status     Hypertension. Not well controlled. Continue amlodipine and hydralazine PRN. May need to increase amlodipine or add another BP med       Rheumatoid arthritis ([facility] (2012). Continue pain meds      Generalized anxiety disorder (3/14/2012). Not on meds      CAD (coronary artery disease) (3/26/2012). On ASA but holding this while Hgb is low     GERD (gastroesophageal reflux disease). 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 Supportive care      MIKHAIL (acute kidney injury). Avoid nephrotic drugs. Continue IVF and monitor    Severe protein-caloric malnutrition. Nutrition consult              Subjective:     Chief Complaint:  Patient seen and examined. Chart reviewed. Patient denies SOB at present.  Denies any fever or chills    ROS:  (bold if positive, if negative)      Tolerating PT  Tolerating Diet        Objective: Last 24hrs VS reviewed since prior progress note.  Most recent are:    Visit Vitals    /70    Pulse 78    Temp 98.1 °F (36.7 °C)    Resp 25    Ht 4' 10\" (1.473 m)    Wt 43.1 kg (95 lb)    SpO2 97%    BMI 19.86 kg/m2     SpO2 Readings from Last 6 Encounters:   06/05/18 97%   04/20/18 99%   10/27/17 97%   02/22/17 99%   02/17/17 95%   02/15/17 97%    O2 Flow Rate (L/min): 3 l/min       Intake/Output Summary (Last 24 hours) at 06/05/18 0515  Last data filed at 06/05/18 0205   Gross per 24 hour   Intake            992.5 ml   Output              250 ml   Net            742.5 ml        Physical Exam:    Gen:  Well-developed, well-nourished, in no acute distress  HEENT:  Pink conjunctivae, PERRL, hearing intact to voice, moist mucous membranes  Neck:  Supple, without masses, thyroid non-tender  Resp:  No accessory muscle use, fine crackles bilaterally  Card:  No murmurs, normal S1, S2 without thrills, bruits, trace peripheral edema  Abd:  Soft, non-tender, non-distended, normoactive bowel sounds are present, no palpable organomegaly and no detectable hernias  Lymph:  No cervical or inguinal adenopathy  Musc:  No cyanosis or clubbing  Skin:  No rashes or ulcers, skin turgor is good  Neuro:  Cranial nerves are grossly intact, no focal motor weakness, follows commands appropriately  Psych:  Good insight, oriented to person, place and time, alert    Telemetry reviewed:   1st degree AV block at present  __________________________________________________________________  Medications Reviewed: (see below)  Medications:     Current Facility-Administered Medications   Medication Dose Route Frequency    amLODIPine (NORVASC) tablet 5 mg  5 mg Oral DAILY AFTER BREAKFAST    therapeutic multivitamin (THERAGRAN) tablet 1 Tab  1 Tab Oral DAILY    acetaminophen (TYLENOL) tablet 1,000 mg  1,000 mg Oral QHS    albuterol-ipratropium (DUO-NEB) 2.5 MG-0.5 MG/3 ML  3 mL Nebulization Q4HWA RT    0.9% sodium chloride infusion 250 mL  250 mL IntraVENous PRN    0.9% sodium chloride infusion 250 mL  250 mL IntraVENous PRN    acetaminophen (TYLENOL) tablet 1,000 mg  1,000 mg Oral DAILY PRN    ferrous sulfate tablet 325 mg  1 Tab Oral DAILY WITH BREAKFAST    sodium chloride (NS) flush 5-10 mL  5-10 mL IntraVENous Q8H    sodium chloride (NS) flush 5-10 mL  5-10 mL IntraVENous PRN    0.9% sodium chloride infusion  75 mL/hr IntraVENous CONTINUOUS    hydrALAZINE (APRESOLINE) 20 mg/mL injection 10 mg  10 mg IntraVENous Q2H PRN    0.9% sodium chloride infusion 250 mL  250 mL IntraVENous PRN    pantoprazole (PROTONIX) tablet 40 mg  40 mg Oral ACB        Lab Data Reviewed: (see below)  Lab Review:     Recent Labs      06/04/18 1620 06/04/18   0025 06/03/18 1923   WBC   --   9.0  10.9   HGB  6.7*  6.4*  6.8*   HCT  22.5*  21.2*  22.2*   PLT   --   620*  643*     Recent Labs      06/04/18 1620 06/04/18   0025  06/03/18 1923   NA  138  139  141   K  4.1  4.2  4.4   CL  107  108  108   CO2  25  24  24   GLU  114*  89  89   BUN  28*  32*  35*   CREA  1.04*  1.17*  1.18*   CA  7.8*  7.8*  10.7*  8.7   ALB  1.4*   --   1.5*   TBILI  0.2   --   0.3   SGOT  22   --   28   ALT  13   --   15     Lab Results   Component Value Date/Time    Glucose (POC) 88 12/16/2016 04:21 PM    Glucose (POC) 106 (H) 12/16/2016 02:05 PM    Glucose (POC) 79 12/16/2016 11:52 AM    Glucose (POC) 82 12/16/2016 07:43 AM    Glucose (POC) 89 12/15/2016 08:49 PM     No results for input(s): PH, PCO2, PO2, HCO3, FIO2 in the last 72 hours. No results for input(s): INR in the last 72 hours. No lab exists for component: Bryna Canavan  All Micro Results     Procedure Component Value Units Date/Time    URINE CULTURE HOLD SAMPLE [[id number]] Collected:  06/04/18 0146    Order Status:  Completed Specimen:  Urine from Serum Updated:  06/04/18 0159     Urine culture hold         URINE ON HOLD IN MICROBIOLOGY DEPT FOR 3 DAYS.  IF UNPRESERVED URINE IS SUBMITTED, IT CANNOT BE USED FOR ADDITIONAL TESTING AFTER 24 HRS, RECOLLECTION WILL BE REQUIRED. I have reviewed notes of prior 24hr.     Other pertinent lab: None    Total time spent with patient: 28 [address] discussed with: Patient, Family, Nursing Staff, Consultant/Specialist and >50% of time spent in counseling and coordination of care    Discussed:  Care Plan    Prophylaxis:  SCD's    Disposition:   PT, OT, RN           ___________________________________________________    Attending Physician: Parag Paz, DO

## 2018-06-05 NOTE — TELEPHONE ENCOUNTER
Tonya from the [facility] oncology department is calling stating that she received the LOV and last labs but she needs the whole year lab results from 2/29/2016 till 2/22/2017    Best call back if any questions :[phone]  Fax [phone]

## 2018-06-05 NOTE — TELEPHONE ENCOUNTER
[phone] spoke to Travis Pa advised her that the only labs we have was 4/20/2018 prior to that was 2015 Travis Pa understand

## 2018-06-05 NOTE — PROGRESS NOTES
[facility] [place]   : 1932 Admit Date: 6/3/2018   Phone: [phone]  Room: 336/01   PCP: Moriah Jones MD  MRN: [id number]   Date: 2018  Code: DNR          Chart and notes reviewed. Data reviewed. I review the patient's current medications in the medical record at each encounter. I have evaluated and examined the patient. Overnight events  Afebrile  Sats 99% on 3L  VSS  No new am labs    ECHO: EF 55-60%; moderate to severe MR; moderate to severe TR; PASP 67; moderate pericardial effusion    ROS: Has no specific complaints this morning but states she \"just doesn't feel well. \"  States she feels \"blah. \"  Denies SOB. Denies CP. Denies fever, chills, or cough. Denies abd pain. Denies LE pain/swelling. Slept fine last night.       Past Medical History:   Diagnosis Date    CAD (coronary artery disease)     CAD    Dementia in Alzheimer's disease     Depression     DM type 2 causing vascular disease (HCC)     GERD (gastroesophageal reflux disease)     Hx of tuberculosis     as child    Hypercholesterolemia     Hypertension     Osteoporosis, post-menopausal     Rheumatoid arthritis(714.0)        Past Surgical History:   Procedure Laterality Date    HX CATARACT REMOVAL      left with lens implant    HX CORONARY ARTERY BYPASS GRAFT      HX CORONARY STENT PLACEMENT      HX KNEE REPLACEMENT Left     HX MOHS PROCEDURES      right       Family History   Problem Relation Age of Onset    Colon Cancer Mother     Cancer Father     Heart Disease Child              Social History   Substance Use Topics    Smoking status: Never Smoker    Smokeless tobacco: Never Used    Alcohol use No       Allergies   Allergen Reactions    Codeine Nausea and Vomiting       Current Facility-Administered Medications   Medication Dose Route Frequency    amLODIPine (NORVASC) tablet 5 mg  5 mg Oral DAILY AFTER BREAKFAST    therapeutic multivitamin (THERAGRAN) tablet 1 Tab 1 Tab Oral DAILY    acetaminophen (TYLENOL) tablet 1,000 mg  1,000 mg Oral QHS    albuterol-ipratropium (DUO-NEB) 2.5 MG-0.5 MG/3 ML  3 mL Nebulization Q4HWA RT    0.9% sodium chloride infusion 250 mL  250 mL IntraVENous PRN    0.9% sodium chloride infusion 250 mL  250 mL IntraVENous PRN    acetaminophen (TYLENOL) tablet 1,000 mg  1,000 mg Oral DAILY PRN    ferrous sulfate tablet 325 mg  1 Tab Oral DAILY WITH BREAKFAST    sodium chloride (NS) flush 5-10 mL  5-10 mL IntraVENous Q8H    sodium chloride (NS) flush 5-10 mL  5-10 mL IntraVENous PRN    0.9% sodium chloride infusion  75 mL/hr IntraVENous CONTINUOUS    hydrALAZINE (APRESOLINE) 20 mg/mL injection 10 mg  10 mg IntraVENous Q2H PRN    0.9% sodium chloride infusion 250 mL  250 mL IntraVENous PRN    pantoprazole (PROTONIX) tablet 40 mg  40 mg Oral ACB         REVIEW OF SYSTEMS   12 point ROS negative except as stated in the HPI. Physical Exam:   Visit Vitals    /73    Pulse 80    Temp 98 °F (36.7 °C)    Resp 28    Ht 4' 10\" (1.473 m)    Wt 43.1 kg (95 lb)    SpO2 99%    BMI 19.86 kg/m2       General:  Alert, cooperative, thin/frail, poor historian, no distress, appears stated age. Head:  Normocephalic, without obvious abnormality, atraumatic. Eyes:  Conjunctivae/corneas clear. Nose: Nares normal. Septum midline. Mucosa normal.    Throat: Lips, mucosa, and tongue normal.    Neck: Supple, symmetrical, trachea midline, no adenopathy. Lungs: Inspiratory crackles in the lower lung fields. Upper lung fields mostly clear. No wheeze or rhonchi appreciated. Resp no labored. Chest wall:  No tenderness or deformity. Heart:  Regular rate and rhythm, S1, S2 normal, no murmur, click, rub or gallop. Abdomen:   Soft, non-tender. Bowel sounds normal. No masses,  No organomegaly. Extremities: Extremities normal, atraumatic, no cyanosis or edema. Hand with arthritis and joint deformity. + muscle wasting.    Pulses: 2+ and symmetric all extremities. Skin: Skin color, texture, turgor normal. No rashes or lesions   Lymph nodes: Cervical, supraclavicular nodes normal.   Neurologic: Grossly nonfocal, poor insight. Lab Results   Component Value Date/Time    Sodium 138 06/04/2018 04:20 PM    Potassium 4.1 06/04/2018 04:20 PM    Chloride 107 06/04/2018 04:20 PM    CO2 25 06/04/2018 04:20 PM    BUN 28 (H) 06/04/2018 04:20 PM    Creatinine 1.04 (H) 06/04/2018 04:20 PM    Glucose 114 (H) 06/04/2018 04:20 PM    Calcium 7.8 (L) 06/04/2018 04:20 PM    Calcium 7.8 (L) 06/04/2018 04:20 PM    Magnesium 1.7 12/16/2016 04:29 AM    Phosphorus 2.5 (L) 09/05/2015 02:00 AM    Lactic acid 1.3 11/13/2012 11:05 AM       Lab Results   Component Value Date/Time    WBC 9.0 06/04/2018 12:25 AM    HGB 6.7 (L) 06/04/2018 04:20 PM    PLATELET 711 (H) 29/39/1387 12:25 AM    MCV 82.5 06/04/2018 12:25 AM       Lab Results   Component Value Date/Time    INR 1.1 02/26/2012 11:55 PM    aPTT 30.1 02/26/2012 11:55 PM    AST (SGOT) 22 06/04/2018 04:20 PM    Alk.  phosphatase 104 06/04/2018 04:20 PM    Protein, total 9.2 (H) 06/04/2018 04:20 PM    Albumin 1.4 (L) 06/04/2018 04:20 PM    Globulin 7.8 (H) 06/04/2018 04:20 PM       Lab Results   Component Value Date/Time    Iron 24 (L) 06/03/2018 11:56 PM    TIBC 120 (L) 06/03/2018 11:56 PM    Iron % saturation 20 06/03/2018 11:56 PM    Ferritin 324 (H) 06/04/2018 12:43 AM       Lab Results   Component Value Date/Time    Sed rate (ESR) 75 (H) 08/13/2015 02:23 PM    C-Reactive protein 10.70 (H) 08/26/2012 06:50 AM    TSH 1.78 12/15/2016 05:10 AM        No results found for: PH, PHI, PCO2, PCO2I, PO2, PO2I, HCO3, HCO3I, FIO2, FIO2I    Lab Results   Component Value Date/Time    CK 58 09/04/2015 12:15 PM    CK-MB Index CANNOT BE CALCULATED 09/04/2015 12:15 PM    Troponin-I, Qt. 0.04 06/03/2018 07:23 PM    BNP 49 03/29/2015 03:50 PM        Lab Results   Component Value Date/Time    Culture result: MIXED UROGENITAL LEILANI ISOLATED 03/01/2016 02:29 AM    Culture result: MIXED SKIN LEILANI ISOLATED 07/03/2013 07:44 PM    Culture result: NO GROWTH 5 DAYS 11/13/2012 09:35 AM    Culture result: NO GROWTH 6 DAYS 11/13/2012 09:35 AM       No results found for: TOXA1, RPR, HBCM, HBSAG, HAAB, HCAB1, HAAT, G6PD, CRYAC, HIVGT, HIVR, HIV1, HIV12, HIVPC, HIVRPI    Lab Results   Component Value Date/Time    CK 58 09/04/2015 12:15 PM    CK 51 11/13/2012 09:30 AM       Lab Results   Component Value Date/Time    Color YELLOW/STRAW 06/04/2018 01:46 AM    Appearance CLOUDY (A) 06/04/2018 01:46 AM    Specific gravity 1.025 03/01/2016 02:29 AM    pH (UA) 5.5 06/04/2018 01:46 AM    Protein TRACE (A) 06/04/2018 01:46 AM    Glucose NEGATIVE  06/04/2018 01:46 AM    Ketone TRACE (A) 06/04/2018 01:46 AM    Bilirubin NEGATIVE  06/04/2018 01:46 AM    Blood NEGATIVE  06/04/2018 01:46 AM    Urobilinogen 0.2 06/04/2018 01:46 AM    Nitrites NEGATIVE  06/04/2018 01:46 AM    Leukocyte Esterase TRACE (A) 06/04/2018 01:46 AM    WBC 0-4 06/04/2018 01:46 AM    RBC 0-5 06/04/2018 01:46 AM    Epithelial cells 0-10 11/02/2012 09:00 AM    Bacteria NEGATIVE  06/04/2018 01:46 AM       Images: no new images this morning    Chest CT (6/4/18): Progressive IPF/UIP. The lower lobes are largely replaced with fibrosis and honeycombing, and there is extension into the right middle lobe and lingula. Superimposed CHF: Interstitial edema, trace pericardial and pleural effusions, and cardiomegaly. Mildly enlarged left axillary lymph nodes. Probable enlarged mediastinal lymph nodes. These may be reactive in the setting of CHF. IMPRESSION  · Abnormal chest imaging consistent with progressive IPF/UIP  · Anemia  · HTN  · RA  · GERD  · CAD  · Anxiety    PLAN  · Wean off O2 as able for sats > 90%; not on home O2 at baseline  · CT findings slowly progessive and pt relatively asymptomatic. Pt elderly with dementia and not a good candidate for Esbriet/OFEV.  Would continue to serially monitor if family wishes to do so. · Continue Duonebs  · D/C IVFs  · GI has been consulted. Patient remains with Hgb < 7 despite 2 units PRBCs. Chronic, slowly progressive fibrosis should not preclude endoscopic evaluation if indicated. Discussed with GI NP Kevin Harp. · Check H/H and transfuse as needed if < 7    · BP control per primary team with amlodipine and prn hydralazine. · GI prophylaxis: Protonix  · DVT prophylaxis: SCDs    Patient is stable from a pulmonary standpoint. We will sign off and arrange for follow up outpatient in 2-4 weeks if family amenable.     Rusty Grady

## 2018-06-05 NOTE — PROGRESS NOTES
Nutrition:    New consult noted. RD assessed pt 6/4. ONS added yesterday. Changed today to Compact 2/2 pt only drinking 1/2 per RN. Compact is more kcal dense. Encourage PO intakes and small frequent meals along with snacks. Please take standing weight if possible.      Jagdish Villafuerte, [address]

## 2018-06-05 NOTE — PROGRESS NOTES
Problem: Pressure Injury - Risk of  Goal: *Prevention of pressure injury  Document Taz Scale and appropriate interventions in the flowsheet. Outcome: Progressing Towards Goal  Pressure Injury Interventions:       Moisture Interventions: Absorbent underpads, Check for incontinence Q2 hours and as needed, Internal/External urinary devices, Maintain skin hydration (lotion/cream)    Activity Interventions: Increase time out of bed, Pressure redistribution bed/mattress(bed type), PT/OT evaluation    Mobility Interventions: HOB 30 degrees or less, Pressure redistribution bed/mattress (bed type), PT/OT evaluation    Nutrition Interventions: Document food/fluid/supplement intake, Discuss nutritional consult with provider    Friction and Shear Interventions: HOB 30 degrees or less, Sit at 90-degree angle, Transferring/repositioning devices               Problem: Falls - Risk of  Goal: *Absence of Falls  Document Macarena Fall Risk and appropriate interventions in the flowsheet.    Outcome: Progressing Towards Goal  Fall Risk Interventions:  Mobility Interventions: Assess mobility with egress test, Bed/chair exit alarm, OT consult for ADLs, Patient to call before getting OOB, PT Consult for mobility concerns, PT Consult for assist device competence    Mentation Interventions: Adequate sleep, hydration, pain control, Bed/chair exit alarm, Familiar objects from home, More frequent rounding    Medication Interventions: Assess postural VS orthostatic hypotension, Bed/chair exit alarm, Patient to call before getting OOB, Teach patient to arise slowly    Elimination Interventions: Bed/chair exit alarm, Call light in reach, Toileting schedule/hourly rounds

## 2018-06-05 NOTE — PROGRESS NOTES
1930  Bedside and Verbal shift change report given to Awilda Mac (oncoming nurse) by Roosevelt Santamaria RN (offgoing nurse). Report included the following information SBAR, Kardex, Procedure Summary, Intake/Output, MAR, Accordion and Recent Results. 8955  Was informed by the Lab that only 2 units of PRBC's were ordered. Visit Vitals    /60    Pulse 78    Temp 98 °F (36.7 °C)    Resp 20    Ht 4' 10\" (1.473 m)    Wt 43.1 kg (95 lb)    SpO2 96%    BMI 19.86 kg/m2     0730  Bedside and Verbal shift change report given to [employer] (oncoming nurse) by Loly Herring RN (offgoing nurse). Report included the following information SBAR, Kardex, Procedure Summary, Intake/Output, MAR, Accordion and Recent Results.

## 2018-06-05 NOTE — PROGRESS NOTES
0745- Bedside report received patient resting in bed son at bedside. ~ KARY Amor RN     0930- Patient states she just isn't feeling well, no pain but just feeling weaker today. ~ KARY Amor RN     1200- Patient resting today, no acute concerns she has had some episodes of a-symptomatic bradycardia. Belkis Amor RN     1600- Family has called to check on patient and she has had visitors. Belkis Amor RN     1900-Bedside and Verbal shift change report given to [address] [place] [address]  (oncoming nurse) by Eagle Barba. Mt NIÑO  (offgoing nurse). Report included the following information SBAR, Kardex and Med Rec Status.

## 2018-06-05 NOTE — PROGRESS NOTES
[address] NP  [phone]           GI PROGRESS NOTE        NAME: Cecilia Oakes   :  1932   MRN:  [id number]       Subjective:   \"I feel tired. \" Denies nausea, vomiting, abdominal pain,  Reports no bowel movements overnight. Objective:         VITALS:   Last 24hrs VS reviewed since prior progress note. Most recent are:  Visit Vitals    /73    Pulse (!) 49    Temp 98 °F (36.7 °C)    Resp 28    Ht 4' 10\" (1.473 m)    Wt 43.1 kg (95 lb)    SpO2 99%    BMI 19.86 kg/m2       Intake/Output Summary (Last 24 hours) at 18 1004  Last data filed at 18 0641   Gross per 24 hour   Intake           980.85 ml   Output              400 ml   Net           580.85 ml       PHYSICAL EXAM:  General: Alert, in no acute distress    HEENT: Anicteric sclerae. Lungs:            Coarse Breath Sounds Bilaterally. Heart:  Regular  rhythm,    Abdomen: Soft, Non distended, Non tender.  (+)Bowel sounds, no HSM  Extremities: No c/c/e  Neurologic:  CN 2-12 gi, Alert and oriented X 2. No acute neurological distress   Psych:   Not anxious nor agitated.     Lab Data Reviewed:   Recent Labs      18   1620  18   0025  18   WBC   --   9.0  10.9   HGB  6.7*  6.4*  6.8*   HCT  22.5*  21.2*  22.2*   PLT   --   620*  643*     Recent Labs      18   1620  18   0025   NA  138  139   K  4.1  4.2   CL  107  108   CO2  25  24   BUN  28*  32*   CREA  1.04*  1.17*   GLU  114*  89   CA  7.8*  7.8*  10.7*     Recent Labs      18   1620  18   SGOT  22  28   AP  104  108   TP  9.2*  9.9*   ALB  1.4*  1.5*   GLOB  7.8*  8.4*       ________________________________________________________________________  Patient Active Problem List   Diagnosis Code    Rheumatoid arthritis ([facility] [address] [address] M06.9    Mixed hyperlipidemia E78.2    Generalized anxiety disorder F41.1    Cervical spondylosis without myelopathy M47.812    CAD (coronary artery disease) I25.10    Osteoporosis, 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 - Pulmonary has cleared her for endoscopic evaluation. Will review possibility of procedure with Dr. Clayton Brown. His documentation to follow  - No NSAIDs  - Continue PPI  - Trend HgB and transfuse to goal of 7. Will continue to follow.        Signed By: Skyla Vallejo NP     6/5/2018  10:04 AM

## 2018-06-05 NOTE — TELEPHONE ENCOUNTER
Nena Philip NP calling on behalf of patient from [facility]  Hematology . She states patient was admitted on 6/3/18 for anemia, SOB, dark stools. She is requesting to have patients records sent over to there office. She is wanting to have patients historical lab work from the past 3-6 months or 1year , and last office visit notes.  Patients LOV Friday, April 20, 2018 03:00 PM    Attention: Nena Philip NP Hematology   Best call back [phone]  Fax: [phone]

## 2018-06-05 NOTE — PROGRESS NOTES
[facility] [place] [address]  [address] [facility], [address] [place] [id number]  W: [phone]  F: [phone]             Hematology Oncology Consult Follow Up      History of Present Illness:   Ms. Misael Morgan is an 79 y/o female with Rheumatoid arthritis (RA), HTN, CAD, DMII, dementia who was admitted with nonproductive cough, SOB, fatigue and dark stools. She was taking oral iron supplements for several months, but she recently switched to multivitamin due to GI intolerance of the iron. Her son reports that patient has had dark brown stools, but no black stools and no red blood in stools. Hemoccult in ED negative. He states she has not had any prior blood transfusions as far as he knows. She is not on any treatment aside from Tylenol for the RA. She denies any abdominal pain, CP. No recent fevers, chills, sweats. She has had unintentional weight loss of 75-lbs over the past 2-3 yrs. ED labs notable for Hgb 6.8, . Chest CT showed progression of pulmonary fibrosis as well as interstitial edema. Today, patient states she still feels short of breath. No acute overnight events. 2 Units of blood transfused early this morning. Repeat CBC not yet done.     Past Medical History:   Diagnosis Date    CAD (coronary artery disease)     CAD    Dementia in Alzheimer's disease     Depression     DM type 2 causing vascular disease ([facility] [address]     GERD (gastroesophageal reflux disease)     Hx of tuberculosis     as child    Hypercholesterolemia     Hypertension     Osteoporosis, post-menopausal     Rheumatoid arthritis(714.0)       Past Surgical History:   Procedure Laterality Date    HX CATARACT REMOVAL      left with lens implant    HX CORONARY ARTERY BYPASS GRAFT      HX CORONARY STENT PLACEMENT      HX KNEE REPLACEMENT Left     HX MOHS PROCEDURES      right      Social History   Substance Use Topics    Smoking status: Never Smoker    Smokeless tobacco: Never Used    Alcohol use No Family History   Problem Relation Age of Onset    Colon Cancer Mother     Cancer Father     Heart Disease Child          Mother had pancreatic cancer per patient's son. Current Facility-Administered Medications   Medication Dose Route Frequency    amLODIPine (NORVASC) tablet 5 mg  5 mg Oral DAILY AFTER BREAKFAST    therapeutic multivitamin (THERAGRAN) tablet 1 Tab  1 Tab Oral DAILY    acetaminophen (TYLENOL) tablet 1,000 mg  1,000 mg Oral QHS    albuterol-ipratropium (DUO-NEB) 2.5 MG-0.5 MG/3 ML  3 mL Nebulization Q4HWA RT    0.9% sodium chloride infusion 250 mL  250 mL IntraVENous PRN    0.9% sodium chloride infusion 250 mL  250 mL IntraVENous PRN    acetaminophen (TYLENOL) tablet 1,000 mg  1,000 mg Oral DAILY PRN    ferrous sulfate tablet 325 mg  1 Tab Oral DAILY WITH BREAKFAST    sodium chloride (NS) flush 5-10 mL  5-10 mL IntraVENous Q8H    sodium chloride (NS) flush 5-10 mL  5-10 mL IntraVENous PRN    hydrALAZINE (APRESOLINE) 20 mg/mL injection 10 mg  10 mg IntraVENous Q2H PRN    0.9% sodium chloride infusion 250 mL  250 mL IntraVENous PRN    pantoprazole (PROTONIX) tablet 40 mg  40 mg Oral ACB      Allergies   Allergen Reactions    Codeine Nausea and Vomiting        Review of Systems: A complete review of systems was obtained, negative except as described above. Physical Exam:     Visit Vitals    /73    Pulse 80    Temp 98 °F (36.7 °C)    Resp 28    Ht 4' 10\" (1.473 m)    Wt 95 lb (43.1 kg)    SpO2 99%    BMI 19.86 kg/m2     ECOG PS: 2-3  General: No distress, cachectic  Eyes: PERRLA, anicteric sclerae  HENT: Atraumatic with normal appearance of ears and nose; OP clear  Neck: Supple; no thyromegaly   Lymphatic: No cervical, supraclavicular or inguinal LAD. 1 small soft mobile palpable LN in left axilla. Respiratory: Bibasilar crackles, otherwise CTAB, normal respiratory effort, wearing nasal cannalu with supplemental O2 in place.   CV: Normal rate, regular rhythm, no murmurs, no peripheral edema  GI: Soft, nontender, nondistended, no masses, no hepatomegaly, no splenomegaly  MS: Normal gait and station. Digits without clubbing or cyanosis. Few bony nodules on wrists. Skin: No rashes, ecchymoses, or petechiae. Normal temperature, turgor, and texture. Neuro/Psych: Alert, oriented, appropriate affect, decreased memory judgement/insight. Results:     Lab Results   Component Value Date/Time    WBC 9.0 06/04/2018 12:25 AM    HGB 6.7 (L) 06/04/2018 04:20 PM    HCT 22.5 (L) 06/04/2018 04:20 PM    PLATELET 541 (H) 48/79/6631 12:25 AM    MCV 82.5 06/04/2018 12:25 AM    ABS. NEUTROPHILS 8.2 (H) 06/03/2018 07:23 PM    Hemoglobin (POC) 12.2 05/27/2010 04:07 PM    Hematocrit (POC) 36 05/27/2010 04:07 PM     Lab Results   Component Value Date/Time    Sodium 138 06/04/2018 04:20 PM    Potassium 4.1 06/04/2018 04:20 PM    Chloride 107 06/04/2018 04:20 PM    CO2 25 06/04/2018 04:20 PM    Glucose 114 (H) 06/04/2018 04:20 PM    BUN 28 (H) 06/04/2018 04:20 PM    Creatinine 1.04 (H) 06/04/2018 04:20 PM    GFR est AA >60 06/04/2018 04:20 PM    GFR est non-AA 50 (L) 06/04/2018 04:20 PM    Calcium 7.8 (L) 06/04/2018 04:20 PM    Calcium 7.8 (L) 06/04/2018 04:20 PM    Sodium (POC) 139 05/27/2010 04:07 PM    Potassium (POC) 4.1 05/27/2010 04:07 PM    Chloride (POC) 109 (H) 05/27/2010 04:07 PM    Glucose (POC) 88 12/16/2016 04:21 PM    BUN (POC) 30 (H) 05/27/2010 04:07 PM    Creatinine (POC) 1.1 05/27/2010 04:07 PM    Calcium, ionized (POC) 1.17 05/27/2010 04:07 PM     Lab Results   Component Value Date/Time    Bilirubin, total 0.2 06/04/2018 04:20 PM    ALT (SGPT) 13 06/04/2018 04:20 PM    AST (SGOT) 22 06/04/2018 04:20 PM    Alk.  phosphatase 104 06/04/2018 04:20 PM    Protein, total 9.2 (H) 06/04/2018 04:20 PM    Albumin 1.4 (L) 06/04/2018 04:20 PM    Globulin 7.8 (H) 06/04/2018 04:20 PM     Lab Results   Component Value Date/Time    Reticulocyte count 4.0 (H) 06/04/2018 04:20 PM    Iron % saturation 20 06/03/2018 11:56 PM    TIBC 120 (L) 06/03/2018 11:56 PM    Ferritin 324 (H) 06/04/2018 12:43 AM    Vitamin B12 1324 (H) 06/03/2018 11:56 PM    Folate 56.7 (H) 06/03/2018 11:56 PM    Haptoglobin 356 (H) 06/04/2018 04:20 PM    KAY Poly POS 06/03/2018 08:00 PM     06/04/2018 04:20 PM    Sed rate (ESR) 75 (H) 08/13/2015 02:23 PM    C-Reactive protein 10.70 (H) 08/26/2012 06:50 AM    C-Reactive Protein, Qt 249.5 (H) 08/13/2015 02:23 PM    TSH 1.78 12/15/2016 05:10 AM    Lipase 174 09/04/2015 12:15 PM    Hep C Virus Ab <0.1 03/15/2012 10:43 AM     Lab Results   Component Value Date/Time    INR 1.1 02/26/2012 11:55 PM    aPTT 30.1 02/26/2012 11:55 PM     Lab Results   Component Value Date/Time     06/04/2018 04:20 PM     6/3/2018 XR CHEST  IMPRESSION: Progression of pulmonary fibrosis, difficult to exclude superimposed pneumonia. 6/3/2018 CT CHEST WO CONT  IMPRESSION:  1. Progressive IPF/UIP. The lower lobes are largely replaced with fibrosis and honeycombing, and there is extension into the right middle lobe and lingula. 2. Superimposed CHF: Interstitial edema, trace pericardial and pleural effusions, and cardiomegaly. 3. Mildly enlarged left axillary lymph nodes. Probable enlarged mediastinal lymph nodes. These may be reactive in the setting of CHF. Assessment and Recommendations:   79 y/o with h/o of CAD, dementia, RA admitted with fatigue, cough associated with SOB. 1. Normocytic anemia: Hemoccult negative in ED. Iron profile shows iron sat 20% which is an improvement from prior iron sat 10% in 4/2018. With KAY positive, I was concerned for autoimmune hemolytic anemia - however, labs are not consistent with hemolysis. Patients can sometimes develop autoantibodies, but that does not confirm active hemolysis. Given the lack of clearcut hemolysis, I do not recommend treatment with steroids and need to further investigate the anemia.  I recommend proceeding with EGD, colonoscopy as well as bone marrow biopsy. Given both procedures require sedation, I recommend optimizing respiratory status if possible. -- f/u CBC  -- f/u SPEP, free light chains  -- Consideration of bone marrow biopsy, patient's son is considering this  -- GI workup with EGD, colonoscopy    2. Interstitial lung disease: Progression seen on CT. Pulmonary evaluating.  -- On nebulizers and supplemental O2.   -- Wean O2 as tolerated. 3. CAD s/p stent / Pulmonary edema / Bradycardia with 2nd degree AV block: Cardiology following. Agree that the mediastinal LNs are likely reactive. -- ECHO pending  -- Consider diuresis with Lasix to improve resp status. 4. Dementia: Continue supportive care    5. Thrombocytosis: Unclear whether this is reactive or primary. Will continue to monitor.       Signed By: Jazmyne Pereira MD

## 2018-06-06 NOTE — PROGRESS NOTES
Problem: Pressure Injury - Risk of  Goal: *Prevention of pressure injury  Document Taz Scale and appropriate interventions in the flowsheet. Outcome: Progressing Towards Goal  Pressure Injury Interventions:       Moisture Interventions: Apply protective barrier, creams and emollients, Assess need for specialty bed, Check for incontinence Q2 hours and as needed, Maintain skin hydration (lotion/cream)    Activity Interventions: Increase time out of bed, Pressure redistribution bed/mattress(bed type), PT/OT evaluation    Mobility Interventions: PT/OT evaluation, Assess need for specialty bed, HOB 30 degrees or less, Turn and reposition approx. every two hours(pillow and wedges)    Nutrition Interventions: Document food/fluid/supplement intake, Discuss nutritional consult with provider    Friction and Shear Interventions: Apply protective barrier, creams and emollients, Lift team/patient mobility team, Minimize layers, Transfer aides:transfer board/Blair lift/ceiling lift, Foam dressings/transparent film/skin sealants               Problem: Falls - Risk of  Goal: *Absence of Falls  Document Macarena Fall Risk and appropriate interventions in the flowsheet.    Outcome: Progressing Towards Goal  Fall Risk Interventions:  Mobility Interventions: Assess mobility with egress test, Bed/chair exit alarm, Patient to call before getting OOB, PT Consult for mobility concerns, Utilize gait belt for transfers/ambulation    Mentation Interventions: Adequate sleep, hydration, pain control, Bed/chair exit alarm, More frequent rounding, Reorient patient, Update white board    Medication Interventions: Assess postural VS orthostatic hypotension, Bed/chair exit alarm, Patient to call before getting OOB, Teach patient to arise slowly    Elimination Interventions: Bed/chair exit alarm, Call light in reach, Toileting schedule/hourly rounds

## 2018-06-06 NOTE — PROGRESS NOTES
Bedside and Verbal shift change report given to [id number] Murray (oncoming nurse) by Duran Mahoney RN (offgoing nurse). Report included the following information SBAR, ED Summary, Procedure Summary, Intake/Output and Recent Results.

## 2018-06-06 NOTE — PROGRESS NOTES
[address] NP  [phone]           GI PROGRESS NOTE        NAME: Osmany Mayes   :  1932   MRN:  [id number]       Subjective:   Pt reports \"just not feeling well today\"  No abdominal pain, nausea, vomiting. No recent BM today. Objective:         VITALS:   Last 24hrs VS reviewed since prior progress note. Most recent are:  Visit Vitals    /75    Pulse 91    Temp 97.6 °F (36.4 °C)    Resp 26    Ht 4' 10\" (1.473 m)    Wt 43.1 kg (95 lb)    SpO2 99%    BMI 19.86 kg/m2       Intake/Output Summary (Last 24 hours) at 18 1110  Last data filed at 18 0615   Gross per 24 hour   Intake              820 ml   Output              450 ml   Net              370 ml       PHYSICAL EXAM:  General: Alert, in no acute distress    HEENT: Anicteric sclerae. Lungs:            Coarse and diminished breath sounds bilaterally. Heart:  Regular  rhythm,    Abdomen: Soft, Non distended, Non tender.  (+)Bowel sounds, no HSM  Extremities: No c/c/e  Neurologic:  CN 2-12 gi, Alert and oriented X 2. No acute neurological distress   Psych:   Not anxious nor agitated. Lab Data Reviewed:   Recent Labs      18   0236  18   1001   18   0025   WBC  12.6*   --    --   9.0   HGB  11.0*  10.2*   < >  6.4*   HCT  35.5  32.1*   < >  21.2*   PLT  580*   --    --   620*    < > = values in this interval not displayed.      Recent Labs      18   0236  18   1620   NA  140  138   K  3.8  4.1   CL  108  107   CO2  22  25   BUN  25*  28*   CREA  1.02  1.04*   GLU  114*  114*   PHOS  4.2   --    CA  8.1*  7.8*  7.8*     Recent Labs      18   1620  18   1923   SGOT  22  28   AP  104  108   TP  9.2*  9.9*   ALB  1.4*  1.5*   GLOB  7.8*  8.4*       ________________________________________________________________________  Patient Active Problem List   Diagnosis Code    Rheumatoid arthritis (HCC) M06.9    Mixed hyperlipidemia E78.2    Generalized anxiety disorder F41.1    Cervical spondylosis without myelopathy M47.812    CAD (coronary artery disease) I25.10    Osteoporosis, post-menopausal M81.0    Interstitial lung disease (HCC) J84.9    Bradycardia R00.1    Hypertension I10    GERD (gastroesophageal reflux disease) K21.9    Depression F32.9    Dementia in Alzheimer's disease G30.9, F02.80    Mild intermittent asthma without complication Z06.87    Hypoxia R09.02    MIKHAIL (acute kidney injury) ([facility] N17.9    Anemia D64.9    Melena K92.1    Pulmonary fibrosis ([facility]) J84.10         Assessment and Plan:  Anemia:  HgB 11 after blood transfusions.  - Continue PPI now and at discharge. - Trend Hemoglobin and transfuse to goal of 7.  - No NSAIDs    Reviewed case with Dr. Derrick Mcmanus. Because of her medical co morbidities do not feel that EGD and Colonoscopy are appropriate at this time. OK to discharge from GI standpoint. Please have her follow up in our office with in 2 weeks of her discharge. Please contact us with any questions or concerns. Thank you.        Signed By: Matt Boland NP     6/6/2018  11:10 AM

## 2018-06-06 NOTE — PROGRESS NOTES
I called pt's son Presley Lacy to discuss disposition plans [phone]. I will continue to follow.  Thanks TAL [place]

## 2018-06-06 NOTE — PROGRESS NOTES
1930  Bedside and Verbal shift change report given to Marck Cuevas (oncoming nurse) by Shane Arenas RN (offgoing nurse). Report included the following information SBAR, Kardex, Procedure Summary, Intake/Output, MAR, Accordion and Recent Results. Visit Vitals    /73    Pulse 92    Temp 97.8 °F (36.6 °C)    Resp 18    Ht 4' 10\" (1.473 m)    Wt 43.1 kg (95 lb)    SpO2 97%    BMI 19.86 kg/m2     0730  Bedside and Verbal shift change report given to Vishnu NIÑO (oncoming nurse) by Juan Jose Alcaraz RN (offgoing nurse). Report included the following information SBAR, Kardex, Procedure Summary, Intake/Output, MAR, Accordion and Recent Results.

## 2018-06-06 NOTE — PROGRESS NOTES
[facility] at [address], [address] [place] [address] [phone] [id number]  W: [phone]  F: [phone]             Hematology Oncology Consult Follow Up      History of Present Illness:   Ms. Samuel Dover is an 81 y/o female with Rheumatoid arthritis (RA), HTN, CAD, DMII, dementia who was admitted with nonproductive cough, SOB, fatigue and dark stools. She was taking oral iron supplements for several months, but she recently switched to multivitamin due to GI intolerance of the iron. Her son reports that patient has had dark brown stools, but no black stools and no red blood in stools. Hemoccult in ED negative. He states she has not had any prior blood transfusions as far as he knows. She is not on any treatment aside from Tylenol for the RA. She denies any abdominal pain, CP. No recent fevers, chills, sweats. She has had unintentional weight loss of 75-lbs over the past 2-3 yrs. ED labs notable for Hgb 6.8, . Chest CT showed progression of pulmonary fibrosis as well as interstitial edema. Today, patient is very anxious and states she just woke up. No specific complaints as she denies pain. She does endorse shortness of breath. GI evaluated patient and discussed with family that they do not recommend EGD, colonoscopy at this time given respiratory status and bradycardia. Review of labs from PCP reveals Hgb 8 in 2016. Labs in 4/2018 showed WBC 7.2, Hgb 8.1, MCV 85, , iron 16 L , TIBC 156 L, iron sat 10% L, ferritin 336.     Past Medical History:   Diagnosis Date    CAD (coronary artery disease)     CAD    Dementia in Alzheimer's disease     Depression     DM type 2 causing vascular disease ([address]     GERD (gastroesophageal reflux disease)     Hx of tuberculosis     as child    Hypercholesterolemia     Hypertension     Osteoporosis, post-menopausal     Rheumatoid arthritis(714.0)       Past Surgical History:   Procedure Laterality Date    HX CATARACT REMOVAL left with lens implant    HX CORONARY ARTERY BYPASS GRAFT      HX CORONARY STENT PLACEMENT      HX KNEE REPLACEMENT Left     HX MOHS PROCEDURES      right      Social History   Substance Use Topics    Smoking status: Never Smoker    Smokeless tobacco: Never Used    Alcohol use No      Family History   Problem Relation Age of Onset    Colon Cancer Mother     Cancer Father     Heart Disease Child          Mother had pancreatic cancer per patient's son. Current Facility-Administered Medications   Medication Dose Route Frequency    amLODIPine (NORVASC) tablet 5 mg  5 mg Oral DAILY AFTER BREAKFAST    therapeutic multivitamin (THERAGRAN) tablet 1 Tab  1 Tab Oral DAILY    acetaminophen (TYLENOL) tablet 1,000 mg  1,000 mg Oral QHS    albuterol-ipratropium (DUO-NEB) 2.5 MG-0.5 MG/3 ML  3 mL Nebulization Q4HWA RT    0.9% sodium chloride infusion 250 mL  250 mL IntraVENous PRN    0.9% sodium chloride infusion 250 mL  250 mL IntraVENous PRN    acetaminophen (TYLENOL) tablet 1,000 mg  1,000 mg Oral DAILY PRN    ferrous sulfate tablet 325 mg  1 Tab Oral DAILY WITH BREAKFAST    sodium chloride (NS) flush 5-10 mL  5-10 mL IntraVENous Q8H    sodium chloride (NS) flush 5-10 mL  5-10 mL IntraVENous PRN    hydrALAZINE (APRESOLINE) 20 mg/mL injection 10 mg  10 mg IntraVENous Q2H PRN    0.9% sodium chloride infusion 250 mL  250 mL IntraVENous PRN    pantoprazole (PROTONIX) tablet 40 mg  40 mg Oral ACB      Allergies   Allergen Reactions    Codeine Nausea and Vomiting        Review of Systems: A complete review of systems was obtained, negative except as described above.     Physical Exam:     Visit Vitals    /74 (BP Patient Position: At rest)    Pulse 98    Temp 97.9 °F (36.6 °C)    Resp 26    Ht 4' 10\" (1.473 m)    Wt 95 lb (43.1 kg)    SpO2 99%    BMI 19.86 kg/m2     ECOG PS: 2-3  General: No distress, cachectic  Eyes: PERRLA, anicteric sclerae  HENT: Atraumatic with normal appearance of ears and nose; OP clear  Neck: Supple; no thyromegaly   Lymphatic: No cervical, supraclavicular or inguinal LAD. 1 small soft mobile palpable LN in left axilla. Respiratory: Bibasilar crackles, diffuse expiratory wheezes, with somewhat shallow breathing, wearing nasal cannula with supplemental O2 in place. CV: Normal rate, regular rhythm, no murmurs, no peripheral edema  GI: Soft, nontender, nondistended, no masses, no hepatomegaly, no splenomegaly  MS: Normal gait and station. Digits without clubbing or cyanosis. Few bony nodules on wrists. Skin: No rashes, ecchymoses, or petechiae. Normal temperature, turgor, and texture. Neuro/Psych: Alert, oriented, appropriate affect, decreased memory judgement/insight. Results:     Lab Results   Component Value Date/Time    WBC 12.6 (H) 06/06/2018 02:36 AM    HGB 11.0 (L) 06/06/2018 02:36 AM    HCT 35.5 06/06/2018 02:36 AM    PLATELET 568 (H) 52/53/1252 02:36 AM    MCV 82.6 06/06/2018 02:36 AM    ABS.  NEUTROPHILS 9.3 (H) 06/06/2018 02:36 AM    Hemoglobin (POC) 12.2 05/27/2010 04:07 PM    Hematocrit (POC) 36 05/27/2010 04:07 PM     Lab Results   Component Value Date/Time    Sodium 140 06/06/2018 02:36 AM    Potassium 3.8 06/06/2018 02:36 AM    Chloride 108 06/06/2018 02:36 AM    CO2 22 06/06/2018 02:36 AM    Glucose 114 (H) 06/06/2018 02:36 AM    BUN 25 (H) 06/06/2018 02:36 AM    Creatinine 1.02 06/06/2018 02:36 AM    GFR est AA >60 06/06/2018 02:36 AM    GFR est non-AA 51 (L) 06/06/2018 02:36 AM    Calcium 8.1 (L) 06/06/2018 02:36 AM    Sodium (POC) 139 05/27/2010 04:07 PM    Potassium (POC) 4.1 05/27/2010 04:07 PM    Chloride (POC) 109 (H) 05/27/2010 04:07 PM    Glucose (POC) 88 12/16/2016 04:21 PM    BUN (POC) 30 (H) 05/27/2010 04:07 PM    Creatinine (POC) 1.1 05/27/2010 04:07 PM    Calcium, ionized (POC) 1.17 05/27/2010 04:07 PM     Lab Results   Component Value Date/Time    Bilirubin, total 0.2 06/04/2018 04:20 PM    ALT (SGPT) 13 06/04/2018 04:20 PM    AST (SGOT) 22 06/04/2018 04:20 PM    Alk. phosphatase 104 06/04/2018 04:20 PM    Protein, total 9.2 (H) 06/04/2018 04:20 PM    Albumin 1.4 (L) 06/04/2018 04:20 PM    Globulin 7.8 (H) 06/04/2018 04:20 PM     Lab Results   Component Value Date/Time    Reticulocyte count 4.0 (H) 06/04/2018 04:20 PM    Iron % saturation 20 06/03/2018 11:56 PM    TIBC 120 (L) 06/03/2018 11:56 PM    Ferritin 324 (H) 06/04/2018 12:43 AM    Vitamin B12 1324 (H) 06/03/2018 11:56 PM    Folate 56.7 (H) 06/03/2018 11:56 PM    Haptoglobin 356 (H) 06/04/2018 04:20 PM    KAY Poly POS 06/03/2018 08:00 PM     06/04/2018 04:20 PM    Sed rate (ESR) 75 (H) 08/13/2015 02:23 PM    C-Reactive protein 10.70 (H) 08/26/2012 06:50 AM    C-Reactive Protein, Qt 249.5 (H) 08/13/2015 02:23 PM    TSH 3.82 (H) 06/06/2018 02:46 AM    Lipase 174 09/04/2015 12:15 PM    Hep C Virus Ab <0.1 03/15/2012 10:43 AM     Lab Results   Component Value Date/Time    INR 1.1 02/26/2012 11:55 PM    aPTT 30.1 02/26/2012 11:55 PM     Lab Results   Component Value Date/Time     06/04/2018 04:20 PM     6/3/2018 XR CHEST  IMPRESSION: Progression of pulmonary fibrosis, difficult to exclude superimposed pneumonia. 6/3/2018 CT CHEST WO CONT  IMPRESSION:  1. Progressive IPF/UIP. The lower lobes are largely replaced with fibrosis and honeycombing, and there is extension into the right middle lobe and lingula. 2. Superimposed CHF: Interstitial edema, trace pericardial and pleural effusions, and cardiomegaly. 3. Mildly enlarged left axillary lymph nodes. Probable enlarged mediastinal lymph nodes. These may be reactive in the setting of CHF. Assessment and Recommendations:   81 y/o with h/o of CAD, dementia, RA admitted with fatigue, cough associated with SOB. 1. Normocytic anemia: Likely anemia of chronic disease and long-standing per PCP notes. Hemoccult negative in ED. Iron profile shows iron sat 20% which is an improvement from prior iron sat 10% in 4/2018.  With KAY positive, I was concerned for autoimmune hemolytic anemia - however, labs are not consistent with hemolysis. Patients can sometimes develop autoantibodies, but that does not confirm active hemolysis. Given the lack of clearcut hemolysis, I do not recommend treatment with steroids and need to further investigate the anemia. Patient had a significant response to blood transfusion of 2 Units on evening of 5/4. Although bone marrow biopsy may still be helpful in ruling out other causes of her anemia, I feel that her overall clinical picture with progressive pulmonary fibrosis and dementia is grim. The utility of a bone marrow biopsy in this setting is less helpful. I will discuss with son. -- f/u SPEP, free light chains  -- Continue ferrous sulfate 325mg 1-2 times daily with meals    2. Interstitial lung disease: Progression seen on CT. Pulmonary evaluating.  -- On nebulizers and supplemental O2.   -- Wean O2 as tolerated. 3. CAD s/p stent / Pulmonary edema / Bradycardia with 2nd degree AV block: Cardiology following. Agree that the mediastinal LNs are likely reactive. Echo shows EF 55-60% and moderate pericardial effusion. -- Consider diuresis    4. Expiratory wheezing: More likely reactive airways than pulmonary edema since wheezes are new today. -- Consider nebulizers/inhalers    5. Dementia: Continue supportive care    6. Thrombocytosis: Unclear whether this is reactive or primary. Will continue to monitor.       Signed By: Samuel Marks MD

## 2018-06-06 NOTE — PROGRESS NOTES
[facility] [address]  [address], [place], [address] [place] [address]  [phone]      Medical Progress Note      NAME: Mali Johansen   :  1932  MRM:  [id number]    Date/Time: 2018         Assessment and Plan:     Discussed case with oncology, cardiology and GI. Further work up of anemia would require multiple procedures including EGD, colonoscopy and BMB. She is high risk for complications due to bradycardia and ILD. Will ask palliative care to evaluate for further goals of care    Symptomatic normocytic anemia of unclear etiology / melena. Stool for occult blood in ER is negative. GI following, they do not feel pt is a good candidate for endoscopy at this time. Hematology is following, pt may need BMB    ? 2nd degree AV block, type 2 / Mod.-Severe MR / Mod.- Severe TR / pulmonary hypertension / Pericardial effusion. Appreciate cardiology consult. Avoid radha blockade. Episode of bradycardia, continue to monitor on telemetry. After goals of care discussions are complete pt may need further eval for pacemaker if pt/family wish to proceed with aggressive care.      Interstitial lung disease / Hypoxia / chronic respiratory failure. Appreciate pulmonary input. Continue O2 supplement to keep SAO2 > 90%, duo neb. Pt reporting increased sob today she did receive IVF this hospital stay, will give dose of lasix and monitor    Hypercalcemia. Corrected to 12.7 on admission, now normal 9.5. SPEP and light chains pending. Hypertension. Not well controlled. Continue amlodipine and hydralazine PRN. May need to increase amlodipine or add another BP med       Rheumatoid arthritis ([facility] [address] (2012). Continue pain meds      Generalized anxiety disorder (3/14/2012). Not on meds      CAD (coronary artery disease) (3/26/2012). On ASA but holding this while Hgb is low     GERD (gastroesophageal reflux disease). 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  Supportive care    MIKHAIL (acute kidney injury). Avoid nephrotic drugs. Continue IVF and monitor    Severe protein-caloric malnutrition. Nutrition consult              Subjective:     Chief Complaint:  F/u fatigue. Pt noted to have episodes of bradycardia yesterday, plans for endoscopy put on hold due to this. She reports slight increase in her sob today. Denies cp, abd pain            Objective:     Last 24hrs VS reviewed since prior progress note.  Most recent are:    Visit Vitals    /75    Pulse 91    Temp 97.6 °F (36.4 °C)    Resp 26    Ht 4' 10\" (1.473 m)    Wt 43.1 kg (95 lb)    SpO2 98%    BMI 19.86 kg/m2     SpO2 Readings from Last 6 Encounters:   06/06/18 98%   04/20/18 99%   10/27/17 97%   02/22/17 99%   02/17/17 95%   02/15/17 97%    O2 Flow Rate (L/min): 2 l/min       Intake/Output Summary (Last 24 hours) at 06/06/18 1300  Last data filed at 06/06/18 1255   Gross per 24 hour   Intake              965 ml   Output              450 ml   Net              515 ml        Physical Exam:    Gen:  Well-developed, well-nourished, in no acute distress  HEENT:  Pink conjunctivae, PERRL, hearing intact to voice, moist mucous membranes  Neck:  Supple, without masses, thyroid non-tender  Resp:  No accessory muscle use, fine crackles bilaterally  Card:  No murmurs, normal S1, S2 without thrills, bruits, trace peripheral edema  Abd:  Soft, non-tender, non-distended, normoactive bowel sounds are present, no palpable organomegaly and no detectable hernias  Lymph:  No cervical or inguinal adenopathy  Musc:  No cyanosis or clubbing  Skin:  No rashes or ulcers, skin turgor is good  Neuro:  Cranial nerves are grossly intact, no focal motor weakness, follows commands appropriately  Psych:  Good insight, oriented to person, place and time, alert    Telemetry reviewed:   1st degree AV block at present  __________________________________________________________________  Medications Reviewed: (see below)  Medications:     Current Facility-Administered Medications   Medication Dose Route Frequency    amLODIPine (NORVASC) tablet 5 mg  5 mg Oral DAILY AFTER BREAKFAST    therapeutic multivitamin (THERAGRAN) tablet 1 Tab  1 Tab Oral DAILY    acetaminophen (TYLENOL) tablet 1,000 mg  1,000 mg Oral QHS    albuterol-ipratropium (DUO-NEB) 2.5 MG-0.5 MG/3 ML  3 mL Nebulization Q4HWA RT    0.9% sodium chloride infusion 250 mL  250 mL IntraVENous PRN    0.9% sodium chloride infusion 250 mL  250 mL IntraVENous PRN    acetaminophen (TYLENOL) tablet 1,000 mg  1,000 mg Oral DAILY PRN    ferrous sulfate tablet 325 mg  1 Tab Oral DAILY WITH BREAKFAST    sodium chloride (NS) flush 5-10 mL  5-10 mL IntraVENous Q8H    sodium chloride (NS) flush 5-10 mL  5-10 mL IntraVENous PRN    hydrALAZINE (APRESOLINE) 20 mg/mL injection 10 mg  10 mg IntraVENous Q2H PRN    0.9% sodium chloride infusion 250 mL  250 mL IntraVENous PRN    pantoprazole (PROTONIX) tablet 40 mg  40 mg Oral ACB        Lab Data Reviewed: (see below)  Lab Review:     Recent Labs      06/06/18   0236  06/05/18   1001  06/04/18   1620  06/04/18   0025  06/03/18   1923   WBC  12.6*   --    --   9.0  10.9   HGB  11.0*  10.2*  6.7*  6.4*  6.8*   HCT  35.5  32.1*  22.5*  21.2*  22.2*   PLT  580*   --    --   620*  643*     Recent Labs      06/06/18   0236  06/04/18   1620  06/04/18   0025  06/03/18   1923   NA  140  138  139  141   K  3.8  4.1  4.2  4.4   CL  108  107  108  108   CO2  22  25  24  24   GLU  114*  114*  89  89   BUN  25*  28*  32*  35*   CREA  1.02  1.04*  1.17*  1.18*   CA  8.1*  7.8*  7.8*  10.7*  8.7   MG  1.9   --    --    --    PHOS  4.2   --    --    --    ALB   --   1.4*   --   1.5*   TBILI   --   0.2   --   0.3   SGOT   --   22   --   28   ALT   --   13   --   15     Lab Results   Component Value Date/Time    Glucose (POC) 88 12/16/2016 04:21 PM    Glucose (POC) 106 (H) 12/16/2016 02:05 PM    Glucose (POC) 79 12/16/2016 11:52 AM    Glucose (POC) 82 12/16/2016 07:43 AM Glucose (POC) 89 12/15/2016 08:49 PM     No results for input(s): PH, PCO2, PO2, HCO3, FIO2 in the last 72 hours. No results for input(s): INR in the last 72 hours. No lab exists for component: Meghana Min  All Micro Results     Procedure Component Value Units Date/Time    URINE CULTURE HOLD SAMPLE [[id number]] Collected:  06/04/18 0146    Order Status:  Completed Specimen:  Urine from Serum Updated:  06/04/18 0159     Urine culture hold         URINE ON HOLD IN MICROBIOLOGY DEPT FOR 3 DAYS. IF UNPRESERVED URINE IS SUBMITTED, IT CANNOT BE USED FOR ADDITIONAL TESTING AFTER 24 HRS, RECOLLECTION WILL BE REQUIRED. I have reviewed notes of prior 24hr.     Other pertinent lab: None    Total time spent with patient: [address] discussed with: Patient, Family, Nursing Staff, Consultant/Specialist and >50% of time spent in counseling and coordination of care    Discussed:  Care Plan    Prophylaxis:  SCD's    Disposition:   PT, OT, RN           ___________________________________________________    Attending Physician: Chilango Marie MD

## 2018-06-06 NOTE — PROGRESS NOTES
Bedside and Verbal shift change report given to Vishnu RN (oncoming nurse) by Lissette Thurman RN (offgoing nurse). Report included the following information SBAR, Kardex, ED Summary, Procedure Summary, Intake/Output, MAR, Recent Results, Med Rec Status and Cardiac Rhythm 2nd Degree Type 1.

## 2018-06-07 NOTE — PROGRESS NOTES
Bedside and Verbal shift change report given to Bean (oncoming nurse) by Mely Brooks (offgoing nurse). Report included the following information SBAR, Kardex, Procedure Summary, Intake/Output, MAR, Accordion and Recent Results.

## 2018-06-07 NOTE — PROGRESS NOTES
3:06 PM   I met with the patient and her daughter-in-law Lisset [phone]. I also spoke to pt's son Fernanda Nash. The plan is to go to a SNF. I sent a referral in [facility] to [facility]. Thanks TAL Calvillo       Family meeting today at noon. I will continue to follow.  Thanks TAL Calvillo

## 2018-06-07 NOTE — PROGRESS NOTES
Rodolfo Dill [facility] [address]  [address] [facility], [address] [place] [address]  [phone]      Medical Progress Note      NAME: Lizzie Tirado   :  1932  MRM:  [id number]    Date/Time: 2018         Assessment and Plan:     Discussed case with oncology, cardiology and GI. Further work up of anemia would require multiple procedures including EGD, colonoscopy and BMB. She is high risk for complications due to bradycardia and ILD. Awaiting palliative care review    Symptomatic normocytic anemia of unclear etiology / melena. Stool for occult blood in ER is negative. GI following, they do not feel pt is a good candidate for endoscopy at this time. Hematology is following, pt may need BMB    ? 2nd degree AV block, type 2 / Mod.-Severe MR / Mod.- Severe TR / pulmonary hypertension / Pericardial effusion. Appreciate cardiology consult. Avoid radha blockade. Episode of bradycardia, continue to monitor on telemetry. After goals of care discussions are complete pt may need further eval for pacemaker if pt/family wish to proceed with aggressive care.      Interstitial lung disease / Hypoxia / chronic respiratory failure. Appreciate pulmonary input. Continue O2 supplement to keep SAO2 > 90%, duo neb. Pt reporting increased sob today she did receive IVF this hospital stay, will give dose of lasix and monitor    Hypercalcemia. Corrected to 12.7 on admission, now normal 9.5. SPEP and light chains pending. Hypertension. Not well controlled. Continue amlodipine and hydralazine PRN. May need to increase amlodipine or add another BP med       Rheumatoid arthritis ([facility] [address] (2012). Continue pain meds      Generalized anxiety disorder (3/14/2012). Not on meds      CAD (coronary artery disease) (3/26/2012). On ASA but holding this while Hgb is low     GERD (gastroesophageal reflux disease). 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 Supportive care      MIKHAIL (acute kidney injury).  Avoid nephrotic drugs. Continue IVF and monitor    Severe protein-caloric malnutrition. Nutrition consult              Subjective:     Chief Complaint:  F/u sob. Pt notes sob this morning, no worse than previous. Denies cp, abd pain. Objective:     Last 24hrs VS reviewed since prior progress note.  Most recent are:    Visit Vitals    /64 (BP 1 Location: Left arm, BP Patient Position: At rest;Supine)    Pulse 94    Temp 97.8 °F (36.6 °C)    Resp 28    Ht 4' 10\" (1.473 m)    Wt 43.1 kg (95 lb)    SpO2 91%    BMI 19.86 kg/m2     SpO2 Readings from Last 6 Encounters:   06/07/18 91%   04/20/18 99%   10/27/17 97%   02/22/17 99%   02/17/17 95%   02/15/17 97%    O2 Flow Rate (L/min): 2 l/min       Intake/Output Summary (Last 24 hours) at 06/07/18 0946  Last data filed at 06/07/18 0234   Gross per 24 hour   Intake              100 ml   Output             1151 ml   Net            -1051 ml        Physical Exam:    Gen:  Thin, chronically ill appearing, in no acute distress  HEENT:  Pink conjunctivae, PERRL, hearing intact to voice, moist mucous membranes  Neck:  Supple, without masses, thyroid non-tender  Resp:  No accessory muscle use, fine crackles bilaterally  Card:  No murmurs, normal S1, S2 without thrills, bruits, trace peripheral edema  Abd:  Soft, non-tender, non-distended, normoactive bowel sounds are present, no palpable organomegaly and no detectable hernias  Musc:  No cyanosis or clubbing  Skin:  No rashes or ulcers, skin turgor is good  Neuro:  No focal deficits, follows commands appropriately  Psych:  Poor insight, oriented to person, place     Telemetry reviewed:   1st degree AV block at present  __________________________________________________________________  Medications Reviewed: (see below)  Medications:     Current Facility-Administered Medications   Medication Dose Route Frequency    albuterol-ipratropium (DUO-NEB) 2.5 MG-0.5 MG/3 ML  3 mL Nebulization Q2H PRN    ALPRAZolam Stephanie Elliott) tablet 0.25 mg  0.25 mg Oral QID PRN    amLODIPine (NORVASC) tablet 5 mg  5 mg Oral DAILY AFTER BREAKFAST    therapeutic multivitamin (THERAGRAN) tablet 1 Tab  1 Tab Oral DAILY    acetaminophen (TYLENOL) tablet 1,000 mg  1,000 mg Oral QHS    albuterol-ipratropium (DUO-NEB) 2.5 MG-0.5 MG/3 ML  3 mL Nebulization Q4HWA RT    0.9% sodium chloride infusion 250 mL  250 mL IntraVENous PRN    0.9% sodium chloride infusion 250 mL  250 mL IntraVENous PRN    acetaminophen (TYLENOL) tablet 1,000 mg  1,000 mg Oral DAILY PRN    ferrous sulfate tablet 325 mg  1 Tab Oral DAILY WITH BREAKFAST    sodium chloride (NS) flush 5-10 mL  5-10 mL IntraVENous Q8H    sodium chloride (NS) flush 5-10 mL  5-10 mL IntraVENous PRN    hydrALAZINE (APRESOLINE) 20 mg/mL injection 10 mg  10 mg IntraVENous Q2H PRN    0.9% sodium chloride infusion 250 mL  250 mL IntraVENous PRN    pantoprazole (PROTONIX) tablet 40 mg  40 mg Oral ACB        Lab Data Reviewed: (see below)  Lab Review:     Recent Labs      06/07/18   0219  06/06/18   0236  06/05/18   1001   WBC  11.3*  12.6*   --    HGB  10.4*  11.0*  10.2*   HCT  33.1*  35.5  32.1*   PLT  509*  580*   --      Recent Labs      06/07/18   0219  06/06/18   0236  06/04/18   1620   NA  135*  140  138   K  4.0  3.8  4.1   CL  106  108  107   CO2  23  22  25   GLU  88  114*  114*   BUN  26*  25*  28*   CREA  1.05*  1.02  1.04*   CA  8.1*  8.1*  7.8*  7.8*   MG   --   1.9   --    PHOS   --   4.2   --    ALB   --    --   1.4*   TBILI   --    --   0.2   SGOT   --    --   22   ALT   --    --   13     Lab Results   Component Value Date/Time    Glucose (POC) 88 12/16/2016 04:21 PM    Glucose (POC) 106 (H) 12/16/2016 02:05 PM    Glucose (POC) 79 12/16/2016 11:52 AM    Glucose (POC) 82 12/16/2016 07:43 AM    Glucose (POC) 89 12/15/2016 08:49 PM     No results for input(s): PH, PCO2, PO2, HCO3, FIO2 in the last 72 hours. No results for input(s): INR in the last 72 hours.     No lab exists for component: Chelsey Everett  All Micro Results     Procedure Component Value Units Date/Time    URINE CULTURE HOLD SAMPLE [[id number]] Collected:  06/04/18 0146    Order Status:  Completed Specimen:  Urine from Serum Updated:  06/04/18 0159     Urine culture hold         URINE ON HOLD IN MICROBIOLOGY DEPT FOR 3 DAYS. IF UNPRESERVED URINE IS SUBMITTED, IT CANNOT BE USED FOR ADDITIONAL TESTING AFTER 24 HRS, RECOLLECTION WILL BE REQUIRED. I have reviewed notes of prior 24hr.     Other pertinent lab: None    Total time spent with patient: 40 [address] discussed with: Patient, Family, Nursing Staff, Consultant/Specialist and >50% of time spent in counseling and coordination of care    Discussed:  Care Plan    Prophylaxis:  SCD's    Disposition:   PT, OT, RN           ___________________________________________________    Attending Physician: Kamryn Marks MD

## 2018-06-07 NOTE — ACP (ADVANCE CARE PLANNING)
Advance Care Planning (ACP) Provider Note - Comprehensive     Date of ACP Conversation: 06/07/18  Persons included in Conversation:  patient and family  Length of ACP Conversation in minutes:  16 minutes    Authorized Decision Maker (if patient is incapable of making informed decisions): This person is: Other Legally Authorized Decision Maker (e.g. Next of Kin): Saurabh Sanchez [facility]          General ACP for ALL Patients with Decision Making Capacity:   Exploration of values, goals, and preferences if recovery is not expected, even with continued medical treatment in the event of: Imminent death    Review of Existing Advance Directive:  Discussed goals of care and pt wishes with pt and son. For Serious or Chronic Illness:  Discussed further work up required for anemia and bradycardia. pt would need to undergo multiple procedures to establish a diagnosis. Son states further aggressive measures would not be consistent with pt goals of care    Interventions Provided:  Other Treatment or Goals of Care Decisions: the decision was made to forego further procedures ie colonoscopy/egd/bmb/pacemaker evaluation.  Son wishes to focus on comfort and is open to hospice conversations (e.g. continue/discontinue dialysis; elect/forgo surgery)

## 2018-06-07 NOTE — PROGRESS NOTES
Problem: Pressure Injury - Risk of  Goal: *Prevention of pressure injury  Document Taz Scale and appropriate interventions in the flowsheet.    Outcome: Progressing Towards Goal  Pressure Injury Interventions:  Sensory Interventions: Assess changes in LOC, Check visual cues for pain, Discuss PT/OT consult with provider, Float heels    Moisture Interventions: Absorbent underpads, Apply protective barrier, creams and emollients, Internal/External urinary devices, Maintain skin hydration (lotion/cream)    Activity Interventions: Increase time out of bed, Pressure redistribution bed/mattress(bed type), PT/OT evaluation    Mobility Interventions: HOB 30 degrees or less, Pressure redistribution bed/mattress (bed type), PT/OT evaluation    Nutrition Interventions: Document food/fluid/supplement intake    Friction and Shear Interventions: HOB 30 degrees or less, Lift sheet

## 2018-06-07 NOTE — CONSULTS
Palliative Medicine Consult  Alirio: [phone] ([phone])    Patient Name: Leeanne Johnson  YOB: 1932    Date of Initial Consult: 06/07/2018  Reason for Consult: Care decisions  Requesting Provider: Heaven Francisco MD   Primary Care Physician: Rahat Jerez MD     SUMMARY:   Leeanne Johnson is a 80 y.o. with a past history of HTN, XOL, DM, GERD, depression, RA, CAD, ILD, and Alzheimer's dementia, who was admitted on 6/3/2018 from home with a diagnosis of . Patient presented to the ED with complaints of progressive shortness of breath, worse with exertion with cough and fatigue. ED eval revealed tachypnea, hgb 6.4, and chest xray with interval progression of bilateral lower lobe predominant pulmonary fibrosis, with difficulty excluding superimposed acute infiltrates. CT chest with progressive IPF/UIP- lower lobes are largely replaced with fibrosis and honeycombing, with extension into the right middle lobe and lingula, and superimposed CHF with Interstitial edema, trace pericardial and pleural effusions, and cardiomegaly. Patient admitted and transfused 2 units of blood. GI consulted for anemia and recommended endoscopic evaluation but respiratory status/lung disease as well as recurrent episodes of bradycardia making risk of complication high and ultimately not appropriate at this time. HemeOnc also consulted for anemia, abnormal dif, and multiple antibodies. Autoimmune hemolytic anemia work-up negative for hemolysis. Recommended EGD/colonoscopy and bone marrow biopsy once respiratory status is optimized. Ultimately BMB not pursued due to overall clinical picture and lack of utility. Pulmonary consulted for progressive ILD and recommended wean oxygen to determine true requirement and, given age and preexisting dementia, not a good candidate for esbriet/OFEV. During admission, patient found to have bradycardia and developed AVB.  Cardiology consulted and recommended avoiding AV radha blocking agents, repeat echo and, since asymptomatic, there was no indication for pacemaker. Echo performed and showed EF 55-60% with moderate to severe mitral and tricuspid valve regurgitation and severely increased PASP. Current medical issues leading to Palliative Medicine involvement include: Care decisions due to progressive ILD, anemia, cardiac disease. SH; , tells me she was  for [employer]\". Has two sons. She lives with son Keeley Woodard and his wife Margi Marie. Other son, Gay Jaramillo, lives in [place]. PALLIATIVE DIAGNOSES:   1. Impaired memory  2. Physical debility  3. Generalized weakness  4. Hypoalbuminemia  5. Goals of care  6. DNR  7. ACP  8. Progressive ILD  9. Valvular disease       PLAN:   1. Met with patient and introduced the role of Palliative Medicine  2. Patient is alert but can't tell me date, where she is or what she came in for. Patient reoriented easily and requested I call her son for any questions. Son called back and gave permission to speak with his wife Margi Marie about patient and she will be arriving at 12 pm.  3.  Goals of care--> Met with daughter in law Margi Marie and discussed current hospitalization and comorbidities limiting medical interventions. Family has noticed progressive decline in patient's level of functioning over the three years she has lived with them. Also noted that when they traveled for a graduation over [facility] Day weekend, the patient stayed with additional family in [place] that had not seen her in months and thought she had rapidly declined which raised the concern of their ability to continue to care for her in their home. Both Keeley Woodard and Margi Marie work full time and prior to admission, the patient had been attending adult day care 5 days a week, was still able to perform most of her ADLs with assistance, spent most of the time sitting,  and could manage the flight of stairs she had to climb to get to her bedroom on the second floor.  Discussed progressive IPF and likelihood of oxygen depend ance going forward, as PT/OT recommendation for SNF for rehab. Family has decided not to pursue aggressive measures that would be required to complete work-up of anemia (endoscopies, BMB) and given progressive IPF and significant valvular disease, patient would be a candidate to enroll in hospice services as an additional option to support her at discharge. Family wishes for patient to attempt rehab at SNF in hopes that she will regain enough strength to be able to go home at completion. Patient has medicaid long term care benefit and if she fails to make any gains at rehab al,lowing her to return home, they will pursue LTC placement and would enroll in hospice services at that time. Brett Mcelod is familiar with hospice, as she cared for her mother at end of life with the support of hospice services. Reviewed hospice services in detail and informed her to contact hospice agency prior to patient's release from SNF to arrange enrollment. 4. DNR--> Patient completed DDNR in 4/2018  5. ACP--> Brett Agustins states that patient has completed AMD that appoints son Ankita Rinaldi as default agent for healthcare decisions. I have asked that she bring this in for our records. 6. Initial consult note routed to primary continuity provider  7.  Communicated plan of care with: Palliative IDT       GOALS OF CARE / TREATMENT PREFERENCES:     GOALS OF CARE:  Patient/Health Care Proxy Stated Goals: Rehabilitation      TREATMENT PREFERENCES:   Code Status: DNR    Advance Care Planning:  Advance Care Planning 6/3/2018   Patient's Healthcare Decision Maker is: Legal Next of Siria 69   Primary Decision Maker Name Yessica Beltrán   Primary Decision Maker Phone Number [phone]   Primary Decision Maker Relationship to Patient Adult child   Confirm Advance Directive Yes, not on file   Patient Would Like to Complete Advance Directive -       Medical Interventions: Limited additional interventions (DNR)   Other Instructions:   Artificially Administered Nutrition:  (not addressed)     Other:    As far as possible, the palliative care team has discussed with patient / health care proxy about goals of care / treatment preferences for patient. HISTORY:     History obtained from: chart, daughter in law    CHIEF COMPLAINT: Shortness of breath    HPI/SUBJECTIVE:    The patient is:   [] Verbal and participatory  [x] Non-participatory due to: baseline dementia    Patient presented to the ED with complaints of progressive shortness of breath, worse with exertion with cough and fatigue. ED eval revealed tachypnea, hgb 6.4, and chest xray with interval progression of bilateral lower lobe predominant pulmonary fibrosis, with difficulty excluding superimposed acute infiltrates. CT chest with progressive IPF/UIP- lower lobes are largely replaced with fibrosis and honeycombing, with extension into the right middle lobe and lingula, and superimposed CHF with Interstitial edema, trace pericardial and pleural effusions, and cardiomegaly. Patient admitted and transfused 2 units of blood. GI consulted for anemia and recommended endoscopic evaluation but respiratory status/lung disease as well as recurrent episodes of bradycardia making risk of complication high and ultimately not appropriate at this time. HemeOnc also consulted for anemia, abnormal dif, and multiple antibodies. Autoimmune hemolytic anemia work-up negative for hemolysis. Recommended EGD/colonoscopy and bone marrow biopsy once respiratory status is optimized. Ultimately BMB not pursued due to overall clinical picture and lack of utility. Pulmonary consulted for progressive ILD and recommended wean oxygen to determine true requirement and, given age and preexisting dementia, not a good candidate for esbriet/OFEV. During admission, patient found to have bradycardia and developed AVB.  Cardiology consulted and recommended avoiding AV radha blocking agents, repeat echo and, since asymptomatic, there was no indication for pacemaker. Echo performed and showed EF 55-60% with moderate to severe mitral and tricuspid valve regurgitation and severely increased PASP. Clinical Pain Assessment (nonverbal scale for severity on nonverbal patients):   Clinical Pain Assessment  Severity: 0          Duration: for how long has pt been experiencing pain (e.g., 2 days, 1 month, years)  Frequency: how often pain is an issue (e.g., several times per day, once every few days, constant)     FUNCTIONAL ASSESSMENT:     Palliative Performance Scale (PPS):  PPS: 50       PSYCHOSOCIAL/SPIRITUAL SCREENING:     Palliative IDT has assessed this patient for cultural preferences / practices and a referral made as appropriate to needs (Cultural Services, Patient Advocacy, Ethics, etc.)    Advance Care Planning:  Advance Care Planning 6/3/2018   Patient's Healthcare Decision Maker is: Legal Next of Bronwyn Ellis   Primary Decision Maker Name Kacy Marie   Primary Decision Maker Phone Number [phone]   Primary Decision Maker Relationship to Patient Adult child   Confirm Advance Directive Yes, not on file   Patient Would Like to Complete Advance Directive -       Any spiritual / Buddhist concerns:  [] Yes /  [x] No    Caregiver Burnout:  [] Yes /  [x] No /  [] No Caregiver Present      Anticipatory grief assessment:   [x] Normal  / [] Maladaptive       ESAS Anxiety:      ESAS Depression:          REVIEW OF SYSTEMS:     Positive and pertinent negative findings in ROS are noted above in HPI. The following systems were [] reviewed / [x] unable to be reviewed as noted in HPI  Other findings are noted below. Systems: constitutional, ears/nose/mouth/throat, respiratory, gastrointestinal, genitourinary, musculoskeletal, integumentary, neurologic, psychiatric, endocrine. Positive findings noted below.   Modified ESAS Completed by: provider     Drowsiness: 2     Pain: 0           Dyspnea: 2           Stool Occurrence(s): 1        PHYSICAL EXAM:     From RN flowsheet:  Wt Readings from Last 3 Encounters:   06/04/18 95 lb (43.1 kg)   04/20/18 90 lb 6.4 oz (41 kg)   10/27/17 89 lb 3.2 oz (40.5 kg)     Blood pressure 176/79, pulse (!) 101, temperature 98.1 °F (36.7 °C), resp. rate (!) 39, height 4' 10\" (1.473 m), weight 95 lb (43.1 kg), SpO2 94 %.     Pain Scale 1: Numeric (0 - 10)  Pain Intensity 1: 0     Pain Location 1: Arm, Elbow, Leg, Knee  Pain Orientation 1: Left, Right  Pain Description 1: Aching, Sore  Pain Intervention(s) 1: Medication (see MAR)  Last bowel movement, if known:     Constitutional: Frail, resting in bed  Eyes: pupils equal, anicteric  ENMT: no nasal discharge, moist mucous membranes  Cardiovascular: regular rhythm, distal pulses intact, no VAIBHAV  Respiratory: breathing mildly labored, symmetric, diminished BS throughout  Gastrointestinal: soft non-tender, +bowel sounds  Musculoskeletal: no deformity, no tenderness to palpation  Skin: warm, dry  Neurologic: following commands, moving all extremities  Psychiatric: flat affect, no hallucinations  Other:       HISTORY:     Active Problems:    Rheumatoid arthritis (HCC) (2/27/2012)      Mixed hyperlipidemia (3/14/2012)      Generalized anxiety disorder (3/14/2012)      CAD (coronary artery disease) (3/26/2012)      Interstitial lung disease ([address] (4/20/2015)      Hypertension ()      GERD (gastroesophageal reflux disease) ()      Dementia in Alzheimer's disease ()      Hypoxia (6/3/2018)      MIKHAIL (acute kidney injury) ([address] (6/3/2018)      Anemia (6/3/2018)      Melena (6/3/2018)      Pulmonary fibrosis ([address] (6/3/2018)      Past Medical History:   Diagnosis Date    CAD (coronary artery disease)     CAD    Dementia in Alzheimer's disease     Depression     DM type 2 causing vascular disease ([address]     GERD (gastroesophageal reflux disease)     Hx of tuberculosis     as child    Hypercholesterolemia     Hypertension     Osteoporosis, post-menopausal     Rheumatoid arthritis(714.0)       Past Surgical History:   Procedure Laterality Date    HX CATARACT REMOVAL      left with lens implant    HX CORONARY ARTERY BYPASS GRAFT      HX CORONARY STENT PLACEMENT      HX KNEE REPLACEMENT Left     HX MOHS PROCEDURES      right      Family History   Problem Relation Age of Onset    Colon Cancer Mother     Cancer Father     Heart Disease Child             History reviewed, no pertinent family history.   Social History   Substance Use Topics    Smoking status: Never Smoker    Smokeless tobacco: Never Used    Alcohol use No     Allergies   Allergen Reactions    Codeine Nausea and Vomiting      Current Facility-Administered Medications   Medication Dose Route Frequency    albuterol-ipratropium (DUO-NEB) 2.5 MG-0.5 MG/3 ML  3 mL Nebulization Q2H PRN    ALPRAZolam (XANAX) tablet 0.25 mg  0.25 mg Oral QID PRN    amLODIPine (NORVASC) tablet 5 mg  5 mg Oral DAILY AFTER BREAKFAST    therapeutic multivitamin (THERAGRAN) tablet 1 Tab  1 Tab Oral DAILY    acetaminophen (TYLENOL) tablet 1,000 mg  1,000 mg Oral QHS    albuterol-ipratropium (DUO-NEB) 2.5 MG-0.5 MG/3 ML  3 mL Nebulization Q4HWA RT    0.9% sodium chloride infusion 250 mL  250 mL IntraVENous PRN    0.9% sodium chloride infusion 250 mL  250 mL IntraVENous PRN    acetaminophen (TYLENOL) tablet 1,000 mg  1,000 mg Oral DAILY PRN    ferrous sulfate tablet 325 mg  1 Tab Oral DAILY WITH BREAKFAST    sodium chloride (NS) flush 5-10 mL  5-10 mL IntraVENous Q8H    sodium chloride (NS) flush 5-10 mL  5-10 mL IntraVENous PRN    hydrALAZINE (APRESOLINE) 20 mg/mL injection 10 mg  10 mg IntraVENous Q2H PRN    0.9% sodium chloride infusion 250 mL  250 mL IntraVENous PRN    pantoprazole (PROTONIX) tablet 40 mg  40 mg Oral ACB          LAB AND IMAGING FINDINGS:     Lab Results   Component Value Date/Time    WBC 11.3 (H) 06/07/2018 02:19 AM    HGB 10.4 (L) 06/07/2018 02:19 AM    PLATELET 211 (H) 06/07/2018 02:19 AM     Lab Results   Component Value Date/Time    Sodium 135 (L) 06/07/2018 02:19 AM    Potassium 4.0 06/07/2018 02:19 AM    Chloride 106 06/07/2018 02:19 AM    CO2 23 06/07/2018 02:19 AM    BUN 26 (H) 06/07/2018 02:19 AM    Creatinine 1.05 (H) 06/07/2018 02:19 AM    Calcium 8.1 (L) 06/07/2018 02:19 AM    Magnesium 1.9 06/06/2018 02:36 AM    Phosphorus 4.2 06/06/2018 02:36 AM      Lab Results   Component Value Date/Time    AST (SGOT) 22 06/04/2018 04:20 PM    Alk. phosphatase 104 06/04/2018 04:20 PM    Protein, total 8.6 (H) 06/04/2018 04:20 PM    Protein, total 9.2 (H) 06/04/2018 04:20 PM    Albumin 1.4 (L) 06/04/2018 04:20 PM    Globulin 7.8 (H) 06/04/2018 04:20 PM     Lab Results   Component Value Date/Time    INR 1.1 02/26/2012 11:55 PM    Prothrombin time 11.3 (H) 02/26/2012 11:55 PM    aPTT 30.1 02/26/2012 11:55 PM      Lab Results   Component Value Date/Time    Iron 24 (L) 06/03/2018 11:56 PM    TIBC 120 (L) 06/03/2018 11:56 PM    Iron % saturation 20 06/03/2018 11:56 PM    Ferritin 324 (H) 06/04/2018 12:43 AM      No results found for: PH, PCO2, PO2  No components found for: [place]   Lab Results   Component Value Date/Time    CK 58 09/04/2015 12:15 PM    CK - MB <0.5 (L) 09/04/2015 12:15 PM                Total time:  70 min  Counseling / coordination time, spent as noted above: 60 min  > 50% counseling / coordination?: yes    Prolonged service was provided for  []30 min   []75 min in face to face time in the presence of the patient, spent as noted above. Time Start:   Time End:   Note: this can only be billed with 79098 (initial) or 03728 (follow up). If multiple start / stop times, list each separately.

## 2018-06-07 NOTE — PROGRESS NOTES
Problem: Mobility Impaired (Adult and Pediatric)  Goal: *Acute Goals and Plan of Care (Insert Text)  Physical Therapy Goals  Initiated 6/7/2018  1. Patient will move from supine to sit and sit to supine , scoot up and down and roll side to side in bed with independence within 7 day(s). 2.  Patient will transfer from bed to chair and chair to bed with supervision/set-up using the least restrictive device within 7 day(s). 3.  Patient will perform sit to stand with supervision/set-up within 7 day(s). 4.  Patient will ambulate with minimal assistance/contact guard assist for 200 feet with the least restrictive device within 7 day(s). 5.  Patient will ascend/descend 4 stairs with  handrail(s) with minimal assistance/contact guard assist within 7 day(s). physical Therapy EVALUATION  Patient: Sherman Suazo (16 y.o. female)  Date: 6/7/2018  Primary Diagnosis: Hypoxia  Pulmonary fibrosis ([facility] [address]        Precautions: fall       ASSESSMENT :  Based on the objective data described below, the patient presents with generalized weakness and debility. Min assist x 2, sit to stand mod assist x 2, sitting balance fair  Standing balance poor. ambulate with hand held assist towards the recliner mod assist x 2, slow pace gait. OOB to chair as tolerated educate and instructed to do some active range of motion exercise on both LE all planes. Patient desaturated on 2 liters with activity. Activated chair alarm and notified nurse who agreed to monitor and assist back to bed when ready. Patient will benefit from skilled intervention to address the above impairments.   Patients rehabilitation potential is considered to be Excellent  Factors which may influence rehabilitation potential include:   []         None noted  []         Mental ability/status  [x]         Medical condition  [x]         Home/family situation and support systems  [x]         Safety awareness  []         Pain tolerance/management  []         Other: PLAN :  Recommendations and Planned Interventions:  [x]           Bed Mobility Training             []    Neuromuscular Re-Education  [x]           Transfer Training                   []    Orthotic/Prosthetic Training  [x]           Gait Training                         []    Modalities  [x]           Therapeutic Exercises           []    Edema Management/Control  [x]           Therapeutic Activities            []    Patient and Family Training/Education  []           Other (comment):    Frequency/Duration: Patient will be followed by physical therapy  5 times a week to address goals. Discharge Recommendations: [facility]  Further Equipment Recommendations for Discharge: TBD     SUBJECTIVE:   Patient stated ok.     OBJECTIVE DATA SUMMARY:   HISTORY:    Past Medical History:   Diagnosis Date    CAD (coronary artery disease)     CAD    Dementia in Alzheimer's disease     Depression     DM type 2 causing vascular disease ([facility] [address]     GERD (gastroesophageal reflux disease)     Hx of tuberculosis     as child    Hypercholesterolemia     Hypertension     Osteoporosis, post-menopausal     Rheumatoid arthritis(714.0)      Past Surgical History:   Procedure Laterality Date    HX CATARACT REMOVAL      left with lens implant    HX CORONARY ARTERY BYPASS GRAFT      HX CORONARY STENT PLACEMENT      HX KNEE REPLACEMENT Left     HX MOHS PROCEDURES      right     Prior Level of Function/Home Situation: modified independent without assistive device and lives with son in a single level house  Personal factors and/or comorbidities impacting plan of care:   Home Situation  Home Environment: Private residence  One/Two Story Residence: Two story  # of Interior Steps: 10  Living Alone: No  Support Systems: Child(melinda)  Patient Expects to be Discharged to[de-identified] Unknown  Current DME Used/Available at Home: None  Tub or Shower Type: Tub/Shower combination    EXAMINATION/PRESENTATION/DECISION MAKING:   Critical Behavior:  Neurologic State: Alert  Orientation Level: Oriented to person, Oriented to place, Oriented to situation, Disoriented to time  Cognition: Follows commands, Decreased attention/concentration  Safety/Judgement: Awareness of environment  Hearing: Auditory  Auditory Impairment: None    Range Of Motion:  AROM: Within functional limits           PROM: Within functional limits           Strength:    Strength: Generally decreased, functional                    Tone & Sensation:                                  Coordination:  Coordination: Generally decreased, functional  Vision:   Tracking: Able to track stimulus in all quadrants w/o difficulty  Diplopia: No  Acuity: Within Defined Limits  Functional Mobility:  Bed Mobility:  Rolling: Minimum assistance;Assist x2; Additional time  Supine to Sit: Minimum assistance;Assist x2; Additional time  Sit to Supine:  (pt remained up at end of tx)  Scooting: Minimum assistance;Assist x1;Additional time  Transfers:  Sit to Stand: Moderate assistance;Assist x2  Stand to Sit: Moderate assistance;Assist x2  Stand Pivot Transfers: Assist x2     Bed to Chair: Moderate assistance;Assist x2; Additional time (stand-pivot transfer)              Balance:   Sitting: Impaired;High guard  Sitting - Static: Good (unsupported)  Sitting - Dynamic: Fair (occasional)  Standing: Impaired;Pull to stand; With support  Standing - Static: Poor  Standing - Dynamic : Poor  Ambulation/Gait Training:  Distance (ft): 2 Feet (ft)  Assistive Device: Gait belt;Walker, rolling (hand held assist towards the chair)  Ambulation - Level of Assistance: Moderate assistance; Additional time;Assist x2     Gait Description (WDL): Exceptions to WDL  Gait Abnormalities: Path deviations; Step to gait        Base of Support: Narrowed     Speed/Sara: Fluctuations                    Therapeutic Exercises:    Instructed patient to continue active range of motion exercise on both legs while up on chair or on bed. Functional Measure:  Barthel Index:    Bathin  Bladder: 0  Bowels: 5  Groomin  Dressin  Feedin  Mobility: 0  Stairs: 0  Toilet Use: 0  Transfer (Bed to Chair and Back): 5  Total: 15       Barthel and G-code impairment scale:  Percentage of impairment CH  0% CI  1-19% CJ  20-39% CK  40-59% CL  60-79% CM  80-99% CN  100%   Barthel Score 0-100 100 99-80 79-60 59-40 20-39 1-19   0   Barthel Score 0-20 20 17-19 13-16 9-12 5-8 1-4 0      The Barthel ADL Index: Guidelines  1. The index should be used as a record of what a patient does, not as a record of what a patient could do. 2. The main aim is to establish degree of independence from any help, physical or verbal, however minor and for whatever reason. 3. The need for supervision renders the patient not independent. 4. A patient's performance should be established using the best available evidence. Asking the patient, friends/relatives and nurses are the usual sources, but direct observation and common sense are also important. However direct testing is not needed. 5. Usually the patient's performance over the preceding 24-48 hours is important, but occasionally longer periods will be relevant. 6. Middle categories imply that the patient supplies over 50 per cent of the effort. 7. Use of aids to be independent is allowed. Leigh Ann Templeton, Barthel, DAilynW. (4329). Functional evaluation: the Barthel Index. [facility] [address] [facility] (14)2. Jessica Christianson jessee [facility], J.J.MAilynF, Luz Boles., [place], [address] [place] [address] (). Measuring the change indisability after inpatient rehabilitation; comparison of the responsiveness of the Barthel Index and Functional [place] Measure. Journal of Neurology, Neurosurgery, and Psychiatry, 66(4), 140-393. Stephen Yun, N.J.A, Bruna Swift,  W.J.M, & German Doe M.A. (2004.) Assessment of post-stroke quality of life in cost-effectiveness studies: The usefulness of the Barthel Index and the EuroQoL-5D.  Quality of Life Research, 13, 578-74       G codes: In compliance with CMSs Claims Based Outcome Reporting, the following G-code set was chosen for this patient based on their primary functional limitation being treated: The outcome measure chosen to determine the severity of the functional limitation was the barthel with a score of 15/100 which was correlated with the impairment scale. ? Mobility - Walking and Moving Around:     - CURRENT STATUS: CM - 80%-99% impaired, limited or restricted    - GOAL STATUS: CL - 60%-79% impaired, limited or restricted    - D/C STATUS:  ---------------To be determined---------------      Physical Therapy Evaluation Charge Determination   History Examination Presentation Decision-Making   MEDIUM  Complexity : 1-2 comorbidities / personal factors will impact the outcome/ POC  MEDIUM Complexity : 3 Standardized tests and measures addressing body structure, function, activity limitation and / or participation in recreation  MEDIUM Complexity : Evolving with changing characteristics  Other outcome measures barthel  MEDIUM      Based on the above components, the patient evaluation is determined to be of the following complexity level: MEDIUM    Pain:  Pain Scale 1: Numeric (0 - 10)  Pain Intensity 1: 0              Activity Tolerance:   Good. Please refer to the flowsheet for vital signs taken during this treatment. After treatment:   [x]         Patient left in no apparent distress sitting up in chair  []         Patient left in no apparent distress in bed  [x]         Call bell left within reach  [x]         Nursing notified  []         Caregiver present  []         Bed alarm activated    COMMUNICATION/EDUCATION:   The patients plan of care was discussed with: Occupational Therapist and Registered Nurse. [x]         Fall prevention education was provided and the patient/caregiver indicated understanding.   [x]         Patient/family have participated as able in goal setting and plan of care. [x]         Patient/family agree to work toward stated goals and plan of care. []         Patient understands intent and goals of therapy, but is neutral about his/her participation. []         Patient is unable to participate in goal setting and plan of care. Thank you for this referral.  Shannon Castellanos, PT ,[facility].    Time Calculation: 25 mins

## 2018-06-07 NOTE — ROUTINE PROCESS
Bedside shift change report given to Aleja (oncoming nurse) by Adam Luke (offgoing nurse). Report included the following information SBAR, Kardex, Intake/Output, MAR, Accordion and Recent Results.

## 2018-06-07 NOTE — PROGRESS NOTES
Occupational Therapy Goals  Initiated 6/7/2018  1. Patient will perform grooming with supervision/set-up within 7 day(s). 2.  Patient will perform upper body dressing and bathing with supervision/set-up within 7 day(s). 3.  Patient will perform lower body dressing and bathing with moderate assistance using AE PRN within 7 day(s). 4.  Patient will perform toilet transfers to [facility] with moderate assistance x1 within 7 day(s). 5.  Patient will perform all aspects of toileting with moderate assistance within 7 day(s). 6.  Patient will participate in upper extremity therapeutic exercise/activities with supervision/set-up for 10 minutes within 7 day(s). 7.  Patient will utilize energy conservation techniques during functional activities with verbal cues within 7 day(s). Occupational Therapy EVALUATION  Patient: Price Hylton (72 y.o. female)  Date: 6/7/2018  Primary Diagnosis: Hypoxia  Pulmonary fibrosis ([facility] [address]        Precautions: fall       ASSESSMENT :  Based on the objective data described below, the patient presents with decreased activity tolerance following admission on 6/3 for SOB/hypoxia. Patient with history significant for RA, pulmonary fibrosis, and dementia all impacting independence and safety with daily routine. Patient lives with her son who was not present today however present earlier this AM.  Patient reports she is independent with ADLs and does not require use of AE for mobility/transfers PTA. Patient unable to recall if she required home O2 at baseline or if she owns any AE; She requested confirmation with son if needed. Today, patient required min A x2 for bed mobility and mod A x2 for stand-pivot transfer to the chair with hand held assist.  Patient with c/o R knee and R hand pain likely d/t RA. She declined use of RW d/t hand pain therefore hand held assist utilized for stand-pivot transfer. Patient currently requires min to mod A for UB ADLs and max to total A for LB ADLs.   Patient instructed on pursed lip breathing techniques, adaptive ADL techniques, and safe transfer techniques. Patient received on 3L O2 with SpO2 dropping to 87% when attempting to wean to 2L O2 therefore remained on 3L throughout activity. Patient would benefit from continued skilled OT to progress towards goals and improve overall independence. Patient will benefit from skilled intervention to address the above impairments. Patients rehabilitation potential is considered to be Fair  Factors which may influence rehabilitation potential include:   []             None noted  []             Mental ability/status  [x]             Medical condition  []             Home/family situation and support systems  []             Safety awareness  []             Pain tolerance/management  []             Other:      PLAN :  Recommendations and Planned Interventions:  [x]               Self Care Training                  [x]        Therapeutic Activities  [x]               Functional Mobility Training    []        Cognitive Retraining  [x]               Therapeutic Exercises           [x]        Endurance Activities  [x]               Balance Training                   []        Neuromuscular Re-Education  []               Visual/Perceptual Training     [x]   Home Safety Training  [x]               Patient Education                 [x]        Family Training/Education  []               Other (comment):    Frequency/Duration: Patient will be followed by occupational therapy 5 times a week to address goals. Discharge Recommendations: Skilled Nursing Facility  Further Equipment Recommendations for Discharge: none at this time     SUBJECTIVE:   Patient stated I feel so weak and tired.     OBJECTIVE DATA SUMMARY:   HISTORY:   Past Medical History:   Diagnosis Date    CAD (coronary artery disease)     CAD    Dementia in Alzheimer's disease     Depression     DM type 2 causing vascular disease ([facility] [address]     GERD (gastroesophageal reflux disease)     Hx of tuberculosis     as child    Hypercholesterolemia     Hypertension     Osteoporosis, post-menopausal     Rheumatoid arthritis(714.0)      Past Surgical History:   Procedure Laterality Date    HX CATARACT REMOVAL      left with lens implant    HX CORONARY ARTERY BYPASS GRAFT      HX CORONARY STENT PLACEMENT      HX KNEE REPLACEMENT Left     HX MOHS PROCEDURES      right       Prior Level of Function/Environment/Context: Patient lives with her son. See assessment section for PLOF information reported by pt. Home Situation  Home Environment: Private residence  One/Two Story Residence: Two story  # of Interior Steps: 10  Living Alone: No  Support Systems: Child(melinda)  Patient Expects to be Discharged to[de-identified] Unknown  Current DME Used/Available at Home: None  Tub or Shower Type: Tub/Shower combination    Hand dominance: Right    EXAMINATION OF PERFORMANCE DEFICITS:  Cognitive/Behavioral Status:  Neurologic State: Alert  Orientation Level: Oriented to person;Oriented to place;Oriented to situation;Disoriented to time  Cognition: Follows commands;Decreased attention/concentration  Perception: Appears intact  Perseveration: No perseveration noted  Safety/Judgement: Awareness of environment    Skin: Intact in the uppers    Edema: None noted in the uppers    Hearing:   Auditory  Auditory Impairment: None    Vision/Perceptual:    Tracking: Able to track stimulus in all quadrants w/o difficulty    Diplopia: No    Acuity: Within Defined Limits         Range of Motion:  B shoulder flexion limited to ~110 degrees otherwise WDL    Strength:  Decreased but functional in the uppers    Coordination:  Fine Motor Skills-Upper: Left Impaired;Right Impaired    Gross Motor Skills-Upper: Left Impaired;Right Impaired    Tone & Sensation:  Tone: normal  Sensation: intact    Balance:  Sitting: Impaired  Sitting - Static: Good (unsupported)  Sitting - Dynamic: Fair (occasional)  Standing: Impaired  Standing - Static: Poor  Standing - Dynamic : Poor    Functional Mobility and Transfers for ADLs:  Bed Mobility:  Rolling: Minimum assistance;Assist x2; Additional time  Supine to Sit: Minimum assistance;Assist x2; Additional time  Sit to Supine:  (pt remained up at end of tx)  Scooting: Minimum assistance;Assist x1;Additional time    Transfers:  Sit to Stand: Moderate assistance;Assist x2  Stand to Sit: Moderate assistance;Assist x2  Bed to Chair: Moderate assistance;Assist x2; Additional time (stand-pivot transfer)  Toilet Transfer : Moderate assistance;Assist x2; Additional time    ADL Assessment:  Feeding: Minimum assistance    Oral Facial Hygiene/Grooming: Minimum assistance    Bathing: Maximum assistance    Upper Body Dressing: Moderate assistance    Lower Body Dressing: Total assistance    Toileting: Total assistance    Cognitive Retraining  Safety/Judgement: Awareness of environment    Patient instructed and indicated understanding energy conservation techniques to increase independence and safety during all ADLs for end goal of returning home. Patient encouraged to use energy conservation techniques during ADLs to increase participation in life activities patient prefers, to ensure more frequent good days. If having a bad day, evaluate tasks completed day before and re-plan how to save energy to complete same task. Patient confirmed understanding of energy conservation techniques and demonstrated understanding during activity. Provided verbal cuing for education for breathing techniques including pursed lip breathing techniques (PLB) and circulatory breathing. Additionally, patient was educated on energy conservation techniques, including how they specifically apply to functional activities; This includes pacing, rest breaks, and planning. Patient with good verbal understanding however needing additional cuing through out tasks to use techniques. Additional time needed during tasks.       Functional Measure:  Barthel Index:    Bathin  Bladder: 0  Bowels: 5  Groomin  Dressin  Feedin  Mobility: 0  Stairs: 0  Toilet Use: 0  Transfer (Bed to Chair and Back): 5  Total: 15       Barthel and G-code impairment scale:  Percentage of impairment CH  0% CI  1-19% CJ  20-39% CK  40-59% CL  60-79% CM  80-99% CN  100%   Barthel Score 0-100 100 99-80 79-60 59-40 20-39 1-19   0   Barthel Score 0-20 20 17-19 13-16 9-12 5-8 1-4 0      The Barthel ADL Index: Guidelines  1. The index should be used as a record of what a patient does, not as a record of what a patient could do. 2. The main aim is to establish degree of independence from any help, physical or verbal, however minor and for whatever reason. 3. The need for supervision renders the patient not independent. 4. A patient's performance should be established using the best available evidence. Asking the patient, friends/relatives and nurses are the usual sources, but direct observation and common sense are also important. However direct testing is not needed. 5. Usually the patient's performance over the preceding 24-48 hours is important, but occasionally longer periods will be relevant. 6. Middle categories imply that the patient supplies over 50 per cent of the effort. 7. Use of aids to be independent is allowed. Anurag Palencia., BarthelMAYRA. (3249). Functional evaluation: the Barthel Index. [address] [facility] (14)2. SHERI Stevens, Tapan Danielle, Mindi Davis., [facility], [address] [place] [address] (). Measuring the change indisability after inpatient rehabilitation; comparison of the responsiveness of the Barthel Index and Functional [place] Measure. Journal of Neurology, Neurosurgery, and Psychiatry, 66(4), 535-382. Yazmin Lennon, N.J.A, Bruna Swift  WCHRISTINA.LELA, & Edson Denson MAilynA. (2004.) Assessment of post-stroke quality of life in cost-effectiveness studies: The usefulness of the Barthel Index and the EuroQoL-5D.  [facility], 13, 135-81 G codes: In compliance with CMSs Claims Based Outcome Reporting, the following G-code set was chosen for this patient based on their primary functional limitation being treated: The outcome measure chosen to determine the severity of the functional limitation was the Barthel Index with a score of 15/100 which was correlated with the impairment scale. ? Self Care:     - CURRENT STATUS: CM - 80%-99% impaired, limited or restricted    - GOAL STATUS: CL - 60%-79% impaired, limited or restricted    - D/C STATUS:  ---------------To be determined---------------     Occupational Therapy Evaluation Charge Determination   History Examination Decision-Making   LOW Complexity : Brief history review  LOW Complexity : 1-3 performance deficits relating to physical, cognitive , or psychosocial skils that result in activity limitations and / or participation restrictions  LOW Complexity : No comorbidities that affect functional and no verbal or physical assistance needed to complete eval tasks       Based on the above components, the patient evaluation is determined to be of the following complexity level: LOW   Pain:  Pain Scale 1: Numeric (0 - 10)  Pain Intensity 1: 0              Activity Tolerance:   Patient tolerated eval well. Please refer to the flowsheet for vital signs taken during this treatment. After treatment:   [x] Patient left in no apparent distress sitting up in chair  [] Patient left in no apparent distress in bed  [x] Call bell left within reach  [x] Nursing notified  [] Caregiver present  [x] Chair alarm activated    COMMUNICATION/EDUCATION:   The patients plan of care was discussed with: Physical Therapist, Registered Nurse and patient. .  [x] Home safety education was provided and the patient/caregiver indicated understanding. [x] Patient/family have participated as able in goal setting and plan of care.   [x] Patient/family agree to work toward stated goals and plan of care.  [] Patient understands intent and goals of therapy, but is neutral about his/her participation. [] Patient is unable to participate in goal setting and plan of care. This patients plan of care is appropriate for delegation to MICHELLE.     Thank you for this referral.  Melissa Kay, OTR/L  Time Calculation: 20 mins

## 2018-06-07 NOTE — PROGRESS NOTES
Assumed care of pt from [facility]. 1750 pt now persistently tachycardic, tachypnic trending up over the course of the day. Dr Gloria Tavarez notified. Telephone order taken for one time dose lasix 40 mg, give prn xanax    Bedside and Verbal shift change report given to Lili (oncoming nurse) by Bean (offgoing nurse). Report included the following information SBAR, Kardex, Procedure Summary, Intake/Output, MAR, Recent Results and Cardiac Rhythm sinus tach.

## 2018-06-07 NOTE — PROGRESS NOTES
Spiritual Care Assessment/Progress Note  [address]      NAME: Lizzie Tirado      MRN: [id number]  AGE: 80 y.o.  SEX: female  [place] Affiliation: Anabaptist   Language: English     6/7/2018     Total Time (in minutes): 33     Spiritual Assessment begun in [facility] [address] through conversation with:         [x]Patient        [] Family    [] Friend(s)        Reason for Consult: Palliative Care, Initial/Spiritual Assessment     Spiritual beliefs: (Please include comment if needed)     [x] Identifies with a watson tradition: Suzy Ellis     [] Supported by a watson community:      [] Claims no spiritual orientation:      [] Seeking spiritual identity:           [] Adheres to an individual form of spirituality:      [] Not able to assess:                     Identified resources for coping:      [x] Prayer                               [] Music                  [] Guided Imagery     [] Family/friends                 [] Pet visits     [] Devotional reading                         [] Unknown     [] Other:                                            Interventions offered during this visit: (See comments for more details)    Patient Interventions: Affirmation of emotions/emotional suffering, Affirmation of watson, Iconic (affirming the presence of God/Higher Power), Initial/Spiritual assessment, patient floor, Prayer (actual), Prayer (assurance of)           Plan of Care:     [] Support spiritual and/or cultural needs    [] Support AMD and/or advance care planning process      [] Support grieving process   [] Coordinate Rites and/or Rituals    [] Coordination with community clergy   [] No spiritual needs identified at this time   [] Detailed Plan of Care below (See Comments)  [] Make referral to Music Therapy  [] Make referral to Pet Therapy     [] Make referral to Addiction services  [] Make referral to [facility]  [] Make referral to Spiritual Care Partner  [] No future visits requested        [x] Follow up visits as needed     Comments: Initial spiritual assessment in [facility]. Miss Lucinda Giraldo indicated she needed help to move. Advised staff to request her RN to move Miss Lucinda Giraldo. Miss Lucinda Giraldo asked where she was and how long she has been here. I informed her she was at [facility] and her about 3 days. She had moved her nasal canula assisted moving it back to where it belongs and practiced breathing. ... In with the [facility]. ... Out with the rest... She shared she has strong watson and loves prayer and God. Provided continued spiritual presence and support as RN came in to provided continued care. Miss Lucinda Giraldo requested prayer. Provided spiritual presence and prayer, sang AMEN. Miss Lucinda Giraldo shared she would like [place] to return when ever they are able to return. Chaplains will follow as able and/or needed.   Visited by: Qiana Costello [address] [facility] Binta Burden ([id number])

## 2018-06-08 NOTE — PROGRESS NOTES
0000- received patient with no complaints sleeping comfortably    0200- repositioned patient and changed pulse ox- sats 95 on 3 L NC

## 2018-06-08 NOTE — CONSULTS
Palliative Medicine Consult  Alirio: [phone] ([phone])    Patient Name: Jim Morgan  YOB: 1932    Date of Initial Consult: 06/07/2018  Reason for Consult: Care decisions  Requesting Provider: Pa Patton MD   Primary Care Physician: Fide Bonner MD     SUMMARY:   Jim Morgan is a 80 y.o. with a past history of HTN, XOL, DM, GERD, depression, RA, CAD, ILD, and Alzheimer's dementia, who was admitted on 6/3/2018 from home with a diagnosis of . Patient presented to the ED with complaints of progressive shortness of breath, worse with exertion with cough and fatigue. ED eval revealed tachypnea, hgb 6.4, and chest xray with interval progression of bilateral lower lobe predominant pulmonary fibrosis, with difficulty excluding superimposed acute infiltrates. CT chest with progressive IPF/UIP- lower lobes are largely replaced with fibrosis and honeycombing, with extension into the right middle lobe and lingula, and superimposed CHF with Interstitial edema, trace pericardial and pleural effusions, and cardiomegaly. Patient admitted and transfused 2 units of blood. GI consulted for anemia and recommended endoscopic evaluation but respiratory status/lung disease as well as recurrent episodes of bradycardia making risk of complication high and ultimately not appropriate at this time. HemeOnc also consulted for anemia, abnormal dif, and multiple antibodies. Autoimmune hemolytic anemia work-up negative for hemolysis. Recommended EGD/colonoscopy and bone marrow biopsy once respiratory status is optimized. Ultimately BMB not pursued due to overall clinical picture and lack of utility. Pulmonary consulted for progressive ILD and recommended wean oxygen to determine true requirement and, given age and preexisting dementia, not a good candidate for esbriet/OFEV. During admission, patient found to have bradycardia and developed AVB.  Cardiology consulted and recommended avoiding AV radha blocking agents, repeat echo and, since asymptomatic, there was no indication for pacemaker. Echo performed and showed EF 55-60% with moderate to severe mitral and tricuspid valve regurgitation and severely increased PASP. Current medical issues leading to Palliative Medicine involvement include: Care decisions due to progressive ILD, anemia, cardiac disease. SH; , tells me she was  for [employer]\". Has two sons. She lives with son Felipe Franklin and his wife Brandie Tompkins. Other son, Amparo Oliver, lives in [place]. Interim History:  6/8--> Continues with shortness of breath. Continues on 2L supplemental oxygen. PALLIATIVE DIAGNOSES:   1. Impaired memory  2. Physical debility  3. Generalized weakness  4. Hypoalbuminemia  5. Goals of care  6. DNR  7. ACP  8. Progressive ILD  9. Valvular disease       PLAN:   1. Met with patient and introduced the role of Palliative Medicine  2. Patient is alert, states that she had a \"bad night\", is looking forward to discharge today. 3.  Goals of care--> (6/7) Met with daughter in law Brandie Tompkins and discussed current hospitalization and comorbidities limiting medical interventions. Family has noticed progressive decline in patient's level of functioning over the three years she has lived with them. Also noted that when they traveled for a graduation over [facility] Day weekend, the patient stayed with additional family in [place] that had not seen her in months and thought she had rapidly declined which raised the concern of their ability to continue to care for her in their home. Both Felipe Franklin and Brandie Tompkins work full time and prior to admission, the patient had been attending adult day care 5 days a week, was still able to perform most of her ADLs with assistance, spent most of the time sitting,  and could manage the flight of stairs she had to climb to get to her bedroom on the second floor.  Discussed progressive IPF and likelihood of oxygen depend ance going forward, as PT/OT recommendation for SNF for rehab. Family has decided not to pursue aggressive measures that would be required to complete work-up of anemia (endoscopies, BMB) and given progressive IPF and significant valvular disease, patient would be a candidate to enroll in hospice services as an additional option to support her at discharge. Family wishes for patient to attempt rehab at SNF in hopes that she will regain enough strength to be able to go home at completion. Patient has medicaid long term care benefit and if she fails to make any gains at rehab allowing her to return home, they will pursue LTC placement and would enroll in hospice services at that time. Angela Roblero is familiar with hospice, as she cared for her mother at end of life with the support of hospice services. Reviewed hospice services in detail and informed her to contact hospice agency prior to patient's release from SNF to arrange enrollment. (6/8) Patient being discharged today to [facility]. Spoke with daughter in law at length and reiterated options discussed yesterday and she was requesting information on ensuring medicaid bed for when skilled benefit is exhausted. Spoke to [employer] and she will contact daughter for any additional questions she can help answer. 4. DNR--> Patient completed DDNR in 4/2018. (6/08) Copy of DDNR printed out and placed on chart to go with patient to [facility] SNF. 5. ACP--> Angela Roblero states that patient has completed AMD that appoints justice العراقي as default agent for healthcare decisions. I have asked that she bring this in for our records. 6. Initial consult note routed to primary continuity provider  7.  Communicated plan of care with: Palliative IDT       GOALS OF CARE / TREATMENT PREFERENCES:     GOALS OF CARE:  Patient/Health Care Proxy Stated Goals: Rehabilitation      TREATMENT PREFERENCES:   Code Status: DNR    Advance Care Planning:  Advance Care Planning 6/3/2018   Patient's Healthcare Decision Maker is: Legal Next of Siria 69   Primary Decision Maker Name Mariana Nurse   Primary Decision Maker Phone Number [phone]   Primary Decision Maker Relationship to Patient Adult child   Confirm Advance Directive Yes, not on file   Patient Would Like to Complete Advance Directive -       Medical Interventions: Limited additional interventions (DNR)   Other Instructions:   Artificially Administered Nutrition:  (not addressed)     Other:    As far as possible, the palliative care team has discussed with patient / health care proxy about goals of care / treatment preferences for patient. HISTORY:     History obtained from: chart, daughter in law    CHIEF COMPLAINT: Shortness of breath    HPI/SUBJECTIVE:    The patient is:   [] Verbal and participatory  [x] Non-participatory due to: baseline dementia    Patient presented to the ED with complaints of progressive shortness of breath, worse with exertion with cough and fatigue. ED eval revealed tachypnea, hgb 6.4, and chest xray with interval progression of bilateral lower lobe predominant pulmonary fibrosis, with difficulty excluding superimposed acute infiltrates. CT chest with progressive IPF/UIP- lower lobes are largely replaced with fibrosis and honeycombing, with extension into the right middle lobe and lingula, and superimposed CHF with Interstitial edema, trace pericardial and pleural effusions, and cardiomegaly. Patient admitted and transfused 2 units of blood. GI consulted for anemia and recommended endoscopic evaluation but respiratory status/lung disease as well as recurrent episodes of bradycardia making risk of complication high and ultimately not appropriate at this time. HemeOnc also consulted for anemia, abnormal dif, and multiple antibodies. Autoimmune hemolytic anemia work-up negative for hemolysis. Recommended EGD/colonoscopy and bone marrow biopsy once respiratory status is optimized.  Ultimately BMB not pursued due to overall clinical picture and lack of utility. Pulmonary consulted for progressive ILD and recommended wean oxygen to determine true requirement and, given age and preexisting dementia, not a good candidate for esbriet/OFEV. During admission, patient found to have bradycardia and developed AVB. Cardiology consulted and recommended avoiding AV radha blocking agents, repeat echo and, since asymptomatic, there was no indication for pacemaker. Echo performed and showed EF 55-60% with moderate to severe mitral and tricuspid valve regurgitation and severely increased PASP. Interim History:  6/8--> Continues with shortness of breath. Continues on 2L supplemental oxygen.     Clinical Pain Assessment (nonverbal scale for severity on nonverbal patients):   Clinical Pain Assessment  Severity: 0          Duration: for how long has pt been experiencing pain (e.g., 2 days, 1 month, years)  Frequency: how often pain is an issue (e.g., several times per day, once every few days, constant)     FUNCTIONAL ASSESSMENT:     Palliative Performance Scale (PPS):  PPS: 50       PSYCHOSOCIAL/SPIRITUAL SCREENING:     Palliative IDT has assessed this patient for cultural preferences / practices and a referral made as appropriate to needs (Cultural Services, Patient Advocacy, Ethics, etc.)    Advance Care Planning:  Advance Care Planning 6/3/2018   Patient's Healthcare Decision Maker is: Legal Next of Siria 69   Primary Decision Maker Name Ronda Justice   Primary Decision Maker Phone Number [phone]   Primary Decision Maker Relationship to Patient Adult child   Confirm Advance Directive Yes, not on file   Patient Would Like to Complete Advance Directive -       Any spiritual / Oriental orthodox concerns:  [] Yes /  [x] No    Caregiver Burnout:  [] Yes /  [x] No /  [] No Caregiver Present      Anticipatory grief assessment:   [x] Normal  / [] Maladaptive       ESAS Anxiety:      ESAS Depression:          REVIEW OF SYSTEMS:     Positive and pertinent negative findings in ROS are noted above in HPI. The following systems were [] reviewed / [x] unable to be reviewed as noted in HPI  Other findings are noted below. Systems: constitutional, ears/nose/mouth/throat, respiratory, gastrointestinal, genitourinary, musculoskeletal, integumentary, neurologic, psychiatric, endocrine. Positive findings noted below. Modified ESAS Completed by: provider     Drowsiness: 2     Pain: 0           Dyspnea: 2           Stool Occurrence(s): 1        PHYSICAL EXAM:     From RN flowsheet:  Wt Readings from Last 3 Encounters:   06/04/18 95 lb (43.1 kg)   04/20/18 90 lb 6.4 oz (41 kg)   10/27/17 89 lb 3.2 oz (40.5 kg)     Blood pressure 160/65, pulse 94, temperature 97.7 °F (36.5 °C), resp. rate 24, height 4' 10\" (1.473 m), weight 95 lb (43.1 kg), SpO2 93 %.     Pain Scale 1: Numeric (0 - 10)  Pain Intensity 1: 0     Pain Location 1: Arm, Elbow, Leg, Knee  Pain Orientation 1: Left, Right  Pain Description 1: Aching, Sore  Pain Intervention(s) 1: Medication (see MAR)  Last bowel movement, if known:     Constitutional: Frail, resting in bed  Eyes: pupils equal, anicteric  ENMT: no nasal discharge, moist mucous membranes  Cardiovascular: regular rhythm, distal pulses intact, no VAIBHAV  Respiratory: breathing mildly labored, symmetric, diminished BS throughout  Gastrointestinal: soft non-tender, +bowel sounds  Musculoskeletal: no deformity, no tenderness to palpation  Skin: warm, dry  Neurologic: following commands, moving all extremities  Psychiatric: flat affect, no hallucinations  Other:       HISTORY:     Active Problems:    Rheumatoid arthritis (HCC) (2/27/2012)      Mixed hyperlipidemia (3/14/2012)      Generalized anxiety disorder (3/14/2012)      CAD (coronary artery disease) (3/26/2012)      Interstitial lung disease ([facility] (4/20/2015)      Hypertension ()      GERD (gastroesophageal reflux disease) ()      Dementia in Alzheimer's disease ()      Hypoxia (6/3/2018)      MIKHAIL (acute kidney injury) ([facility] (6/3/2018)      Anemia (6/3/2018)      Melena (6/3/2018)      Pulmonary fibrosis ([facility] (6/3/2018)      Past Medical History:   Diagnosis Date    CAD (coronary artery disease)     CAD    Dementia in Alzheimer's disease     Depression     DM type 2 causing vascular disease ([facility]     GERD (gastroesophageal reflux disease)     Hx of tuberculosis     as child    Hypercholesterolemia     Hypertension     Osteoporosis, post-menopausal     Rheumatoid arthritis(714.0)       Past Surgical History:   Procedure Laterality Date    HX CATARACT REMOVAL      left with lens implant    HX CORONARY ARTERY BYPASS GRAFT      HX CORONARY STENT PLACEMENT      HX KNEE REPLACEMENT Left     HX MOHS PROCEDURES      right      Family History   Problem Relation Age of Onset    Colon Cancer Mother     Cancer Father     Heart Disease Child             History reviewed, no pertinent family history.   Social History   Substance Use Topics    Smoking status: Never Smoker    Smokeless tobacco: Never Used    Alcohol use No     Allergies   Allergen Reactions    Codeine Nausea and Vomiting      Current Facility-Administered Medications   Medication Dose Route Frequency    albuterol-ipratropium (DUO-NEB) 2.5 MG-0.5 MG/3 ML  3 mL Nebulization Q2H PRN    ALPRAZolam (XANAX) tablet 0.25 mg  0.25 mg Oral QID PRN    amLODIPine (NORVASC) tablet 5 mg  5 mg Oral DAILY AFTER BREAKFAST    therapeutic multivitamin (THERAGRAN) tablet 1 Tab  1 Tab Oral DAILY    acetaminophen (TYLENOL) tablet 1,000 mg  1,000 mg Oral QHS    albuterol-ipratropium (DUO-NEB) 2.5 MG-0.5 MG/3 ML  3 mL Nebulization Q4HWA RT    0.9% sodium chloride infusion 250 mL  250 mL IntraVENous PRN    0.9% sodium chloride infusion 250 mL  250 mL IntraVENous PRN    acetaminophen (TYLENOL) tablet 1,000 mg  1,000 mg Oral DAILY PRN    ferrous sulfate tablet 325 mg  1 Tab Oral DAILY WITH BREAKFAST    sodium chloride (NS) flush 5-10 mL  5-10 mL IntraVENous Q8H    sodium chloride (NS) flush 5-10 mL  5-10 mL IntraVENous PRN    hydrALAZINE (APRESOLINE) 20 mg/mL injection 10 mg  10 mg IntraVENous Q2H PRN    0.9% sodium chloride infusion 250 mL  250 mL IntraVENous PRN    pantoprazole (PROTONIX) tablet 40 mg  40 mg Oral ACB          LAB AND IMAGING FINDINGS:     Lab Results   Component Value Date/Time    WBC 11.3 (H) 06/07/2018 02:19 AM    HGB 10.4 (L) 06/07/2018 02:19 AM    PLATELET 177 (H) 47/59/4847 02:19 AM     Lab Results   Component Value Date/Time    Sodium 135 (L) 06/07/2018 02:19 AM    Potassium 4.0 06/07/2018 02:19 AM    Chloride 106 06/07/2018 02:19 AM    CO2 23 06/07/2018 02:19 AM    BUN 26 (H) 06/07/2018 02:19 AM    Creatinine 1.05 (H) 06/07/2018 02:19 AM    Calcium 8.1 (L) 06/07/2018 02:19 AM    Magnesium 1.9 06/06/2018 02:36 AM    Phosphorus 4.2 06/06/2018 02:36 AM      Lab Results   Component Value Date/Time    AST (SGOT) 22 06/04/2018 04:20 PM    Alk. phosphatase 104 06/04/2018 04:20 PM    Protein, total 8.6 (H) 06/04/2018 04:20 PM    Protein, total 9.2 (H) 06/04/2018 04:20 PM    Albumin 1.4 (L) 06/04/2018 04:20 PM    Globulin 7.8 (H) 06/04/2018 04:20 PM     Lab Results   Component Value Date/Time    INR 1.1 02/26/2012 11:55 PM    Prothrombin time 11.3 (H) 02/26/2012 11:55 PM    aPTT 30.1 02/26/2012 11:55 PM      Lab Results   Component Value Date/Time    Iron 24 (L) 06/03/2018 11:56 PM    TIBC 120 (L) 06/03/2018 11:56 PM    Iron % saturation 20 06/03/2018 11:56 PM    Ferritin 324 (H) 06/04/2018 12:43 AM      No results found for: PH, PCO2, PO2  No components found for: [place]   Lab Results   Component Value Date/Time    CK 58 09/04/2015 12:15 PM    CK - MB <0.5 (L) 09/04/2015 12:15 PM                Total time:  25 min  Counseling / coordination time, spent as noted above: 20 min  > 50% counseling / coordination?: yes    Prolonged service was provided for  []30 min   []75 min in face to face time in the presence of the patient, spent as noted above.   Time Start: Time End:   Note: this can only be billed with 16170 (initial) or 05336 (follow up). If multiple start / stop times, list each separately.

## 2018-06-08 NOTE — PROGRESS NOTES
Problem: Falls - Risk of  Goal: *Absence of Falls  Document Macarena Fall Risk and appropriate interventions in the flowsheet.    Outcome: Progressing Towards Goal  Fall Risk Interventions:  Mobility Interventions: Bed/chair exit alarm, Patient to call before getting OOB    Mentation Interventions: Bed/chair exit alarm, Door open when patient unattended, Reorient patient    Medication Interventions: Bed/chair exit alarm, Evaluate medications/consider consulting pharmacy, Patient to call before getting OOB, Teach patient to arise slowly    Elimination Interventions: Bed/chair exit alarm, Call light in reach, Patient to call for help with toileting needs

## 2018-06-08 NOTE — PROGRESS NOTES
PCS given to [facility] charge nurse for transport team.I will call charge nurse when ambulance is confirmed and will notify pt.'s daughter-in-law and Yasmeen Win.   Naima Lobo

## 2018-06-08 NOTE — PALLIATIVE CARE DISCHARGE
The Palliative Medicine team was consulted as part of your / your loved one's care in the hospital. Our team is a supportive service; we strive to relieve suffering and improve quality of life. You identified the following goal(s) as your main focus for healthcare: Patient/Health Care Proxy Stated Goals: Rehabilitation    Plan to go to [place] in an effort to build strength to possible return home; Family considering utilizing hospice services for extra layer of support after rehabilitation. We reviewed advance care planning information, which includes the following:  Advance Care Planning 6/3/2018   Patient's Healthcare Decision Maker is: Legal Next of Kin   Primary Decision Maker Name Corby Lainez   Primary Decision Maker Phone Number [phone]   Primary Decision Maker Relationship to Patient Adult child   Confirm Advance Directive Yes, not on file   Patient Would Like to Complete Advance Directive -       We reviewed / discussed your code status as: DNR     Full Code means perform CPR in the event of cardiac arrest     [facility] means do NOT perform CPR in the event of cardiac arrest     Partial Code means you have specific preferences, please discuss with your health care team     No Order means this issue was not addressed / resolved during your stay    Because of the importance of this information, we are providing you with a printed copy to share with other healthcare providers after this hospitalization is complete. If any of the above information is incomplete or incorrect, please contact the Palliative Medicine team at [phone].

## 2018-06-08 NOTE — DISCHARGE INSTRUCTIONS
Nutrition Recommendations for Discharge:             Continue Oral Nutrition Supplements at discharge:   Ensure Active High Protein for Muscle Health as needed  for 30 days unless otherwise directed by your Primary Care Physician. This product can be purchased at your   local grocery store or drug store and online. [address]         HOSPITALIST DISCHARGE INSTRUCTIONS  NAME: Carly Zamora   :  1932   MRN:  [id number]     Date/Time:  2018 12:44 PM    ADMIT DATE: 6/3/2018     DISCHARGE DATE: 2018     ADMITTING DIAGNOSIS:  Interstitial lung disease  Anemia    DISCHARGE DIAGNOSIS:  As above    MEDICATIONS:    · It is important that you take the medication exactly as they are prescribed. · Keep your medication in the bottles provided by the pharmacist and keep a list of the medication names, dosages, and times to be taken in your wallet. · Do not take other medications without consulting your doctor. Pain Management: per above medications    What to do at Home:  After discussions with palliative care family has determined not to pursue aggressive work up or treatment. Once clinically appropriate would advise referral to hospice    Recommended diet:  Resume previous diet    Recommended activity: Activity as tolerated    If you experience any of the following symptoms then please call your primary care physician or return to the emergency room if you cannot get hold of your doctor:  Fever, chills, nausea, vomiting, diarrhea, change in mentation, falling, bleeding, shortness of breath    Follow Up:  Dr. Courtney Cook MD  you are to call and set up an appointment to see them in 1 week. Information obtained by :  I understand that if any problems occur once I am at home I am to contact my physician. I understand and acknowledge receipt of the instructions indicated above. Physician's or R.N.'s Signature                                                                  Date/Time                                                                                                                                              Patient or Representative Signature                                                          Date/Time

## 2018-06-08 NOTE — PROGRESS NOTES
Discharge:  Pt has been accepted to go to [facility] today. I informed pt.'s daughter-in-law that the plan is for pt to go to rehab today. I will call daughter-in-law,Ajith back today once ambulance is set up. Dr Lobo Burrell updated pt.'s son. I am requesting a 1330 pm transport to [facility]. Once confirmed,I will document transport time. I will place the PCS for transport in pt.'s bedside chart. Pt is on 3 liters O2. Nursing,please insure attending has provided hard script for xanax  The number to devi report is [phone] and ask for [address]

## 2018-06-08 NOTE — DISCHARGE SUMMARY
Physician Discharge Summary     Patient ID:  Serina Steele  [id number]  6 y.o.  4/5/1932    Admit date: 6/3/2018    Discharge date and time: 6/8/2018    Admission Diagnoses: Hypoxia  Pulmonary fibrosis [facility])    Discharge Diagnoses: Active Problems:    Rheumatoid arthritis ([facility] [address] (2/27/2012)      Mixed hyperlipidemia (3/14/2012)      Generalized anxiety disorder (3/14/2012)      CAD (coronary artery disease) (3/26/2012)      Interstitial lung disease ([facility] [address] (4/20/2015)      Hypertension ()      GERD (gastroesophageal reflux disease) ()      Dementia in Alzheimer's disease ()      Hypoxia (6/3/2018)      MIKHAIL (acute kidney injury) ([facility] [address] (6/3/2018)      Anemia (6/3/2018)      Melena (6/3/2018)      Pulmonary fibrosis ([facility] [address] (6/3/2018)           Hospital Course:   Discussed case with oncology, cardiology and GI. Further work up of anemia would require multiple procedures including EGD, colonoscopy and BMB. She is high risk for complications due to bradycardia and ILD. I have discussed with son, he wishes to pursue a palliative approach with no further aggressive measures planned. Family planning on eventual transition to hospice when appropriate     Symptomatic normocytic anemia of unclear etiology / melena: Stool for occult blood in ER is negative. GI following, they do not feel pt is a good candidate for endoscopy at this time. Hematology is following, pt would need BMB for further work up     ?2nd degree AV block, type 2 / Mod.-Severe MR / Mod.- Severe TR / pulmonary hypertension / Pericardial effusion: Appreciate cardiology consult. Avoid radha blockade. Episode of bradycardia, continue to monitor on telemetry. No further work up planned      Interstitial lung disease / Hypoxia / chronic respiratory failure. Appreciate pulmonary input. Continue O2 supplement to keep SAO2 > 90%, duo neb. Pt reporting increased sob today she did receive IVF this hospital stay, will give dose of lasix and monitor     Hypercalcemia. Corrected to 12.7 on admission, now normal 9.5. SPEP and light chains pending.      Hypertension. Not well controlled. Continue amlodipine and hydralazine PRN. May need to increase amlodipine or add another BP med       Rheumatoid arthritis: Continue pain meds       Generalized anxiety disorder: Not on meds       CAD (coronary artery disease): On ASA but holding this while Hgb is low      GERD (gastroesophageal reflux disease). Start PPI      Dementia in Alzheimer's disease. Remains a poor historian. Supportive care       MIKHAIL (acute kidney injury). Avoid nephrotic drugs. Continue IVF and monitor     Severe protein-caloric malnutrition. Nutrition consult        PCP: Beverley Garza MD     Consults: Cardiology, GI, Hematology/Oncology and Palliative Care    Condition of patient at discharge: stable    Discharge Exam:  Please refer to progress note from today. Disposition: SNF    Patient Instructions:   Current Discharge Medication List      START taking these medications    Details   albuterol-ipratropium (DUO-NEB) 2.5 mg-0.5 mg/3 ml nebu 3 mL by Nebulization route every six (6) hours as needed. Qty: 30 Nebule, Refills: 0      ALPRAZolam (XANAX) 0.25 mg tablet Take 1 Tab by mouth four (4) times daily as needed for Anxiety. Max Daily Amount: 1 mg. Qty: 10 Tab, Refills: 0    Associated Diagnoses: Interstitial lung disease ([address]      pantoprazole (PROTONIX) 40 mg tablet Take 1 Tab by mouth Daily (before breakfast). Qty: 30 Tab, Refills: 0         CONTINUE these medications which have NOT CHANGED    Details   !! acetaminophen (TYLENOL EXTRA STRENGTH) 500 mg tablet Take 1,000 mg by mouth nightly. amLODIPine (NORVASC) 10 mg tablet Take 5 mg by mouth daily (after breakfast). albuterol (PROVENTIL HFA, VENTOLIN HFA, PROAIR HFA) 90 mcg/actuation inhaler Take 1 Puff by inhalation every four (4) hours as needed for Wheezing.   Qty: 1 Inhaler, Refills: 3    Associated Diagnoses: Mild intermittent asthma without complication      ketoconazole (NIZORAL) 2 % topical cream Apply  to affected area two (2) times daily as needed for Skin Irritation. Qty: 15 g, Refills: 0    Associated Diagnoses: Skin rash; Seborrheic dermatitis      ferrous sulfate 325 mg (65 mg iron) tablet TAKE 1 TABLET BY MOUTH DAILY BEFORE BREAKFAST  Qty: 30 Tab, Refills: 0    Associated Diagnoses: Anemia, chronic disease      multivitamin (ONE A DAY) tablet Take 1 Tab by mouth daily. aspirin delayed-release 81 mg tablet Take 1 tablet by mouth daily. Qty: 30 tablet, Refills: 11    Associated Diagnoses: CAD (coronary artery disease); Type II or unspecified type diabetes mellitus without mention of complication, uncontrolled      !! acetaminophen (TYLENOL) 500 mg tablet Take 500 mg by mouth daily. !! - Potential duplicate medications found. Please discuss with provider. Activity: Activity as tolerated  Diet: Resume previous diet  Wound Care: None needed    Follow-up with Karely Pickens MD in 1 week.   Follow-up tests/labs none    Approximate time spent in patient care on day of discharge: 33 minutes    Signed:  Jam Posey MD  6/8/2018  12:46 PM

## 2018-06-08 NOTE — ROUTINE PROCESS
Bedside shift change report given to [facility] (oncoming nurse) by Maria Guadalupe Dumont (offgoing nurse). Report included the following information SBAR, Kardex, Intake/Output, MAR, Accordion and Recent Results.

## 2018-06-08 NOTE — PROGRESS NOTES
Problem: Mobility Impaired (Adult and Pediatric)  Goal: *Acute Goals and Plan of Care (Insert Text)  Physical Therapy Goals  Initiated 6/7/2018  1. Patient will move from supine to sit and sit to supine , scoot up and down and roll side to side in bed with independence within 7 day(s). 2.  Patient will transfer from bed to chair and chair to bed with supervision/set-up using the least restrictive device within 7 day(s). 3.  Patient will perform sit to stand with supervision/set-up within 7 day(s). 4.  Patient will ambulate with minimal assistance/contact guard assist for 200 feet with the least restrictive device within 7 day(s). 5.  Patient will ascend/descend 4 stairs with  handrail(s) with minimal assistance/contact guard assist within 7 day(s). physical Therapy TREATMENT  Patient: Fay Bautista (13 y.o. female)  Date: 6/8/2018  Diagnosis: Hypoxia  Pulmonary fibrosis (HCC) <principal problem not specified>       Precautions:    Chart, physical therapy assessment, plan of care and goals were reviewed. ASSESSMENT:  Patient in bed states she is tired, does not want to get up to the chair but asking to use bedside commode. Son present initially, he states she did not use oxygen at home. Patient with frequent cough throughout treatment. Performed transfer training for out of bed and pivot to the bedside commode. Cues needed for hand placement and technique. Patient passive with movement/transfer. \" Can you get my [left] arm out? \" when lying on her side. Limited by weakness, desatting, and general fatigue. Recommend SNF at discharge.     Documentation for home O2:     ROOM AIR    AT REST   O2 SATS  84 HR  97   ROOM AIR WITH ACTIVITY 02 SATS  N/A HR  N/A   (2   ) LITERS OF O2 WITH ACTIVITY O2 SATS  88 HR  98   (2    )LITERS OF 02 PATIENT LEFT COMFORTABLY  SITTING/SUPINE 02 SATS  96 HR  97       Progression toward goals:  [x]    Improving appropriately and progressing toward goals  []    Improving slowly and progressing toward goals  []    Not making progress toward goals and plan of care will be adjusted     PLAN:  Patient continues to benefit from skilled intervention to address the above impairments. Continue treatment per established plan of care. Discharge Recommendations:  Skilled Nursing Facility  Further Equipment Recommendations for Discharge:  tbd at SNF     SUBJECTIVE:   Patient stated I need to go to the bathroom.     OBJECTIVE DATA SUMMARY:   Critical Behavior:  Neurologic State: Alert, Confused  Orientation Level: Oriented to person, Oriented to place  Cognition: Follows commands  Safety/Judgement: Awareness of environment  Functional Mobility Training:  Bed Mobility:     Supine to Sit: Moderate assistance; Additional time;Assist x1  Sit to Supine: Moderate assistance;Assist x2; Additional time           Transfers:  Sit to Stand: Moderate assistance;Assist x1;Additional time  Stand to Sit: Minimum assistance;Assist x1;Additional time        Bed to Chair: Moderate assistance;Assist x1;Additional time                    Balance:     Ambulation/Gait Training:                                                        Stairs:              Pain:  Pain Scale 1: Numeric (0 - 10)  Pain Intensity 1: 0              Activity Tolerance:   Limited by weakness and desat on room air  Please refer to the flowsheet for vital signs taken during this treatment.   After treatment:   []    Patient left in no apparent distress sitting up in chair  [x]    Patient left in no apparent distress in bed  [x]    Call bell left within reach  [x]    Nursing notified  []    Caregiver present  [x]    Bed alarm activated    COMMUNICATION/COLLABORATION:   The patients plan of care was discussed with: Registered Nurse    Vinh Atkinson, PT   Time Calculation: 26 mins

## 2018-06-08 NOTE — PROGRESS NOTES
Problem: Pressure Injury - Risk of  Goal: *Prevention of pressure injury  Document Taz Scale and appropriate interventions in the flowsheet. Outcome: Progressing Towards Goal  Pressure Injury Interventions:  Sensory Interventions: Assess changes in LOC, Float heels, Minimize linen layers    Moisture Interventions: Absorbent underpads, Internal/External urinary devices, Maintain skin hydration (lotion/cream)    Activity Interventions: Increase time out of bed, Pressure redistribution bed/mattress(bed type), PT/OT evaluation    Mobility Interventions: Float heels, HOB 30 degrees or less, Pressure redistribution bed/mattress (bed type)    Nutrition Interventions: Document food/fluid/supplement intake    Friction and Shear Interventions: Foam dressings/transparent film/skin sealants, HOB 30 degrees or less, Lift sheet               Problem: Activity Intolerance  Goal: *Oxygen saturation during activity within specified parameters  Outcome: Not Progressing Towards Goal  Desats & SOB with activity    Problem: Falls - Risk of  Goal: *Absence of Falls  Document Macarena Fall Risk and appropriate interventions in the flowsheet.    Outcome: Progressing Towards Goal  Fall Risk Interventions:  Mobility Interventions: Bed/chair exit alarm, Patient to call before getting OOB    Mentation Interventions: Bed/chair exit alarm, Door open when patient unattended, Reorient patient    Medication Interventions: Bed/chair exit alarm, Evaluate medications/consider consulting pharmacy, Patient to call before getting OOB, Teach patient to arise slowly    Elimination Interventions: Bed/chair exit alarm, Call light in reach, Patient to call for help with toileting needs

## 2018-06-08 NOTE — PROGRESS NOTES
NUTRITION    RECOMMENDATIONS:     1. Encourage po intake, assist with meals and Ensure  2. Standing Weight to assess for any weight changes  3. Assist with meal ordering      ASSESSMENT:   6/8: Pt seen for f/u, spoke with daughter. Pt to be discharged at 5 pm today. Pt ate half of an egg salad sandwich at lunch, some applesauce. States she likes the Ensure, which was changed to Moleculin. Advised daughter after pt's discharge to continue to encourage po intake and continue a supplement for additional nutrition with varied poor intake and low weight. Daughter is unsure of pt's UBW/weight and diet hx. Son not available at this time. Also encouraged pt to try to increase po intake. 6/4: Consult received for general nutrition management and supplements, MST referral due to weight loss and eating poorly, as well as low BMI.,   Admitted with fatigue, SOB, cough. GI following for GI Bleed. Visited pt who is a poor historian, attempted to call son, unable to contact. Pt's appearance with loss of fat/muscle tissue, unsure of when - wt history as below. Current weight of 84 lbs is estimated, attempted bed weight- 95 lbs, requested standing weight for accuracy. Pt states she lives with her son and daughter in law and they cook dinner, someone comes in during the day to help at other meals. She likes Ensure, encouraged her to eat well at meals and to drink Ensure for additional nutrients. Pt verbalized understanding. Will follow, attempt to obtain more history from family,.     Weight Metrics 6/4/2018 4/20/2018 10/27/2017 2/22/2017 2/17/2017 2/15/2017 12/15/2016   Weight 95 lb 90 lb 6.4 oz 89 lb 3.2 oz 95 lb 93 lb 93 lb 6.4 oz 98 lb   BMI 19.86 kg/m2 18.89 kg/m2 18.64 kg/m2 19.86 kg/m2 19.44 kg/m2 18.24 kg/m2 -         Past Medical History:   Diagnosis Date    CAD (coronary artery disease)     CAD    Dementia in Alzheimer's disease     Depression     DM type 2 causing vascular disease (HCC)     GERD (gastroesophageal reflux disease)     Hx of tuberculosis     as child    Hypercholesterolemia     Hypertension     Osteoporosis, post-menopausal     Rheumatoid arthritis(714.0)        Diet: Regular, Ensure Compact Q meal  Patient Vitals for the past 100 hrs:   % Diet Eaten   06/06/18 1727 5 %   06/06/18 1255 0 %   06/06/18 0925 5 %         Abd:  wdl          BM: 6/6    Skin Integrity: [x]Intact  []Other  Edema: [x]None []Other    Nutritionally Significant Medications: [x] Reviewed    Labs:    Lab Results   Component Value Date/Time    Sodium 135 (L) 06/07/2018 02:19 AM    Potassium 4.0 06/07/2018 02:19 AM    Chloride 106 06/07/2018 02:19 AM    CO2 23 06/07/2018 02:19 AM    Anion gap 6 06/07/2018 02:19 AM    Glucose 88 06/07/2018 02:19 AM    BUN 26 (H) 06/07/2018 02:19 AM    Creatinine 1.05 (H) 06/07/2018 02:19 AM    Calcium 8.1 (L) 06/07/2018 02:19 AM    Magnesium 1.9 06/06/2018 02:36 AM    Phosphorus 4.2 06/06/2018 02:36 AM    Albumin 1.4 (L) 06/04/2018 04:20 PM       Anthropometrics:   Weight Source: Bed (unsure if bed zeroed)  Height: 4' 10\" (147.3 cm),    Body mass index is 19.86 kg/(m^2). IBW : 43.5 kg (96 lb), % IBW (Calculated): 98.96 %, Usual Body Weight:  (UTO),    Wt Readings from Last 5 Encounters:   06/04/18 43.1 kg (95 lb)   04/20/18 41 kg (90 lb 6.4 oz)   10/27/17 40.5 kg (89 lb 3.2 oz)   02/22/17 43.1 kg (95 lb)   02/17/17 42.2 kg (93 lb)       Estimated Daily Nutrition Requirements:   Weight Used: Other (comment) (bed weight, ? accuracy)  Kcals: 1240 Kcals/day Based on:[address] Jeor (x 1.3 + 250)  Protein: 47 g ( to 57 gms, 1.1 -1.2 gms/kg)   Fluid: 1240 ml   Carbohydrate:   At least 130 gms/day      Education & Discharge Needs:   [] Pt discussed in ID rounds     Nutrition related discharge needs addressed:     [x] Supplements (on d/c instruction &/or coupons provided)    [] Tube Feedings     [] Education    []No nutrition related discharge needs at this time     Cultural, Anabaptism and ethnic food preferences identified    [x] None   [] Yes     NUTRITION DIAGNOSIS:     Inadequate oral intake related to poor appetite  as evidenced by documented, pt with poor appetite, Ensur ordered                     Pt is at Nutrition Risk:  [x]    No Nutrition Risk Identified:  []    RD INTERVENTION / PT GOALS:     Food/Nutrient Delivery:   , Supplements: Commercial supplement (Ensure High Protein ordered Q meal),  ,  ,   changed to Ensure Compact Q meal  Nutrition Education: ,    Nutrition Counseling:    Coordination of Care:      Goal: Pt janeen continue to increase po intake with meals and ONS prior to discharge    MONITORING & EVALUATION:   Food/Nutrient Intake Outcomes:  Total energy intake, Liquid meal replacement          Previous Nutrition Goals:  Previous Goal Met: No  Previous Recommendations:      Previous Recommendations Implemented: No       Jaguar Ellis RD

## 2018-06-08 NOTE — PROGRESS NOTES
Bedside and Verbal shift change report given to Angle Bruce (oncoming nurse) by Emily Ling (offgoing nurse). Report included the following information SBAR, Kardex, ED Summary, Procedure Summary, Intake/Output, MAR, Recent Results and Cardiac Rhythm nsr, sinus tach. Pt incont of urine overnight. Purewick changed 0400. Xanax given overnight, HR has improved. 1000 PT at bedside, pt failed 6 min walk. 1334 report called to [facility], ambulance will arrive 1700. Hard script for xanax to be sent with ambulance. Pt will remain on tele until transport, IV removed. Ambulance will arrive 1800, family and patient notified. Tia at [facility] given update to NOT give info out to son Syedarodo Crump. [employer] to be only contacts, phone numbers provided. [phone] pt transported via ambulance to [facility], son Kenneth Craven at [facility] with belongings. Hard script sent with transport. Copy of dc instr sent with son.

## 2018-06-08 NOTE — PROGRESS NOTES
[facility]  cannot transport pt until 5 pm.I I [facility] Bl[facility]. I notified pt.'s daughter-in-law. I am notifying charge nurse and will ask her to change pt.'s emergency contacts per daughter-in-law's request.  Ana Daniel

## 2018-06-11 NOTE — PROGRESS NOTES
Hospital Discharge Follow-Up      Date/Time:  6/11/2018 9:01 AM    Patient was admitted to [facility] on 6/3/18 and discharged on 6/8/18 for hypoxia. Patient presented to -ED by family for c/o worsening shortness of breath and fatigue. Patient has h/o dementia and pulmonary fibrosis. Hgb on admission, 6. 8. The physician discharge summary was available at the time of outreach. Patient was contacted within three business days of discharge. Patient was discharge to [facility] SNF. Outbound call made to Jie Zamora, (HIPAA verified) NN left voicemail message with contact information requesting return call. NN contacted [facility]. Requested the [employer].  NO answer  NN will send message to [facility] for updates

## 2018-06-14 NOTE — PROGRESS NOTES
Community Care Team Documentation for Patient in [facility]  Initial Follow Up       Patient was admitted to [address] [place] [address] from 6/3 to 6/8. Patient was discharged to [facility] on 6/8 (date). See previous [facility] Care Team documentation under Patient Outreach. Hospital Discharge diagnosis:  Hypoxia Pulmonary fibrosis     RRAT score:  22    Advance Medical Directive on file in EMR? DDNR on file    Total Hospitalizations/ED visits last 6 months? IP - 1; ED - 0    PCP : Moriah Jones MD  Nurse Navigator in PCP office: Yessica Zarate  Note routed to Nurse Navigator team.    Per Sherral Castleman and IDT at SNF, Ensured patient arrived at SNF safely and with admission packet in order. Discussed need for pulmonary follow up in 2-4 weeks. Pt is being skilled by PT/OT. Currently ambulating 30-40ft with a RW and contact guard assist. Pt requiring frequent rest breaks. Min assist with transfers. Max assist with ADLs and toileting. Therapy increasing daily therapy time and extending therapy to the weekends. PTA pt was independent with a RW. Plan to DC home with son. [facility] agency: TBD. Medications were not reconciled and general patient assessment was not completed during this skilled nursing facility outreach.      EDGAR Betancur

## 2018-06-21 NOTE — PROGRESS NOTES
Community Care Team Documentation for Patient in [facility]  Subsequent Follow up     Patient remains at [facility] and Rehabilitation ([facility]). See previous [facility] Team notes. PCP : Al Ball MD  Nurse Navigator in PCP office: Sera Zavala  PCP follow up appointment:  6/25 at 2:30 PM with Dr[address] Bean [address] at [address]. Per Jael Guan and team at [facility], PT/OT continue. Currently ambulating short distances, transfering with min to mod assist.  Family is asking to take patient home. Plan for discharge 6/23 to home with son and home health. Son also checking into personal care aids. [facility] and personal care TBD. PCP follow up 6/25 at 2:30 PM with Dr. Bean Salazar at [address]. Medications were not reconciled and general patient assessment was not completed during this skilled nursing facility outreach.      Trinda Sever, MSN, RN, ACNS-BC, [facility]  Nurse Navigator, [employer] [phone]

## 2018-06-28 NOTE — Clinical Note
Transitioned from [place] to [facility] per family request 6/23. Pt is being skilled by PT/OT. Barrier: low endurance, O2 dependent. Pt would need to ascend a flight of stairs in order to return home with son. Family looking into LTC placement pending progress with therapy. LOS/Disposition TBD.

## 2018-06-28 NOTE — PROGRESS NOTES
Community Care Team Documentation for Patient in [facility]  Subsequent Follow up     Patient transferred from [facility] to [facility] 6/23. See previous [facility] Team notes. PCP : Rhys Samuels MD  Nurse Navigator in PCP office: Gilles Marcano of [facility] team, Pt transitioned from [facility] to [facility] per family request 6/23. Pt is being skilled by PT/OT. Barrier: low endurance, O2 dependent. Pt would need to ascend a flight of stairs in order to return home with son. Family looking into LTC placement pending progress with therapy. LOS/Disposition TBD. Medications were not reconciled and general patient assessment was not completed during this skilled nursing facility outreach.      DIAMOND GayW

## 2018-07-05 NOTE — PROGRESS NOTES
Community Care Team Documentation for Patient in [facility]  Subsequent Follow up     Patient remains at [address] of [facility] ([facility]). See previous [facility] Team notes. PCP : Mindi Sever, MD  Nurse Navigator in PCP office: Reinier Levin with Andra Mauro and team at SNF. PT/OT continue. SLP for dysphagia management. Patient making some progress with ambulating, min assist for ambulating 10 ft with RW. Unable to do stairs at this time. Min assist with bed mobility, min to mod assist for transfers and for sit to stand. Max assist for toileting, SBA for self feeding. dependent on O2. Family interested in [facility] application. Possibility of LTC vs home with niece and private caregivers. LOS: TBD. Medications were not reconciled and general patient assessment was not completed during this skilled nursing facility outreach.

## 2018-07-12 NOTE — PROGRESS NOTES
Community Care Team Documentation for Patient in [facility]  Subsequent Follow up     Patient remains at [facility] ([facility]). See previous [facility] Team notes. PCP : Christopher Castillo MD  Nurse Navigator in PCP office: Dariana De Jesus with Landon Menjivar and team at SNF. PT/OT/SLP continue. On mechanical soft diet. Family meeting to be held in 2 weeks to discuss disposition and therapy progress. Family requesting placement. LTC vs adult home being discussed. Medications were not reconciled and general patient assessment was not completed during this skilled nursing facility outreach.

## 2018-07-19 NOTE — PROGRESS NOTES
Community Care Team Documentation for Patient in [facility]  Subsequent Follow up     Patient remains at [address] [facility] ([facility]). See previous [facility] Team notes. PCP : Maura Halsted, MD  Nurse Navigator in PCP office: Nick Herrmann with Roman Calhoun and team at SNF. PT last treat day 7/20, OT last treat day 8/2. Patient making minimal gains. Currently ambulating 10 ft in parallel bars with min assist, max assist with ADLs and transfers. Per therapy patient has ability to do more but chooses to lie in bed. Patient not motivated to participate, lethargic at times. Xanax ordered 2 times per day. SNF moved dosing to later in the day to see if this is contributing. Plan for transition to LTC at [address] [facility] on 8/3. Medications were not reconciled and general patient assessment was not completed during this skilled nursing facility outreach.      Elisabet Portillo, MSN, RN, ACNS-BC, [facility]  Nurse Navigator, [facility] [phone]

## 2018-07-26 NOTE — PROGRESS NOTES
Community Care Team Documentation for Patient in [facility]  Subsequent Follow up     Patient remains at [facility] ([facility]). See previous [facility] Team notes. PCP : Mehran Werner MD  Nurse Navigator in PCP office: Navdeep Ma with Al Goode and team at SNF. PT last treat day 7/20. OT last therapy treat day 8/2. Plan to transition to LTC at [facility] 8/3. SNF received [facility]. Medications were not reconciled and general patient assessment was not completed during this skilled nursing facility outreach.

## 2018-08-02 NOTE — PROGRESS NOTES
Community Care Team Documentation for Patient in [facility]  Subsequent Follow up     [facility] [address] [facility] See previous [facility] Team notes. PCP : Veronica Biggs MD  Nurse Navigator in PCP office: Halley Khan with Aravind Peterson and team at SNF. PT last treat day 7/20. OT last therapy treat day today 8/2. Plan to transition to LTC at [address] [facility] 8/3. CCT to sign off at this time. Medications were not reconciled and general patient assessment was not completed during this skilled nursing facility outreach.

## 2019-08-21 NOTE — TELEPHONE ENCOUNTER
[facility] @ [address]  [phone]    [facility] states that they have been giving hydralazine to Ms. Fuchs at 1 pm each day. She states that Ms. Fuchs's son told her that the medication has been discontinued. She would like a call to verify this change and an order to stop the medication. no

## 2025-02-10 NOTE — PROGRESS NOTES
Rodolfo Dill [facility] [address]  [address] [facility], [address] [place] [address]  [phone]      Medical Progress Note      NAME: Carly Zamora   :  1932  MRM:  [id number]    Date/Time: 2018         Assessment and Plan:     Discussed case with oncology, cardiology and GI. Further work up of anemia would require multiple procedures including EGD, colonoscopy and BMB. She is high risk for complications due to bradycardia and ILD. I have discussed with son, he wishes to pursue a palliative approach with no further aggressive measures planned. Family planning on eventual transition to hospice when appropriate    Symptomatic normocytic anemia of unclear etiology / melena: Stool for occult blood in ER is negative. GI following, they do not feel pt is a good candidate for endoscopy at this time. Hematology is following, pt would need BMB for further work up    ? 2nd degree AV block, type 2 / Mod.-Severe MR / Mod.- Severe TR / pulmonary hypertension / Pericardial effusion: Appreciate cardiology consult. Avoid radha blockade. Episode of bradycardia, continue to monitor on telemetry. No further work up planned     Interstitial lung disease / Hypoxia / chronic respiratory failure. Appreciate pulmonary input. Continue O2 supplement to keep SAO2 > 90%, duo neb. Pt reporting increased sob today she did receive IVF this hospital stay, will give dose of lasix and monitor    Hypercalcemia. Corrected to 12.7 on admission, now normal 9.5. SPEP and light chains pending. Hypertension. Not well controlled. Continue amlodipine and hydralazine PRN. May need to increase amlodipine or add another BP med       Rheumatoid arthritis: Continue pain meds      Generalized anxiety disorder: Not on meds      CAD (coronary artery disease): On ASA but holding this while Hgb is low     GERD (gastroesophageal reflux disease). 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  Supportive care      MIKHAIL (acute kidney injury). Avoid nephrotic drugs. Continue IVF and monitor    Severe protein-caloric malnutrition. Nutrition consult              Subjective:     Chief Complaint:  F/u sob. Pt states she doesn't feel good, but has difficulty pinpointing any specific symptoms. She denies pain. No cp, no abd pain. States she is fatigued. Also continues to have shortness of breath. Objective:     Last 24hrs VS reviewed since prior progress note.  Most recent are:    Visit Vitals    /76 (BP 1 Location: Left arm, BP Patient Position: At rest)    Pulse 89    Temp 97.7 °F (36.5 °C)    Resp 24    Ht 4' 10\" (1.473 m)    Wt 43.1 kg (95 lb)    SpO2 94%    BMI 19.86 kg/m2     SpO2 Readings from Last 6 Encounters:   06/08/18 94%   04/20/18 99%   10/27/17 97%   02/22/17 99%   02/17/17 95%   02/15/17 97%    O2 Flow Rate (L/min): 3 l/min       Intake/Output Summary (Last 24 hours) at 06/08/18 0653  Last data filed at 06/08/18 0400   Gross per 24 hour   Intake              550 ml   Output              300 ml   Net              250 ml        Physical Exam:    Gen:  Thin, chronically ill appearing, in no acute distress  HEENT:  Pink conjunctivae, PERRL, hearing intact to voice, moist mucous membranes  Neck:  Supple, without masses, thyroid non-tender  Resp:  No accessory muscle use, fine crackles bilaterally  Card:  No murmurs, normal S1, S2 without thrills, bruits, trace peripheral edema  Abd:  Soft, non-tender, non-distended, normoactive bowel sounds are present, no palpable organomegaly and no detectable hernias  Musc:  No cyanosis or clubbing  Skin:  No rashes or ulcers, skin turgor is good  Neuro:  No focal deficits, follows commands appropriately  Psych:  Poor insight, oriented to person, place     Telemetry reviewed:   1st degree AV block at present  __________________________________________________________________  Medications Reviewed: (see below)  Medications:     Current Facility-Administered Medications Medication Dose Route Frequency    albuterol-ipratropium (DUO-NEB) 2.5 MG-0.5 MG/3 ML  3 mL Nebulization Q2H PRN    ALPRAZolam (XANAX) tablet 0.25 mg  0.25 mg Oral QID PRN    amLODIPine (NORVASC) tablet 5 mg  5 mg Oral DAILY AFTER BREAKFAST    therapeutic multivitamin (THERAGRAN) tablet 1 Tab  1 Tab Oral DAILY    acetaminophen (TYLENOL) tablet 1,000 mg  1,000 mg Oral QHS    albuterol-ipratropium (DUO-NEB) 2.5 MG-0.5 MG/3 ML  3 mL Nebulization Q4HWA RT    0.9% sodium chloride infusion 250 mL  250 mL IntraVENous PRN    0.9% sodium chloride infusion 250 mL  250 mL IntraVENous PRN    acetaminophen (TYLENOL) tablet 1,000 mg  1,000 mg Oral DAILY PRN    ferrous sulfate tablet 325 mg  1 Tab Oral DAILY WITH BREAKFAST    sodium chloride (NS) flush 5-10 mL  5-10 mL IntraVENous Q8H    sodium chloride (NS) flush 5-10 mL  5-10 mL IntraVENous PRN    hydrALAZINE (APRESOLINE) 20 mg/mL injection 10 mg  10 mg IntraVENous Q2H PRN    0.9% sodium chloride infusion 250 mL  250 mL IntraVENous PRN    pantoprazole (PROTONIX) tablet 40 mg  40 mg Oral ACB        Lab Data Reviewed: (see below)  Lab Review:     Recent Labs      06/07/18   0219  06/06/18   0236  06/05/18   1001   WBC  11.3*  12.6*   --    HGB  10.4*  11.0*  10.2*   HCT  33.1*  35.5  32.1*   PLT  509*  580*   --      Recent Labs      06/07/18   0219  06/06/18   0236   NA  135*  140   K  4.0  3.8   CL  106  108   CO2  23  22   GLU  88  114*   BUN  26*  25*   CREA  1.05*  1.02   CA  8.1*  8.1*   MG   --   1.9   PHOS   --   4.2     Lab Results   Component Value Date/Time    Glucose (POC) 88 12/16/2016 04:21 PM    Glucose (POC) 106 (H) 12/16/2016 02:05 PM    Glucose (POC) 79 12/16/2016 11:52 AM    Glucose (POC) 82 12/16/2016 07:43 AM    Glucose (POC) 89 12/15/2016 08:49 PM     No results for input(s): PH, PCO2, PO2, HCO3, FIO2 in the last 72 hours. No results for input(s): INR in the last 72 hours.     No lab exists for component: INREXT, INREXT  All Micro Results Procedure Component Value Units Date/Time    URINE CULTURE HOLD SAMPLE [[id number]] Collected:  06/04/18 0146    Order Status:  Completed Specimen:  Urine from Serum Updated:  06/04/18 0159     Urine culture hold         URINE ON HOLD IN MICROBIOLOGY DEPT FOR 3 DAYS. IF UNPRESERVED URINE IS SUBMITTED, IT CANNOT BE USED FOR ADDITIONAL TESTING AFTER 24 HRS, RECOLLECTION WILL BE REQUIRED. I have reviewed notes of prior 24hr.     Other pertinent lab: None    Total time spent with patient: 40 [address] [facility] discussed with: Patient, Family, Nursing Staff, Consultant/Specialist and >50% of time spent in counseling and coordination of care    Discussed:  Care Plan    Prophylaxis:  SCD's    Disposition:   PT, OT, RN           ___________________________________________________    Attending Physician: Isabel Santiago MD Yes